# Patient Record
Sex: FEMALE | Race: WHITE | Employment: FULL TIME | URBAN - METROPOLITAN AREA
[De-identification: names, ages, dates, MRNs, and addresses within clinical notes are randomized per-mention and may not be internally consistent; named-entity substitution may affect disease eponyms.]

---

## 2017-10-30 PROBLEM — E87.20 LACTIC ACIDOSIS: Status: ACTIVE | Noted: 2017-10-30

## 2017-10-30 PROBLEM — R56.9 SEIZURE (HCC): Status: ACTIVE | Noted: 2017-10-30

## 2017-10-30 PROBLEM — G93.1 ANOXIC ENCEPHALOPATHY (HCC): Status: ACTIVE | Noted: 2017-10-30

## 2017-10-30 PROBLEM — I46.9 CARDIAC ARREST (HCC): Status: ACTIVE | Noted: 2017-10-30

## 2017-10-30 PROBLEM — N39.0 UTI (URINARY TRACT INFECTION): Status: ACTIVE | Noted: 2017-10-30

## 2017-10-31 PROBLEM — J96.90 RESPIRATORY FAILURE (HCC): Status: ACTIVE | Noted: 2017-10-31

## 2017-10-31 PROBLEM — J69.0 ASPIRATION PNEUMONITIS (HCC): Status: ACTIVE | Noted: 2017-10-31

## 2017-11-02 PROBLEM — R94.31 LONG QT INTERVAL: Status: ACTIVE | Noted: 2017-11-02

## 2017-11-06 PROBLEM — J81.0 ACUTE PULMONARY EDEMA (HCC): Status: ACTIVE | Noted: 2017-11-06

## 2017-11-13 ENCOUNTER — TELEPHONE (OUTPATIENT)
Dept: NEUROLOGY | Age: 52
End: 2017-11-13

## 2017-11-13 NOTE — TELEPHONE ENCOUNTER
Patient is asking for a refill on Gabapentin 800 mg take 1 tablet by mouth three times daily. Patient last seen 9/1/17. [facility] Maintenance   Topic Date Due    Diabetes screen  09/14/2015    Flu vaccine (1) 09/01/2017    Pneumococcal med risk (1 of 1 - PPSV23) 09/01/2018 (Originally 9/14/1994)    Lipid screen  03/09/2020    DTaP/Tdap/Td vaccine (2 - Td) 07/31/2026    HIV screen  Completed             (applicable per patient's age: Cancer Screenings, Depression Screening, Fall Risk Screening, Immunizations)    LDL Cholesterol (mg/dL)   Date Value   03/09/2015 50     AST (U/L)   Date Value   09/01/2017 105 (H)     ALT (U/L)   Date Value   09/01/2017 179 (H)     BUN (mg/dL)   Date Value   09/01/2017 13      (goal A1C is < 7)   (goal LDL is <100) need 30-50% reduction from baseline     BP Readings from Last 3 Encounters:   09/01/17 136/88   05/02/17 115/74   03/30/17 140/80    (goal /80)      All Future Testing planned in [facility]:  Lab Frequency Next Occurrence   MRI Wrist Right W WO Contrast Once 12/26/2017   Hepatitis B E Antibody Once 04/10/2018   Hepatitis B DNA, Ultraquantitative, PCR Once 04/10/2018   Hepatitis B E Antigen Once 04/10/2018       Next Visit Date:  No future appointments.          Patient Active Problem List:     HTN (hypertension)     Arthritis     Gout Pt phoned wanting to make an appointment with Dr Afua Manrique but could not tell us why. She has an appointment with pcp and she will check and see if Dr Imani Hanson needs to see her.   Called pt

## 2017-11-15 ENCOUNTER — OFFICE VISIT (OUTPATIENT)
Dept: FAMILY MEDICINE CLINIC | Age: 52
End: 2017-11-15

## 2017-11-15 VITALS
DIASTOLIC BLOOD PRESSURE: 72 MMHG | HEART RATE: 84 BPM | RESPIRATION RATE: 16 BRPM | HEIGHT: 63 IN | WEIGHT: 110.8 LBS | BODY MASS INDEX: 19.63 KG/M2 | TEMPERATURE: 98.5 F | OXYGEN SATURATION: 98 % | SYSTOLIC BLOOD PRESSURE: 106 MMHG

## 2017-11-15 DIAGNOSIS — Z12.4 CERVICAL CANCER SCREENING: ICD-10-CM

## 2017-11-15 DIAGNOSIS — Z28.21 PNEUMOCOCCAL VACCINATION DECLINED BY PATIENT: ICD-10-CM

## 2017-11-15 DIAGNOSIS — Z12.39 BREAST CANCER SCREENING: ICD-10-CM

## 2017-11-15 DIAGNOSIS — I46.9 CARDIAC ARREST (HCC): ICD-10-CM

## 2017-11-15 DIAGNOSIS — Z13.220 LIPID SCREENING: ICD-10-CM

## 2017-11-15 DIAGNOSIS — F10.10 ETOH ABUSE: ICD-10-CM

## 2017-11-15 DIAGNOSIS — I10 ESSENTIAL HYPERTENSION: Primary | ICD-10-CM

## 2017-11-15 DIAGNOSIS — R56.9 SEIZURE (HCC): ICD-10-CM

## 2017-11-15 DIAGNOSIS — I47.29 POLYMORPHIC VENTRICULAR TACHYCARDIA: ICD-10-CM

## 2017-11-15 DIAGNOSIS — F17.200 TOBACCO USE DISORDER: ICD-10-CM

## 2017-11-15 DIAGNOSIS — J96.01 ACUTE RESPIRATORY FAILURE WITH HYPOXIA (HCC): ICD-10-CM

## 2017-11-15 DIAGNOSIS — E87.6 HYPOKALEMIA: ICD-10-CM

## 2017-11-15 DIAGNOSIS — Z12.11 COLON CANCER SCREENING: ICD-10-CM

## 2017-11-15 DIAGNOSIS — Z28.21 INFLUENZA VACCINATION DECLINED BY PATIENT: ICD-10-CM

## 2017-11-15 PROCEDURE — 99204 OFFICE O/P NEW MOD 45 MIN: CPT | Performed by: FAMILY MEDICINE

## 2017-11-15 ASSESSMENT — ENCOUNTER SYMPTOMS
COUGH: 0
CONSTIPATION: 0
PHOTOPHOBIA: 0
STRIDOR: 0
EYE ITCHING: 0
EYE PAIN: 0
SINUS PRESSURE: 0
EYE DISCHARGE: 0
NAUSEA: 0
SHORTNESS OF BREATH: 0
COLOR CHANGE: 0
RHINORRHEA: 0
WHEEZING: 0
ABDOMINAL PAIN: 0
RECTAL PAIN: 0
VOMITING: 0
FACIAL SWELLING: 0
TROUBLE SWALLOWING: 0
CHOKING: 0
ANAL BLEEDING: 0
BLOOD IN STOOL: 0
VOICE CHANGE: 0
APNEA: 0
EYE REDNESS: 0
ABDOMINAL DISTENTION: 0
SORE THROAT: 0
BACK PAIN: 0
CHEST TIGHTNESS: 0
DIARRHEA: 0

## 2017-11-15 NOTE — PROGRESS NOTES
Subjective:      Patient ID: Joanna Domingo is a 46 y.o. female. HPI  Establish as new patient:relocating pcp due to convenience. Procedure Date  Date: 11/09/2017 Start: 11:34 AM    Study Location: Portable  Technical Quality: Limited visualization    Additional Indications:Lv fx. Patient Status: Routine    Height: 63 inches Weight: 129 pounds BSA: 1.61 m2 BMI: 22.85 kg/m2    Rhythm: Within normal limits BP: 142/81 mmHg     Conclusions      Summary   Technically limited examination to evaluate EF.   Overall left ventricular function is normal. Estimated EF 60-70%.   No evidence of any pericardial effusion.      Signature      ------------------------------------------------------------------   Electronically signed by Pedro Calderon MD, [facility] (Interpreting   [id number]) on 11/09/2017 at 03:28 PM  New problem:  Presenting for ER/Hosp f/u:F ER & hospital records reviewed today. ER visit date:10/31/2017 - 11/11/2017 (11 days)  [address]  Signs/symptoms resulting in visit:cardiac arrest/seizure  Treatment/tests done during visit/hospital stay:intubated/labs/CT head/cards consult/surgery concult. Discharge date:11/11/17. Discharge dx/follow up:PCP/neurology/cards(appt is 11/17). /nephro appt(K+ related:appt is 11/16)  Cards assessment:PMVT with long QT in the presence of hypokalemia. Now:feels well. No seizure recurrence. Etoh abuse:last drink approx 3weeks ago;no plans to drink again. HTN:Taking bp meds w/o side effects. Following low salt diet. Etoh consumption:none now. Adds salt to food at the table:No.  No other associated or improving factors. Denies cp/sob/pnd/ankle edema/dizziness.      Allergies   Allergen Reactions    Nuts [Peanut-Containing Drug Products] Anaphylaxis and Other (See Comments)     Stops breathing      Shellfish-Derived Products Anaphylaxis and Other (See Comments)     Stops breathing      Penicillins Hives    Azithromycin Nausea And Vomiting    exhibits no distension, no abdominal bruit and no mass. There is no splenomegaly or hepatomegaly. There is no tenderness. Musculoskeletal:        Right shoulder: Normal.        Left shoulder: Normal.        Right elbow: Normal.       Left elbow: Normal.        Right wrist: Normal.        Left wrist: Normal.        Right hip: Normal.        Left hip: Normal.        Right knee: Normal.        Left knee: Normal.        Right ankle: Normal.        Left ankle: Normal.        Cervical back: Normal.        Thoracic back: Normal.        Lumbar back: Normal.        Right upper arm: Normal.        Left upper arm: Normal.        Right forearm: Normal.        Left forearm: Normal.        Right hand: Normal.        Left hand: Normal.        Right upper leg: Normal.        Left upper leg: Normal.        Right lower leg: Normal.        Left lower leg: Normal.        Right foot: Normal.        Left foot: Normal.   Lymphadenopathy:        Head (right side): No submental, no submandibular, no tonsillar, no preauricular, no posterior auricular and no occipital adenopathy present. Head (left side): No submental, no submandibular, no tonsillar, no preauricular, no posterior auricular and no occipital adenopathy present. She has no cervical adenopathy. No supraclavicular LAD   Neurological: She is alert and oriented to person, place, and time. She has normal strength and normal reflexes. No cranial nerve deficit or sensory deficit. Nml gait. Able to stand w/o difficulty   Skin: Skin is warm, dry and intact. No abrasion and no rash noted. She is not diaphoretic. No cyanosis. Nails show no clubbing. Capillary refill=2-3secs   Psychiatric: She has a normal mood and affect. Her speech is normal and behavior is normal. Judgment and thought content normal. Cognition and memory are normal.   Good eye contact. Assessment:      1. Essential hypertension  VSS/well appearing. Stable. Continue same medication plan.   Low salt diet advised. Comprehensive Metabolic Panel   2. Seizure ([address]  Resolved. Keep neurology appt for 2nd opinion. No driving until clearance by neurology. Terryl Neighbor (Consult Only)   3. PMVT  Stable. Keep cards f/u appt. 4. Acute respiratory failure with hypoxia (HCC)  Resolved. 5. ETOH abuse  In remission:congratulated. 6. Cardiac arrest [facility])  S/p resus:Resolved. 7. Tobacco use disorder  Counseled. Strongly encouraged to quit. Continue nicotine patches. 8. Hypokalemia  Resolved. Check recent lab. Comprehensive Metabolic Panel   9. Breast cancer screening  HUI DIGITAL SCREEN W OR WO CAD BILATERAL   10. Colon cancer screening  No prior colonoscopy. Ambulatory referral to Gastroenterology   11. Lipid screening  Comprehensive Metabolic Panel    Lipid Panel   12. Cervical cancer screening  Pt' to establish with gyn for routine pap smears/exams. 13. Influenza vaccination declined by patient     14. Pneumococcal vaccination declined by patient             Plan:      Advised release of medical records from all prior physicians(including PCP/specialists)  Obtain labs/diagnostic tests as discussed today & call back for results within 2-7days. Pt' left office in good condition. Advised to go to local ER or call 911 for any worrisome signs/sx.

## 2017-11-17 ENCOUNTER — OFFICE VISIT (OUTPATIENT)
Dept: CARDIOLOGY CLINIC | Age: 52
End: 2017-11-17

## 2017-11-17 VITALS
HEART RATE: 74 BPM | WEIGHT: 110 LBS | DIASTOLIC BLOOD PRESSURE: 54 MMHG | SYSTOLIC BLOOD PRESSURE: 92 MMHG | BODY MASS INDEX: 19.49 KG/M2

## 2017-11-17 DIAGNOSIS — E87.6 HYPOKALEMIA: ICD-10-CM

## 2017-11-17 DIAGNOSIS — R94.31 LONG QT INTERVAL: ICD-10-CM

## 2017-11-17 DIAGNOSIS — I10 ESSENTIAL HYPERTENSION: ICD-10-CM

## 2017-11-17 DIAGNOSIS — I47.29 POLYMORPHIC VENTRICULAR TACHYCARDIA: Primary | ICD-10-CM

## 2017-11-17 PROCEDURE — 99214 OFFICE O/P EST MOD 30 MIN: CPT | Performed by: NURSE PRACTITIONER

## 2017-11-17 PROCEDURE — 93000 ELECTROCARDIOGRAM COMPLETE: CPT | Performed by: NURSE PRACTITIONER

## 2017-11-17 NOTE — PROGRESS NOTES
[address]   Electrophysiology   Date: 11/17/2017     CC: hospital follow up  HPI: I had the privilege of seeing Avel Hernandez in follow up for VT, nonischemic CM, HTN and ETOH abuse. She was admitted in October 2017 s/p cardiac arrest in the setting of severe hypokalemia, K+ 1.9. She received several liters of fluid in resuscitation. Hypoxic the morning of 11/3 requiring intubation. LHC revealed non-obstructive CAD, EF 25% with LVEDP 30-35. She was diuresed with milrinone support. Toprol XL was started. Limited Echo 11/9 showed recovered EF 60%. Hypokalemia remain an ongoing problem. Nephrology held lisinopril and Spironolactone for hyperaldosterone work up. QT was prolonged at admission, resolved with potassium replacement. Interval history:  She is recovering from hospitalization. Energy level is slowly improving. Denies palpitations, chest pain, or shortness of breath. She saw a new PCP 2 days ago. She has not had a drink since discharge. Committed to abstaining. Continues to smoke, planing to stop. Recent testing and relevant Cardiac history:  ECG: sinus rhythm with inverted T waves, unchanged from previous EKG 11/11/17, SLv=699 msec    Limited echo 11/9/2017:  Summary   Technically limited examination to evaluate EF. Overall left ventricular function is normal. Estimated EF 60-70%. No evidence of any pericardial effusion. Limited Echo 11/3/2017:  Summary   -Left ventricular function is reduced with ejection fraction estimated at   30-35 %. Echo 10/31/2017:  Summary  Normal left ventricle size, wall thickness and systolic function. Technically difficult study. Severe global hypokinesis with EF of less than 20%  Mild mitral regurgitation is present. Trivial tricuspid regurgitation.      [facility] 11/3/2017:  Cath with 50% mid LAD,50% OM1  EF 25% with LVEDP 30-35      Past Medical History:   Diagnosis Date    Abdominal pain     Cardiac arrest [facility]) 10/31/2017    [facility]ospitalized [facility]    Eczema     Epigastric pain     Gastric ulcer     Hypertension     Tobacco use disorder         Past Surgical History:   Procedure Laterality Date    CHOLECYSTECTOMY, LAPAROSCOPIC  10/10/2016    LAPAROSCOPIC CHOLECYSTECTOMY WITH CHOLANGIOGRAM    KNEE SURGERY      left torn meniscus repair    MANDIBLE SURGERY      SHOULDER SURGERY      SINUS SURGERY      TONSILLECTOMY      TUBAL LIGATION      UPPER GASTROINTESTINAL ENDOSCOPY  07/21/2016    WISDOM TOOTH EXTRACTION         Allergies: Allergies   Allergen Reactions    Nuts [Peanut-Containing Drug Products] Anaphylaxis and Other (See Comments)     Stops breathing      Shellfish-Derived Products Anaphylaxis and Other (See Comments)     Stops breathing      Penicillins Hives    Azithromycin Nausea And Vomiting    Erythromycin Nausea And Vomiting     GI upset       Social History:  Reviewed. reports that she has been smoking Cigarettes. She has a 20.00 pack-year smoking history. She has never used smokeless tobacco. She reports that she does not drink alcohol or use drugs. Family History:  Reviewed. family history includes Alcohol Abuse in her father; Allergy (Severe) in her mother; Anemia in her sister; Arthritis in her mother; Asthma in her brother and sister; Cancer in her sister; Coronary Art Dis in her father and mother; Early Death in her sister; Hearing Loss in her sister; Heart Disease in her father and mother; High Blood Pressure in her mother; High Cholesterol in her mother; Learning Disabilities in her father; Mental Illness in her mother and sister; Obesity in her mother and sister; Osteoporosis in her sister.      Review of System:    · General ROS: negative for - chills, fever   · Psychological ROS: negative for - anxiety or depression  · Ophthalmic ROS: negative for - eye pain or loss of vision  · ENT ROS: negative for - headaches, sore throat   · Allergy and Immunology ROS: negative for - hives  · Hematological and CQ) 21 MG/24HR Place 1 patch onto the skin daily 14 patch 0     No current facility-administered medications for this visit. 1. PMVT    2. Long QT interval    3. Essential hypertension    4. Severe hypokalemia         Assessment and plan:   -Polymorphic VT   -in the setting of severe hypokalemia, 1.9   -no further episodes during admission   -EF recovered to 60%   -QT interval corrected   -continue Metoprolol    -HTN   -controlled    -Hyperkalemia   -K+ 1.9 on admission   -work up for hyperaldosteronism per Nephrology   -PCP ordered repeat CMP    -keep follow up with Nephrology     Thank you for allowing me to participate in the care of Christoph Haines. Further evaluation will be based upon the patient's clinical course and testing results. All questions and concerns were addressed to the patient/family. Alternatives to my treatment were discussed. I have discussed the above stated plan and the patient verbalized understanding and agreed with the plan.     Mathew Hamm, [address]

## 2017-11-20 DIAGNOSIS — I10 ESSENTIAL HYPERTENSION: ICD-10-CM

## 2017-11-20 DIAGNOSIS — E87.6 HYPOKALEMIA: Primary | ICD-10-CM

## 2017-11-20 DIAGNOSIS — Z13.220 LIPID SCREENING: ICD-10-CM

## 2017-11-20 DIAGNOSIS — E87.6 HYPOKALEMIA: ICD-10-CM

## 2017-11-20 LAB
A/G RATIO: 1.3 (ref 1.1–2.2)
ALBUMIN SERPL-MCNC: 4.1 G/DL (ref 3.4–5)
ALP BLD-CCNC: 159 U/L (ref 40–129)
ALT SERPL-CCNC: 33 U/L (ref 10–40)
ANION GAP SERPL CALCULATED.3IONS-SCNC: 19 MMOL/L (ref 3–16)
AST SERPL-CCNC: 28 U/L (ref 15–37)
BILIRUB SERPL-MCNC: 0.5 MG/DL (ref 0–1)
BUN BLDV-MCNC: 8 MG/DL (ref 7–20)
CALCIUM SERPL-MCNC: 10 MG/DL (ref 8.3–10.6)
CHLORIDE BLD-SCNC: 94 MMOL/L (ref 99–110)
CHOLESTEROL, TOTAL: 260 MG/DL (ref 0–199)
CO2: 27 MMOL/L (ref 21–32)
CREAT SERPL-MCNC: 0.5 MG/DL (ref 0.6–1.1)
GFR AFRICAN AMERICAN: >60
GFR NON-AFRICAN AMERICAN: >60
GLOBULIN: 3.2 G/DL
GLUCOSE BLD-MCNC: 81 MG/DL (ref 70–99)
HDLC SERPL-MCNC: 48 MG/DL (ref 40–60)
LDL CHOLESTEROL CALCULATED: 188 MG/DL
POTASSIUM SERPL-SCNC: 3.2 MMOL/L (ref 3.5–5.1)
SODIUM BLD-SCNC: 140 MMOL/L (ref 136–145)
TOTAL PROTEIN: 7.3 G/DL (ref 6.4–8.2)
TRIGL SERPL-MCNC: 120 MG/DL (ref 0–150)
VLDLC SERPL CALC-MCNC: 24 MG/DL

## 2017-11-21 DIAGNOSIS — E87.6 HYPOKALEMIA: Primary | ICD-10-CM

## 2017-11-21 RX ORDER — POTASSIUM CHLORIDE 750 MG/1
10 TABLET, EXTENDED RELEASE ORAL 2 TIMES DAILY
Qty: 2 TABLET | Refills: 0 | Status: SHIPPED | OUTPATIENT
Start: 2017-11-21 | End: 2017-12-15 | Stop reason: ALTCHOICE

## 2017-12-15 ENCOUNTER — OFFICE VISIT (OUTPATIENT)
Dept: FAMILY MEDICINE CLINIC | Age: 52
End: 2017-12-15

## 2017-12-15 VITALS
HEART RATE: 74 BPM | RESPIRATION RATE: 16 BRPM | SYSTOLIC BLOOD PRESSURE: 114 MMHG | WEIGHT: 108.3 LBS | TEMPERATURE: 97.6 F | HEIGHT: 63 IN | BODY MASS INDEX: 19.19 KG/M2 | DIASTOLIC BLOOD PRESSURE: 68 MMHG | OXYGEN SATURATION: 98 %

## 2017-12-15 DIAGNOSIS — R94.31 LONG QT INTERVAL: ICD-10-CM

## 2017-12-15 DIAGNOSIS — R56.9 SEIZURE (HCC): ICD-10-CM

## 2017-12-15 DIAGNOSIS — I10 ESSENTIAL HYPERTENSION: ICD-10-CM

## 2017-12-15 DIAGNOSIS — F10.10 ETOH ABUSE: ICD-10-CM

## 2017-12-15 DIAGNOSIS — E78.2 MIXED HYPERLIPIDEMIA: ICD-10-CM

## 2017-12-15 DIAGNOSIS — R74.8 ELEVATED ALKALINE PHOSPHATASE LEVEL: Primary | ICD-10-CM

## 2017-12-15 DIAGNOSIS — I47.29 POLYMORPHIC VENTRICULAR TACHYCARDIA: ICD-10-CM

## 2017-12-15 DIAGNOSIS — E27.8 ADRENAL NODULE (HCC): ICD-10-CM

## 2017-12-15 DIAGNOSIS — E87.6 HYPOKALEMIA: ICD-10-CM

## 2017-12-15 DIAGNOSIS — F17.200 TOBACCO USE DISORDER: ICD-10-CM

## 2017-12-15 PROCEDURE — 99214 OFFICE O/P EST MOD 30 MIN: CPT | Performed by: FAMILY MEDICINE

## 2017-12-15 ASSESSMENT — ENCOUNTER SYMPTOMS
ABDOMINAL DISTENTION: 0
VOMITING: 0
RECTAL PAIN: 0
ANAL BLEEDING: 0
CONSTIPATION: 0
STRIDOR: 0
NAUSEA: 0
BLOOD IN STOOL: 0
APNEA: 0
COUGH: 0
DIARRHEA: 0
CHOKING: 0
SHORTNESS OF BREATH: 0
WHEEZING: 0
ABDOMINAL PAIN: 0
CHEST TIGHTNESS: 0

## 2017-12-15 NOTE — PATIENT INSTRUCTIONS
Patient Education        High Cholesterol: Care Instructions  Your Care Instructions    Cholesterol is a type of fat in your blood. It is needed for many body functions, such as making new cells. Cholesterol is made by your body. It also comes from food you eat. High cholesterol means that you have too much of the fat in your blood. This raises your risk of a heart attack and stroke. LDL and HDL are part of your total cholesterol. LDL is the \"bad\" cholesterol. High LDL can raise your risk for heart disease, heart attack, and stroke. HDL is the \"good\" cholesterol. It helps clear bad cholesterol from the body. High HDL is linked with a lower risk of heart disease, heart attack, and stroke. Your cholesterol levels help your doctor find out your risk for having a heart attack or stroke. You and your doctor can talk about whether you need to lower your risk and what treatment is best for you. A heart-healthy lifestyle along with medicines can help lower your cholesterol and your risk. The way you choose to lower your risk will depend on how high your risk is for heart attack and stroke. It will also depend on how you feel about taking medicines. Follow-up care is a key part of your treatment and safety. Be sure to make and go to all appointments, and call your doctor if you are having problems. It's also a good idea to know your test results and keep a list of the medicines you take. How can you care for yourself at home? · Eat a variety of foods every day. Good choices include fruits, vegetables, whole grains (like oatmeal), dried beans and peas, nuts and seeds, soy products (like tofu), and fat-free or low-fat dairy products. · Replace butter, margarine, and hydrogenated or partially hydrogenated oils with olive and canola oils. (Canola oil margarine without trans fat is fine.)  · Replace red meat with fish, poultry, and soy protein (like tofu).   · Limit processed and packaged foods like chips, crackers, and cookies. · Bake, broil, or steam foods. Don't dunn them. · Be physically active. Get at least 30 minutes of exercise on most days of the week. Walking is a good choice. You also may want to do other activities, such as running, swimming, cycling, or playing tennis or team sports. · Stay at a healthy weight or lose weight by making the changes in eating and physical activity listed above. Losing just a small amount of weight, even 5 to 10 pounds, can reduce your risk for having a heart attack or stroke. · Do not smoke. When should you call for help? Watch closely for changes in your health, and be sure to contact your doctor if:  ? · You need help making lifestyle changes. ? · You have questions about your medicine. Where can you learn more? Go to https://Helpful Technologiespepiceweb.Last Second Tickets. org and sign in to your Capt[employer] account. Enter Q577 in the Blast Ramp box to learn more about \"High Cholesterol: Care Instructions. \"     If you do not have an account, please click on the \"Sign Up Now\" link. Current as of: September 21, 2016  Content Version: 11.4  © 7611-7262 [employer]. Care instructions adapted under license by [facility] ([facility]). If you have questions about a medical condition or this instruction, always ask your healthcare professional. [address] any warranty or liability for your use of this information. Patient Education        Bowel Blockage (Intestinal Obstruction): Care Instructions  Your Care Instructions  A bowel blockage, also called an intestinal obstruction, can prevent gas, fluids, or solids from moving through the intestines normally. It can cause constipation and, rarely, diarrhea. You may have pain, nausea, vomiting, and cramping. Most of the time, complete blockages require a stay in the hospital and possibly surgery.  But if your bowel is only partly blocked, your doctor may tell you to wait until it clears on its own and you are able to may make it hard for your body to take in vitamins and minerals. · Get regular exercise. It helps you digest your food better. Get at least 30 minutes of physical activity on most days of the week. Walking is a good choice. When should you call for help? Call your doctor now or seek immediate medical care if:  ? · You have a fever. ? · You are vomiting. ? · You have new or worse belly pain. ? · You cannot pass stools or gas. ? Watch closely for changes in your health, and be sure to contact your doctor if you have any problems. Where can you learn more? Go to https://Kodablepepiceweb.Curverider. org and sign in to your Blue[employer] Securityâ„¢ account. Enter C878 in the B[employer] box to learn more about \"Bowel Blockage (Intestinal Obstruction): Care Instructions. \"     If you do not have an account, please click on the \"Sign Up Now\" link. Current as of: May 12, 2017  Content Version: 11.4  © 5012-7810 Healthwise[employer] Incorporated. Care instructions adapted under license by [facility] ([facility]). If you have questions about a medical condition or this instruction, always ask your healthcare professional. [address] any warranty or liability for your use of this information.

## 2017-12-15 NOTE — PROGRESS NOTES
Unauthorized disclosure or use of this information is prohibited by law. If you are not the intended recipient of this document, please advise us    by calling immediately [phone]. Impression/Conclusion below       450   75   CT SCAN OF THE ABDOMEN AND PELVIS WITH CONTRAST DATED NOVEMBER 27, 2017       HISTORY:   Hypokalemia       COMPARISON:  None       TECHNIQUE: Enhanced multiplanar CT images of the abdomen and pelvis       Contrast: 75 mL Optiray 350, 450 mL barium sulfate oral suspension       DOSE REDUCTION MEASURE: All CT scans performed at [address] [place] [address] use dose optimization    techniques as appropriate to the performed exam.           FINDINGS:       LUNG BASES:  Right basilar opacity is present, differential considerations to include bacterial    or aspiration pneumonia.  Atelectatic change seen within left lung base. LIVER:  Unremarkable   GALLBLADDER/BILE DUCTS:  The gallbladder surgically absent. PANCREAS:  Unremarkable.  No mass or duct dilation. SPLEEN:  Unremarkable   ADRENALS:  Bilateral adrenal nodules are present, measuring 1.2 cm on the left, and 1.5 cm on    the right.  Given the clinical history, differential considerations would include a functioning    adrenal adenoma. KIDNEYS/URETERS:  Unremarkable.  No hydronephrosis, stone, or suspicious mass. GI TRACT:  Scattered colonic diverticula are present, without evidence of diverticulitis.  The    appendix is normal.   PELVIS:  Unremarkable   VESSELS:  Unremarkable.  No aneurysm. LYMPH NODES: Unremarkable.  No enlarged lymph nodes. ABD WALL:  9 mm soft tissue nodule seen within the subcutaneous fat, lower right anterior    abdominal wall.  This may represent a small node or sebaceous cyst.   BONES:  A bone island is seen within the posterior left acetabular region. OTHER:  None           IMPRESSION:       1.  Bilateral adrenal nodules, and given the clinical history, one may represent a functioning    adenoma.    2. Nose: Nose normal.   Mouth/Throat: Uvula is midline, oropharynx is clear and moist and mucous membranes are normal.   Hearing intact to nml conversation   Eyes: Conjunctivae, EOM and lids are normal. Pupils are equal, round, and reactive to light. Neck: Trachea normal and normal range of motion. Neck supple. No JVD present. Carotid bruit is not present. Cardiovascular: Normal rate, regular rhythm, normal heart sounds, intact distal pulses and normal pulses. No murmur heard. No ankle edema. Pulmonary/Chest: Effort normal and breath sounds normal.   CTAB,good AE bilaterally   Abdominal: Soft. Normal appearance and bowel sounds are normal. She exhibits no distension and no mass. There is no splenomegaly or hepatomegaly. There is no tenderness. There is no rigidity, no guarding, no tenderness at McBurney's point and negative Goldsmith's sign. Lymphadenopathy:     She has no cervical adenopathy. Neurological: She is alert and oriented to person, place, and time. Skin: Skin is warm, dry and intact. No rash noted. She is not diaphoretic. No cyanosis. No pallor. Good skin turgor. Capillary refill=2-3 secs. Psychiatric: She has a normal mood and affect. Good eye contact. Assessment:      1. Elevated alkaline phosphatase level  VSS/well appearing. [address] lab. Has quit etoh use approx 8weeks with no plans to drink etoh again. Alkaline Phosphatase   2. Essential hypertension  Stable. Comprehensive Metabolic Panel   3. Mixed hyperlipidemia  New dx. The patient is asked to make an attempt to improve diet and exercise patterns to aid in medical management of this problem. Clinical handouts to address lipid panel given today(familydoctor. org/healthy advice leaflet). Labs in 2-3mos. Comprehensive Metabolic Panel    Lipid Panel   4. Long QT interval  Resolved. Per cards monitoring. 5. Hypokalemia  S/p repletion & takes daily med. Check lab today. Rosalinda Spain MD   6.  PMVT

## 2017-12-20 ENCOUNTER — OFFICE VISIT (OUTPATIENT)
Dept: NEUROLOGY | Age: 52
End: 2017-12-20

## 2017-12-20 VITALS
HEIGHT: 63 IN | SYSTOLIC BLOOD PRESSURE: 122 MMHG | BODY MASS INDEX: 18.96 KG/M2 | WEIGHT: 107 LBS | DIASTOLIC BLOOD PRESSURE: 84 MMHG | HEART RATE: 79 BPM

## 2017-12-20 DIAGNOSIS — G93.1 ANOXIC ENCEPHALOPATHY (HCC): ICD-10-CM

## 2017-12-20 DIAGNOSIS — R56.9 SEIZURE-LIKE ACTIVITY (HCC): Primary | ICD-10-CM

## 2017-12-20 PROCEDURE — 99244 OFF/OP CNSLTJ NEW/EST MOD 40: CPT | Performed by: PSYCHIATRY & NEUROLOGY

## 2017-12-20 NOTE — PATIENT INSTRUCTIONS
Please call with any questions or concerns:   [facility]  @ [phone]. LAB RESULTS:  Please obtain any labs or diagnostic tests as discussed today. You may call the office to check the results. Please allow  3 to 7 days for us to get these results. MEDICATION LIST:  Please bring an accurate list of your medications to every visit. APPOINTMENT CONFIRMATION:  We will call you the day before your scheduled appointment to confirm. If we are unable to reach you, you MUST call back by the end of the day to confirm the appointment or we may be forced to cancel.

## 2017-12-20 NOTE — PROGRESS NOTES
Occupational History    [employer]:customer services      Social History Main Topics    Smoking status: Current Every Day Smoker     Packs/day: 1.00     Years: 20.00     Types: Cigarettes     Last attempt to quit: 7/18/2016    Smokeless tobacco: Never Used    Alcohol use No      Comment: last drink 10/30/17:prior use was 2 shots whiskey nightly    Drug use: No    Sexual activity: Yes     Partners: Male      Comment: -Eric: No children      Other Topics Concern    None     Social History Narrative    None       PHYSICAL EXAMINATION:  /84   Pulse 79   Ht 5' 3\" (1.6 m)   Wt 107 lb (48.5 kg)   LMP 08/19/2013 (LMP Unknown)   BMI 18.95 kg/m²   Appearance: Well appearing, well nourished and in no distress  Mental Status Exam: Patient is alert, oriented to person, place and time. Recent and remote memory is normal  Fund of Knowledge is normal  Attention/concentration is normal.   Speech : No dysarthria  Language : No aphasia  Funduscopic Exam: sharp disc margins  Cranial Nerves:   II: Visual fields:  Full to confrontation  III: Pupils:  equal, round, reactive to light  III,IV,VI: Extra Ocular Movements are intact. No nystagmus  V: Facial sensation is intact to pin prick and light touch  VII: Facial strength and movements: intact and symmetric smile,cheek puffing and eyebrow elevation  VIII: Hearing:  Intact to finger rub bilaterally  IX: Palate  elevation is symmetric  XI: Shoulder shrug is intact  XII: Tongue movements are normal  Motor:  Muscle tone and bulk are normal.   Strength is symmetrical 5/5 in all four extremities. Sensory: Intact to light touch and  pin prick in all four extremities  Coordination:  Normal  Finger to Nose and Heel to Shin bilaterally    . Reflexes:  DTR +2 and symmetric bilaterally  Plantar response: Flexor bilaterally  Gait: Gait and station is normal. Patient can toe/ heel and tandem walk without difficulty  Romberg: negative  Vascular: No carotid bruit bilaterally        DATA:  LABS:  General Labs:    CBC:   Lab Results   Component Value Date    WBC 14.0 11/11/2017    RBC 4.77 11/11/2017    HGB 16.7 11/11/2017    HCT 50.1 11/11/2017    .2 11/11/2017    MCH 35.1 11/11/2017    MCHC 33.4 11/11/2017    RDW 16.5 11/11/2017     11/11/2017    MPV 8.4 11/11/2017     BMP:    Lab Results   Component Value Date     11/20/2017    K 3.2 11/20/2017    CL 94 11/20/2017    CO2 27 11/20/2017    BUN 8 11/20/2017    LABALBU 4.1 11/20/2017    CREATININE 0.5 11/20/2017    CALCIUM 10.0 11/20/2017    GFRAA >60 11/20/2017    LABGLOM >60 11/20/2017    GLUCOSE 81 11/20/2017     RADIOLOGY REVIEW:  I have reviewed radiology image(s) and reports(s) of:  CT scan of the head    IMPRESSION :  Seizure-like activity  This is probably due to transient anoxic encephalopathy and patient is now completely recovered  Nonfocal neurological examination  CT head was reviewed and did not show any acute changes  EEG was normal      RECOMMENDATIONS :  Discussed with patient  No need for any antiepileptic medication at this time  No need for any further neurological workup unless she develops any neurological symptoms  I will see her on a when necessary basis  Thank you for this consultation        Please note a portion of this chart was generated using dragon dictation software. Although every effort was made to ensure the accuracy of this automated transcription, some errors in transcription may have occurred.

## 2017-12-20 NOTE — LETTER
[facility]  [address] [address]  Phone: [phone]  Fax: [phone]    Fermin Boast, MD        December 20, 2017       Patient: Anabell Freedman   MR Number: [id number]   YOB: 1965   Date of Visit: 12/20/2017       Dear Dr. Shayne Lombardo:    Thank you for the request for consultation for Fatimah Flores to me for the evaluation of Transient anoxic encephalopathy and seizure-like activity. Below are the relevant portions of my assessment and plan of care. NEUROLOGY CONSULTATION     Chief Complaint   Patient presents with    Seizures     Patient is here today for a new onset seizure. Patient says  that back in October she went into cardiac arrest which caused her to have the seizure. HISTORY OF PRESENT ILLNESS :    Fatimah Flores is a 46 y.o. female who is referred by Dr. Shayne Lombardo   History was obtained from patient  Patient apparently was admitted to the hospital on October 30, 2017 after she suffered a cardiac arrest at home. When the patient's  initially found her she had passed out and then she had some stiffening and questionable jerking in the arms and he was concerned about a possible seizure when the paramedics arrived they found that she had cardiac arrest and patient was resuscitated and kept in the hospital.  She spent a few weeks in the hospital.  She was found to have severe hypokalemia. She was seen by multiple consultants including cardiology and pulmonary medicine as well as nephrology. Patient tells me that now they are suspecting adrenal gland problem and she is supposed to see an endocrinologist as well. Patient has been feeling tired after the spells. She has not had any further seizure-like activity or other syncope. She has generalized weakness but  denies any focal weakness vertigo or diplopia. Patient used to drink a couple of shots of liquor daily before October 30 but now she has stopped drinking alcohol totally. REVIEW OF SYSTEMS    Constitutional:     Chills     Fatigue     Fevers     Malaise     Weight loss      Denies all of the above    Eyes:    Double vision     Blurry vision      Denies all of the above    Ears, nose, mouth, throat, and face:    Hearing loss       Hoarseness        Snoring      Tinnitus        Denies all of the above     Respiratory:     Cough      Shortness of breath          Denies all of the above     Cardiovascular:     Chest pain      Exertional chest pressure/discomfort            Palpitations      Syncope      Denies all of the above    Gastrointestinal:     Abdominal pain     Constipation      Diarrhea       Dysphagia                       Denies all of the above    Genitourinary:        Frequency     Hematuria       Urinary incontinence            Denies all of the above     Hematologic/lymphatic:    Bleeding      Easy bruising     Anemia   Denies all of the above     Musculoskeletal:    Back pain         Myalgias      Neck pain            Denies all of the above    Neurological: As noted in HPI    Behavioral/Psych:     Anxiety      Depression       Mood swings      Denies all of the above     Endocrine:     Temperature intolerance      Fatigue       Denies all of the above     Allergic/Immunologic:    Hay fever     Denies all of the above     Past Medical History:   Diagnosis Date    Abdominal pain     Cardiac arrest ([facility] [address] 10/31/2017    [facility]ospitalized [facility]    Eczema     Epigastric pain     Gastric ulcer     Hypertension     Mixed hyperlipidemia 12/15/2017    Tobacco use disorder      Family History   Problem Relation Age of Onset    Heart Disease Mother     Allergy (Severe) Mother     Arthritis Mother     Coronary Art Dis Mother     High Cholesterol Mother     High Blood Pressure Mother     Mental Illness Mother     Obesity Mother

## 2017-12-22 DIAGNOSIS — R74.8 ELEVATED ALKALINE PHOSPHATASE LEVEL: ICD-10-CM

## 2017-12-22 LAB — ALP BLD-CCNC: 85 U/L (ref 40–129)

## 2017-12-26 ENCOUNTER — OFFICE VISIT (OUTPATIENT)
Dept: ENDOCRINOLOGY | Age: 52
End: 2017-12-26

## 2017-12-26 VITALS
SYSTOLIC BLOOD PRESSURE: 140 MMHG | DIASTOLIC BLOOD PRESSURE: 80 MMHG | HEART RATE: 92 BPM | BODY MASS INDEX: 19.14 KG/M2 | OXYGEN SATURATION: 96 % | HEIGHT: 63 IN | WEIGHT: 108 LBS

## 2017-12-26 DIAGNOSIS — E26.9 HYPERALDOSTERONISM (HCC): ICD-10-CM

## 2017-12-26 DIAGNOSIS — E87.6 HYPOKALEMIA: ICD-10-CM

## 2017-12-26 DIAGNOSIS — E27.8 ADRENAL NODULE (HCC): ICD-10-CM

## 2017-12-26 DIAGNOSIS — I15.2 HYPERTENSION DUE TO ENDOCRINE DISORDER: Primary | ICD-10-CM

## 2017-12-26 PROBLEM — E27.9 ADRENAL NODULE (HCC): Status: ACTIVE | Noted: 2017-12-26

## 2017-12-26 PROCEDURE — 99244 OFF/OP CNSLTJ NEW/EST MOD 40: CPT | Performed by: INTERNAL MEDICINE

## 2017-12-26 RX ORDER — DEXAMETHASONE 1 MG
TABLET ORAL
Qty: 2 TABLET | Refills: 1 | Status: SHIPPED | OUTPATIENT
Start: 2017-12-26 | End: 2018-02-07 | Stop reason: CLARIF

## 2017-12-26 ASSESSMENT — PATIENT HEALTH QUESTIONNAIRE - PHQ9
SUM OF ALL RESPONSES TO PHQ QUESTIONS 1-9: 0
1. LITTLE INTEREST OR PLEASURE IN DOING THINGS: 0
SUM OF ALL RESPONSES TO PHQ9 QUESTIONS 1 & 2: 0
2. FEELING DOWN, DEPRESSED OR HOPELESS: 0

## 2017-12-26 NOTE — PROGRESS NOTES
normal heart sounds. Pulmonary/Chest: Effort normal and breath sounds normal.   Abdominal: Soft. Bowel sounds are normal.   Musculoskeletal: Normal range of motion. Neurological: Patient is alert and oriented to person, place, and time. Patient has normal reflexes. Skin: Skin is warm and dry. Psychiatric: Patient has a normal mood and affect.  Patient behavior is normal.     Lab Review:      Orders Only on 12/22/2017   Component Date Value Ref Range Status    Sodium 12/22/2017 142  136 - 145 mmol/L Final    Potassium 12/22/2017 3.9  3.5 - 5.1 mmol/L Final    Chloride 12/22/2017 100  99 - 110 mmol/L Final    CO2 12/22/2017 28  21 - 32 mmol/L Final    Anion Gap 12/22/2017 14  3 - 16 Final    Glucose 12/22/2017 123* 70 - 99 mg/dL Final    BUN 12/22/2017 11  7 - 20 mg/dL Final    CREATININE 12/22/2017 0.6  0.6 - 1.1 mg/dL Final    GFR Non- 12/22/2017 >60  >60 Final    GFR  12/22/2017 >60  >60 Final    Calcium 12/22/2017 10.4  8.3 - 10.6 mg/dL Final    Phosphorus 12/22/2017 4.6  2.5 - 4.9 mg/dL Final    Alb 12/22/2017 4.4  3.4 - 5.0 g/dL Final   Orders Only on 12/22/2017   Component Date Value Ref Range Status    Alkaline Phosphatase 12/22/2017 85  40 - 129 U/L Final   Orders Only on 11/20/2017   Component Date Value Ref Range Status    Sodium 11/20/2017 140  136 - 145 mmol/L Final    Potassium 11/20/2017 3.2* 3.5 - 5.1 mmol/L Final    Chloride 11/20/2017 94* 99 - 110 mmol/L Final    CO2 11/20/2017 27  21 - 32 mmol/L Final    Anion Gap 11/20/2017 19* 3 - 16 Final    Glucose 11/20/2017 81  70 - 99 mg/dL Final    BUN 11/20/2017 8  7 - 20 mg/dL Final    CREATININE 11/20/2017 0.5* 0.6 - 1.1 mg/dL Final    GFR Non- 11/20/2017 >60  >60 Final    GFR  11/20/2017 >60  >60 Final    Calcium 11/20/2017 10.0  8.3 - 10.6 mg/dL Final    Total Protein 11/20/2017 7.3  6.4 - 8.2 g/dL Final    Alb 11/20/2017 4.1  3.4 - 5.0 g/dL Final    Albumin/Globulin Ratio 11/20/2017 1.3  1.1 - 2.2 Final    Total Bilirubin 11/20/2017 0.5  0.0 - 1.0 mg/dL Final    Alkaline Phosphatase 11/20/2017 159* 40 - 129 U/L Final    ALT 11/20/2017 33  10 - 40 U/L Final    AST 11/20/2017 28  15 - 37 U/L Final    Globulin 11/20/2017 3.2  g/dL Final    Cholesterol, Total 11/20/2017 260* 0 - 199 mg/dL Final    Triglycerides 11/20/2017 120  0 - 150 mg/dL Final    HDL 11/20/2017 48  40 - 60 mg/dL Final    LDL Calculated 11/20/2017 188* <100 mg/dL Final    VLDL CHOLESTEROL CALCULATED 11/20/2017 24  Not Established mg/dL Final   Admission on 10/30/2017, Discharged on 11/11/2017   No results displayed because visit has over 200 results. Assessment and Plan       Jack Hawk was seen today for consultation. Diagnoses and all orders for this visit:    Hypertension due to endocrine disorder  -     TSH without Reflex; Future  -     T4, Free; Future    Adrenal nodule (HCC)  -     Cortisol AM, Total; Future  -     dexamethasone (DECADRON) 1 MG tablet; One tablet a night before cortisol test between 11pm to midnight.  -     METANEPHRINES PLASMA FREE; Future  -     DHEA; Future  -     DHEA-Sulfate; Future  -     MISCELLANEOUS SENDOUT 1; Future    Hyperaldosteronism (HCC)    Hypokalemia  -     TSH without Reflex;  Future  -     T4, Free; Future          1: Bilateral adrenal nodules with HTN and hypotkalemia     Keep BP diary and bring every visit or send in if readings are high or if she is symptomatic     Renin and [place] ratio suggest positive screen for hyperaldosteronism     Check for serum metanephrines   DEX suppression test   Deoxycorticosterone, DHEAS, DHEA  Screening for thyroid testing     RTC in 6 weeks           Electronically signed by Omar White MD on 12/26/2017 at 10:09 AM

## 2017-12-26 NOTE — PATIENT INSTRUCTIONS
arm or leg cramps. ? · You have tingling or numbness. ? · You feel sick to your stomach, or you vomit. ? · You have belly cramps. ? · You feel bloated or constipated. ? · You have to urinate a lot. ? · You feel very thirsty most of the time. ? · You are dizzy or lightheaded, or you feel like you may faint. ? · You feel depressed, or you lose touch with reality. ? Watch closely for changes in your health, and be sure to contact your doctor if:  ? · You do not get better as expected. Where can you learn more? Go to [email or web address] and sign in to your [employer] account. Enter [id number] in the [employer] box to learn more about \"Hypokalemia: Care Instructions. \"     If you do not have an account, please click on the \"Sign Up Now\" link. Current as of: May 12, 2017  Content Version: 11.4  © 1735-0627 Healthwise, Incorporated. Care instructions adapted under license by [facility] [employer] [facility] ([facility]). If you have questions about a medical condition or this instruction, always ask your healthcare professional. [address] any warranty or liability for your use of this information.

## 2017-12-27 LAB
T4 FREE: 1.2 NG/DL (ref 0.9–1.8)
TSH SERPL DL<=0.05 MIU/L-ACNC: 3.16 UIU/ML (ref 0.27–4.2)

## 2017-12-29 LAB
DHEA: 1.75 NG/ML (ref 0.63–4.7)
DHEAS (DHEA SULFATE): 72.1 UG/DL (ref 26–200)

## 2017-12-30 LAB
METANEPH/PLASMA INTERP: NORMAL
METANEPHRINE FREE PLASMA: 0.1 NMOL/L (ref 0–0.49)
NORMETANEPHRINE FREE PLASMA: 0.75 NMOL/L (ref 0–0.89)

## 2018-01-02 DIAGNOSIS — E27.8 ADRENAL NODULE (HCC): ICD-10-CM

## 2018-01-02 LAB
CORTISOL - AM: 1.4 UG/DL (ref 4.3–22.4)
MISCELLANEOUS LAB TEST ORDER: NORMAL

## 2018-01-04 ENCOUNTER — TELEPHONE (OUTPATIENT)
Dept: ENDOCRINOLOGY | Age: 53
End: 2018-01-04

## 2018-01-30 ENCOUNTER — TELEPHONE (OUTPATIENT)
Dept: CARDIOLOGY CLINIC | Age: 53
End: 2018-01-30

## 2018-02-07 ENCOUNTER — OFFICE VISIT (OUTPATIENT)
Dept: FAMILY MEDICINE CLINIC | Age: 53
End: 2018-02-07

## 2018-02-07 VITALS
SYSTOLIC BLOOD PRESSURE: 104 MMHG | WEIGHT: 111 LBS | DIASTOLIC BLOOD PRESSURE: 78 MMHG | HEART RATE: 83 BPM | RESPIRATION RATE: 16 BRPM | OXYGEN SATURATION: 98 % | BODY MASS INDEX: 19.67 KG/M2 | HEIGHT: 63 IN | TEMPERATURE: 97.8 F

## 2018-02-07 DIAGNOSIS — F10.10 ETOH ABUSE: ICD-10-CM

## 2018-02-07 DIAGNOSIS — I10 ESSENTIAL HYPERTENSION: ICD-10-CM

## 2018-02-07 DIAGNOSIS — R25.2 BILATERAL LEG CRAMPS: ICD-10-CM

## 2018-02-07 DIAGNOSIS — E26.9 HYPERALDOSTERONISM (HCC): ICD-10-CM

## 2018-02-07 DIAGNOSIS — R56.9 SEIZURE (HCC): ICD-10-CM

## 2018-02-07 DIAGNOSIS — F17.200 TOBACCO USE DISORDER: ICD-10-CM

## 2018-02-07 DIAGNOSIS — Z13.0 SCREENING FOR DEFICIENCY ANEMIA: ICD-10-CM

## 2018-02-07 DIAGNOSIS — M79.604 ACHING LEG SYNDROME OF RIGHT LOWER EXTREMITY: Primary | ICD-10-CM

## 2018-02-07 DIAGNOSIS — E27.8 ADRENAL NODULE (HCC): ICD-10-CM

## 2018-02-07 DIAGNOSIS — E87.6 HYPOKALEMIA: ICD-10-CM

## 2018-02-07 DIAGNOSIS — R94.31 LONG QT INTERVAL: ICD-10-CM

## 2018-02-07 DIAGNOSIS — R74.8 ALKALINE PHOSPHATASE ELEVATION: ICD-10-CM

## 2018-02-07 DIAGNOSIS — M79.604 ACHING LEG SYNDROME OF RIGHT LOWER EXTREMITY: ICD-10-CM

## 2018-02-07 DIAGNOSIS — E78.2 MIXED HYPERLIPIDEMIA: ICD-10-CM

## 2018-02-07 LAB
ANION GAP SERPL CALCULATED.3IONS-SCNC: 14 MMOL/L (ref 3–16)
BUN BLDV-MCNC: 12 MG/DL (ref 7–20)
CALCIUM SERPL-MCNC: 9.7 MG/DL (ref 8.3–10.6)
CHLORIDE BLD-SCNC: 102 MMOL/L (ref 99–110)
CO2: 26 MMOL/L (ref 21–32)
CREAT SERPL-MCNC: 0.6 MG/DL (ref 0.6–1.1)
GFR AFRICAN AMERICAN: >60
GFR NON-AFRICAN AMERICAN: >60
GLUCOSE BLD-MCNC: 100 MG/DL (ref 70–99)
HCT VFR BLD CALC: 45.9 % (ref 36–48)
HEMOGLOBIN: 15.4 G/DL (ref 12–16)
MCH RBC QN AUTO: 33.1 PG (ref 26–34)
MCHC RBC AUTO-ENTMCNC: 33.6 G/DL (ref 31–36)
MCV RBC AUTO: 98.3 FL (ref 80–100)
PDW BLD-RTO: 14.6 % (ref 12.4–15.4)
PLATELET # BLD: 252 K/UL (ref 135–450)
PMV BLD AUTO: 8.9 FL (ref 5–10.5)
POTASSIUM SERPL-SCNC: 4.4 MMOL/L (ref 3.5–5.1)
RBC # BLD: 4.66 M/UL (ref 4–5.2)
SODIUM BLD-SCNC: 142 MMOL/L (ref 136–145)
WBC # BLD: 10.2 K/UL (ref 4–11)

## 2018-02-07 PROCEDURE — 99214 OFFICE O/P EST MOD 30 MIN: CPT | Performed by: FAMILY MEDICINE

## 2018-02-07 RX ORDER — METOPROLOL SUCCINATE 25 MG/1
25 TABLET, EXTENDED RELEASE ORAL DAILY
COMMUNITY
End: 2018-11-01 | Stop reason: ALTCHOICE

## 2018-02-07 RX ORDER — AMLODIPINE BESYLATE 10 MG/1
10 TABLET ORAL DAILY
COMMUNITY
End: 2018-11-01 | Stop reason: ALTCHOICE

## 2018-02-07 ASSESSMENT — ENCOUNTER SYMPTOMS
DIARRHEA: 0
VOMITING: 0
CHEST TIGHTNESS: 0
CONSTIPATION: 0
CHOKING: 0
ABDOMINAL PAIN: 0
RECTAL PAIN: 0
STRIDOR: 0
COUGH: 0
APNEA: 0
NAUSEA: 0
WHEEZING: 0
ABDOMINAL DISTENTION: 0
ANAL BLEEDING: 0
BLOOD IN STOOL: 0
SHORTNESS OF BREATH: 0

## 2018-02-07 NOTE — PROGRESS NOTES
identified. Denies tylenol/etoh abuse. Denies abdo pain/jaudice/urine or stool color changes/hepatitis exposure/i.v drug abuse. Hyperlipidemia:doing well. Has good diet regime. Associated w/nothing new. Improving factors:better diet regime. Worsening factors:nothing new. Denies adbo pain/myalgias. F/u from cards:seen 11/17/17. Doing well. Had PMVT with long QT in the presence of hypokalemia. No other new associated concerns. Etoh abuse:last drink approx 3weeks ago;no plans to drink again. Seizure:no recurrence. Neurology appt was 12/20/17:office note via EPIC reviewed today:no further neurology eval-med/follow up needed per office note. Allergies   Allergen Reactions    Nuts [Peanut-Containing Drug Products] Anaphylaxis and Other (See Comments)     Stops breathing      Shellfish-Derived Products Anaphylaxis and Other (See Comments)     Stops breathing      Penicillins Hives    Azithromycin Nausea And Vomiting    Erythromycin Nausea And Vomiting     GI upset       Current Outpatient Prescriptions on File Prior to Visit   Medication Sig Dispense Refill    Qd [id number] kcl   0    folic acid (FOLVITE) 1 MG tablet Take 1 tablet by mouth daily 30 tablet 3    metoprolol succinate (TOPROL XL) 25 MG extended release tablet Take 1 tablet by mouth daily 30 tablet 3    amLODIPine (NORVASC) 10 MG tablet Take 1 tablet by mouth daily 30 tablet 3    nicotine (NICODERM CQ) 21 MG/24HR Place 1 patch onto the skin daily 14 patch 0     No current facility-administered medications on file prior to visit.         Past Medical History:   Diagnosis Date    Abdominal pain     Cardiac arrest [facility]) 10/31/2017    [facility]    Eczema     Epigastric pain     Gastric ulcer     Hyperaldosteronism ([facility] [address] 12/26/2017    under care of endo    Hypertension     [facility]Dr. Veda Bardales    Mixed hyperlipidemia 12/15/2017    Tobacco use disorder              Social History Screening for deficiency anemia  CBC   13. Bilateral leg cramps  See note#1. VL Ankle Art Brachial Indices Extremity Bilateral           Plan:         Obtain labs/diagnostic tests as discussed today & call back for results within 2-7days. Advised to go to local ER or call 911 for any worrisome signs/sx. Pt' left office in good condition.

## 2018-02-07 NOTE — PATIENT INSTRUCTIONS
you take decongestants or anti-inflammatory medicine, such as ibuprofen. Some of these medicines can raise blood pressure. · Learn how to check your blood pressure at home. Lifestyle changes  · Stay at a healthy weight. This is especially important if you put on weight around the waist. Losing even 10 pounds can help you lower your blood pressure. · If your doctor recommends it, get more exercise. Walking is a good choice. Bit by bit, increase the amount you walk every day. Try for at least 30 minutes on most days of the week. You also may want to swim, bike, or do other activities. · Avoid or limit alcohol. Talk to your doctor about whether you can drink any alcohol. · Try to limit how much sodium you eat to less than 2,300 milligrams (mg) a day. Your doctor may ask you to try to eat less than 1,500 mg a day. · Eat plenty of fruits (such as bananas and oranges), vegetables, legumes, whole grains, and low-fat dairy products. · Lower the amount of saturated fat in your diet. Saturated fat is found in animal products such as milk, cheese, and meat. Limiting these foods may help you lose weight and also lower your risk for heart disease. · Do not smoke. Smoking increases your risk for heart attack and stroke. If you need help quitting, talk to your doctor about stop-smoking programs and medicines. These can increase your chances of quitting for good. When should you call for help? Call 911 anytime you think you may need emergency care. This may mean having symptoms that suggest that your blood pressure is causing a serious heart or blood vessel problem. Your blood pressure may be over 180/110. ? For example, call 911 if:  ? · You have symptoms of a heart attack. These may include:  ¨ Chest pain or pressure, or a strange feeling in the chest.  ¨ Sweating. ¨ Shortness of breath. ¨ Nausea or vomiting.   ¨ Pain, pressure, or a strange feeling in the back, neck, jaw, or upper belly or in one or both shoulders or arms.  ¨ Lightheadedness or sudden weakness. ¨ A fast or irregular heartbeat. ? · You have symptoms of a stroke. These may include:  ¨ Sudden numbness, tingling, weakness, or loss of movement in your face, arm, or leg, especially on only one side of your body. ¨ Sudden vision changes. ¨ Sudden trouble speaking. ¨ Sudden confusion or trouble understanding simple statements. ¨ Sudden problems with walking or balance. ¨ A sudden, severe headache that is different from past headaches. ? · You have severe back or belly pain. ?Do not wait until your blood pressure comes down on its own. Get help right away. ?Call your doctor now or seek immediate care if:  ? · Your blood pressure is much higher than normal (such as 180/110 or higher), but you don't have symptoms. ? · You think high blood pressure is causing symptoms, such as:  ¨ Severe headache. ¨ Blurry vision. ? Watch closely for changes in your health, and be sure to contact your doctor if:  ? · Your blood pressure measures 140/90 or higher at least 2 times. That means the top number is 140 or higher or the bottom number is 90 or higher, or both. ? · You think you may be having side effects from your blood pressure medicine. ? · Your blood pressure is usually normal, but it goes above normal at least 2 times. Where can you learn more? Go to [email or web address]://[email or web address] and sign in to your [employer]ern[employer] account. Enter I856 in the [employer] box to learn more about \"High Blood Pressure: Care Instructions. \"     If you do not have an account, please click on the \"Sign Up Now\" link. Current as of: Faiza 10, 2017  Content Version: 11.5  © 7057-4095 [employer]. Care instructions adapted under license by [facility] ([facility]).  If you have questions about a medical condition or this instruction, always ask your healthcare professional. [employer] any warranty or liability for your use of this

## 2018-02-12 ENCOUNTER — HOSPITAL ENCOUNTER (OUTPATIENT)
Dept: VASCULAR LAB | Age: 53
Discharge: HOME | End: 2018-02-12
Attending: FAMILY MEDICINE | Admitting: FAMILY MEDICINE

## 2018-02-12 DIAGNOSIS — F17.200 TOBACCO USE DISORDER: Primary | ICD-10-CM

## 2018-02-12 DIAGNOSIS — R68.89 ABNORMAL ANKLE BRACHIAL INDEX (ABI): Primary | ICD-10-CM

## 2018-02-12 DIAGNOSIS — R25.2 BILATERAL LEG CRAMPS: ICD-10-CM

## 2018-02-12 DIAGNOSIS — M79.604 ACHING LEG SYNDROME OF RIGHT LOWER EXTREMITY: ICD-10-CM

## 2018-02-12 DIAGNOSIS — I10 ESSENTIAL HYPERTENSION: ICD-10-CM

## 2018-02-13 ENCOUNTER — TELEPHONE (OUTPATIENT)
Dept: FAMILY MEDICINE CLINIC | Age: 53
End: 2018-02-13

## 2018-02-22 ENCOUNTER — OFFICE VISIT (OUTPATIENT)
Dept: FAMILY MEDICINE CLINIC | Age: 53
End: 2018-02-22

## 2018-02-22 VITALS
DIASTOLIC BLOOD PRESSURE: 68 MMHG | TEMPERATURE: 97.7 F | OXYGEN SATURATION: 98 % | HEIGHT: 63 IN | SYSTOLIC BLOOD PRESSURE: 107 MMHG | HEART RATE: 79 BPM | WEIGHT: 115 LBS | RESPIRATION RATE: 16 BRPM | BODY MASS INDEX: 20.38 KG/M2

## 2018-02-22 DIAGNOSIS — I10 ESSENTIAL HYPERTENSION: ICD-10-CM

## 2018-02-22 DIAGNOSIS — I73.9 PAD (PERIPHERAL ARTERY DISEASE) (HCC): ICD-10-CM

## 2018-02-22 DIAGNOSIS — R68.89 ABNORMAL ANKLE BRACHIAL INDEX: Primary | ICD-10-CM

## 2018-02-22 DIAGNOSIS — F10.10 ETOH ABUSE: ICD-10-CM

## 2018-02-22 DIAGNOSIS — E26.9 HYPERALDOSTERONISM (HCC): ICD-10-CM

## 2018-02-22 DIAGNOSIS — E87.6 HYPOKALEMIA: ICD-10-CM

## 2018-02-22 DIAGNOSIS — M79.604 ACHING LEG SYNDROME OF RIGHT LOWER EXTREMITY: ICD-10-CM

## 2018-02-22 DIAGNOSIS — R25.2 BILATERAL LEG CRAMPS: ICD-10-CM

## 2018-02-22 DIAGNOSIS — F17.200 TOBACCO USE DISORDER: ICD-10-CM

## 2018-02-22 DIAGNOSIS — E78.2 MIXED HYPERLIPIDEMIA: ICD-10-CM

## 2018-02-22 DIAGNOSIS — E27.8 ADRENAL NODULE (HCC): ICD-10-CM

## 2018-02-22 PROCEDURE — 99214 OFFICE O/P EST MOD 30 MIN: CPT | Performed by: FAMILY MEDICINE

## 2018-02-22 ASSESSMENT — ENCOUNTER SYMPTOMS
RECTAL PAIN: 0
WHEEZING: 0
SHORTNESS OF BREATH: 0
ABDOMINAL PAIN: 0
CONSTIPATION: 0
VOMITING: 0
COUGH: 0
NAUSEA: 0
APNEA: 0
CHOKING: 0
ANAL BLEEDING: 0
ABDOMINAL DISTENTION: 0
BLOOD IN STOOL: 0
CHEST TIGHTNESS: 0
STRIDOR: 0
COLOR CHANGE: 0
DIARRHEA: 0

## 2018-02-22 NOTE — PROGRESS NOTES
patient's risk factor(s) include: dyslipidemia and arterial       hypertension.         - The patient has a current/recent (within 1 year) tobacco history.       Velocities are measured in cm/s ; Diameters are measured in mm       Pressures   +---------++--------+-----+----+--------+-----+   !         ! ! Right   !     !Left!        !     !   +---------++--------+-----+----+--------+-----+   ! Location ! !Pressure! Ratio!    !Pressure! Ratio! +---------++--------+-----+----+--------+-----+   ! DP       !!119     !0.82 !    !136     !0.94 !   +---------++--------+-----+----+--------+-----+   ! Ankle PT !!138     !0.95 !    !135     !0.93 !   +---------++--------+-----+----+--------+-----+   ! Great Toe!!85      !0.59 !    !76      !0.52 ! +---------++--------+-----+----+--------+-----+         - Brachial Pressure:Right: 145. Left:134.         - TYE:Right: 0.95. Left: 0.94.       Right Plethysmographic Results         - Right Brachial Pressure:145.         - Right TYE:0.95.       Left Plethysmographic Results         - Left Brachial Pressure:134.         - Left TYE:0.94.       Plethysmographic Digit Evaluation   +---------++--------+-----+-------------++--------+-----+-------------+   !         ! ! Right   !     ! Left         !!        !     !             !   +---------++--------+-----+-------------++--------+-----+-------------+   ! Location ! !Pressure! Ratio! PPG Wave Form! !Pressure! Ratio! PPG Wave Form!   +---------++--------+-----+-------------++--------+-----+-------------+   ! Great Toe!!85      !0.59 !             !!68      !0.52 !             !   +---------++--------+-----+-------------++--------+-----+-------------+         Occasional leg cramps & associated aching-tiredness sx >2month. No change since prior visit. New problem=abnml ABPI:see above. Vascular specialist appt:3/9/18:Dr. Panchito Segura. Associated w/nothing new. Worsened(aggravated) by walking. Improves by resting.   Smoking:plans to quit 3/1/18 using nicotine  Cardiac arrest ([facility] 10/31/2017    [facility]    Eczema     Epigastric pain     Gastric ulcer     Hyperaldosteronism ([facility] 12/26/2017    under care of endo    [facility]     [facility]    Mixed hyperlipidemia 12/15/2017    Tobacco use disorder              Social History   Substance Use Topics    Smoking status: Current Every Day Smoker     Packs/day: 1.00     Years: 20.00     Types: Cigarettes    Smokeless tobacco: Never Used    Alcohol use No      Comment: last drink 10/30/17:prior use was 2 shots whiskey nightly     History   Drug Use No         Review of Systems   Constitutional: Negative for activity change, appetite change, chills, diaphoresis, fatigue, fever and unexpected weight change. Respiratory: Negative for apnea, cough, choking, chest tightness, shortness of breath, wheezing and stridor. Cardiovascular: Negative for chest pain, palpitations and leg swelling. Gastrointestinal: Negative for abdominal distention, abdominal pain, anal bleeding, blood in stool, constipation, diarrhea, nausea, rectal pain and vomiting. Genitourinary: Negative for difficulty urinating. Musculoskeletal: Negative for arthralgias. Skin: Negative for color change, pallor, rash and wound. Neurological: Negative for dizziness, tremors, seizures, syncope, facial asymmetry, speech difficulty, weakness, light-headedness, numbness and headaches. Hematological: Negative for adenopathy. Psychiatric/Behavioral: Negative for sleep disturbance. Objective:   Physical Exam   Constitutional: She is oriented to person, place, and time. Vital signs are normal. She appears well-developed and well-nourished. She is cooperative. She does not have a sickly appearance. No distress.    HENT:   Nose: Nose normal.   Mouth/Throat: Uvula is midline, oropharynx is clear and moist and mucous membranes are normal.   Hearing intact to nml conversation   Eyes: Conjunctivae,

## 2018-03-05 DIAGNOSIS — E78.2 MIXED HYPERLIPIDEMIA: ICD-10-CM

## 2018-03-05 DIAGNOSIS — I10 ESSENTIAL HYPERTENSION: ICD-10-CM

## 2018-03-05 LAB
A/G RATIO: 1.8 (ref 1.1–2.2)
ALBUMIN SERPL-MCNC: 4.8 G/DL (ref 3.4–5)
ALP BLD-CCNC: 87 U/L (ref 40–129)
ALT SERPL-CCNC: 12 U/L (ref 10–40)
ANION GAP SERPL CALCULATED.3IONS-SCNC: 15 MMOL/L (ref 3–16)
AST SERPL-CCNC: 15 U/L (ref 15–37)
BILIRUB SERPL-MCNC: <0.2 MG/DL (ref 0–1)
BUN BLDV-MCNC: 13 MG/DL (ref 7–20)
CALCIUM SERPL-MCNC: 9.6 MG/DL (ref 8.3–10.6)
CHLORIDE BLD-SCNC: 101 MMOL/L (ref 99–110)
CHOLESTEROL, TOTAL: 272 MG/DL (ref 0–199)
CO2: 27 MMOL/L (ref 21–32)
CREAT SERPL-MCNC: 0.6 MG/DL (ref 0.6–1.1)
GFR AFRICAN AMERICAN: >60
GFR NON-AFRICAN AMERICAN: >60
GLOBULIN: 2.6 G/DL
GLUCOSE BLD-MCNC: 102 MG/DL (ref 70–99)
HDLC SERPL-MCNC: 71 MG/DL (ref 40–60)
LDL CHOLESTEROL CALCULATED: 158 MG/DL
POTASSIUM SERPL-SCNC: 4.4 MMOL/L (ref 3.5–5.1)
SODIUM BLD-SCNC: 143 MMOL/L (ref 136–145)
TOTAL PROTEIN: 7.4 G/DL (ref 6.4–8.2)
TRIGL SERPL-MCNC: 216 MG/DL (ref 0–150)
VLDLC SERPL CALC-MCNC: 43 MG/DL

## 2018-03-06 ENCOUNTER — HOSPITAL ENCOUNTER (OUTPATIENT)
Dept: VASCULAR LAB | Age: 53
Discharge: HOME | End: 2018-03-06
Attending: FAMILY MEDICINE | Admitting: FAMILY MEDICINE

## 2018-03-06 DIAGNOSIS — R25.2 BILATERAL LEG CRAMPS: Primary | ICD-10-CM

## 2018-03-06 DIAGNOSIS — R68.89 OTHER GENERAL SYMPTOMS AND SIGNS: ICD-10-CM

## 2018-03-06 DIAGNOSIS — R68.89 ABNORMAL ANKLE BRACHIAL INDEX (ABI): ICD-10-CM

## 2018-03-08 ENCOUNTER — TELEPHONE (OUTPATIENT)
Dept: FAMILY MEDICINE CLINIC | Age: 53
End: 2018-03-08

## 2018-03-26 ENCOUNTER — OFFICE VISIT (OUTPATIENT)
Dept: FAMILY MEDICINE CLINIC | Age: 53
End: 2018-03-26

## 2018-03-26 VITALS
BODY MASS INDEX: 21.48 KG/M2 | OXYGEN SATURATION: 98 % | HEIGHT: 63 IN | TEMPERATURE: 98.4 F | SYSTOLIC BLOOD PRESSURE: 131 MMHG | DIASTOLIC BLOOD PRESSURE: 77 MMHG | WEIGHT: 121.2 LBS | HEART RATE: 84 BPM | RESPIRATION RATE: 16 BRPM

## 2018-03-26 DIAGNOSIS — E87.6 HYPOKALEMIA: ICD-10-CM

## 2018-03-26 DIAGNOSIS — F17.200 TOBACCO USE DISORDER: ICD-10-CM

## 2018-03-26 DIAGNOSIS — M79.604 ACHING LEG SYNDROME OF RIGHT LOWER EXTREMITY: ICD-10-CM

## 2018-03-26 DIAGNOSIS — E27.8 ADRENAL NODULE (HCC): ICD-10-CM

## 2018-03-26 DIAGNOSIS — F10.10 ETOH ABUSE: ICD-10-CM

## 2018-03-26 DIAGNOSIS — I47.29 POLYMORPHIC VENTRICULAR TACHYCARDIA: ICD-10-CM

## 2018-03-26 DIAGNOSIS — Z12.11 COLON CANCER SCREENING: ICD-10-CM

## 2018-03-26 DIAGNOSIS — R73.09 ELEVATED GLUCOSE: ICD-10-CM

## 2018-03-26 DIAGNOSIS — R25.2 BILATERAL LEG CRAMPS: ICD-10-CM

## 2018-03-26 DIAGNOSIS — E78.2 MIXED HYPERLIPIDEMIA: ICD-10-CM

## 2018-03-26 DIAGNOSIS — Z12.39 BREAST CANCER SCREENING: ICD-10-CM

## 2018-03-26 DIAGNOSIS — I10 ESSENTIAL HYPERTENSION: Primary | ICD-10-CM

## 2018-03-26 DIAGNOSIS — R68.89 ABNORMAL ANKLE BRACHIAL INDEX (ABI): ICD-10-CM

## 2018-03-26 PROCEDURE — 99214 OFFICE O/P EST MOD 30 MIN: CPT | Performed by: FAMILY MEDICINE

## 2018-03-26 RX ORDER — NICOTINE 21 MG/24HR
1 PATCH, TRANSDERMAL 24 HOURS TRANSDERMAL EVERY 24 HOURS
Qty: 30 PATCH | Refills: 0 | Status: SHIPPED | OUTPATIENT
Start: 2018-03-26 | End: 2018-04-03 | Stop reason: CLARIF

## 2018-03-26 RX ORDER — ATORVASTATIN CALCIUM 20 MG/1
20 TABLET, FILM COATED ORAL EVERY EVENING
Qty: 30 TABLET | Refills: 1 | Status: SHIPPED | OUTPATIENT
Start: 2018-03-26 | End: 2018-05-24 | Stop reason: SDUPTHER

## 2018-03-26 ASSESSMENT — ENCOUNTER SYMPTOMS
NAUSEA: 0
WHEEZING: 0
CONSTIPATION: 0
APNEA: 0
ABDOMINAL DISTENTION: 0
CHEST TIGHTNESS: 0
VOMITING: 0
ABDOMINAL PAIN: 0
DIARRHEA: 0
STRIDOR: 0
CHOKING: 0
ANAL BLEEDING: 0
COUGH: 0
BLOOD IN STOOL: 0
RECTAL PAIN: 0
SHORTNESS OF BREATH: 0

## 2018-03-26 NOTE — PROGRESS NOTES
52 year(s)        Accession Number  [id number]           Room Number         OP        Corporate ID      [id number]            Sonographer         Chris Santoyo RVT        Ordering          Rodrigo Ly       Interpreting        [facility]    Physician         Edda Gutierrez MD           Physician           Readers                                                              Yasmeen Olvera MD       Procedure       Type of Study:        Extremities Arteries:Lower Extremities Arterial Duplex, VL LOWER EXTREMITY    ARTERIES DUPLEX BILATERAL.        Vascular Sonographer Report       Indications for Study:Leg pain and PVD.       Additional Indications:Patient seen in February 2018 with a normal TYE with   abnormal toe pressures. Patient describes bilateral calf and distal thigh pain   with exercise and pain in legs at night while sleeping.       Arterial Duplex Scan: Grayscale and color imaging of the extremity arteries,   including Doppler spectral waveform analysis.       Impressions   Right Impression   The right ankle/brachial index is 1.07 () and 0.96 (). The right common femoral artery demonstrates multiphasic flow which is not   suggestive of significant arterial inflow disease. Minimal intimal thickening common femoral, superficial femoral, profunda   femoral and popliteal arteries. Left Impression   The left ankle/brachial index is 1.07 () and 0.96 (). The left common femoral artery demonstrates multiphasic flow which is not   suggestive of significant arterial inflow disease. Minimal intimal thickening common femoral, superficial femoral, profunda   femoral and popliteal arteries.    No previous imaging exam available for comparison.       Conclusions        Summary        Normal bilateral ankle brachial indices at rest.        No evidence of

## 2018-04-03 ENCOUNTER — OFFICE VISIT (OUTPATIENT)
Dept: FAMILY MEDICINE CLINIC | Age: 53
End: 2018-04-03

## 2018-04-03 ENCOUNTER — TELEPHONE (OUTPATIENT)
Dept: FAMILY MEDICINE CLINIC | Age: 53
End: 2018-04-03

## 2018-04-03 VITALS
WEIGHT: 123.6 LBS | RESPIRATION RATE: 16 BRPM | DIASTOLIC BLOOD PRESSURE: 70 MMHG | BODY MASS INDEX: 21.9 KG/M2 | OXYGEN SATURATION: 98 % | HEIGHT: 63 IN | TEMPERATURE: 98 F | SYSTOLIC BLOOD PRESSURE: 102 MMHG | HEART RATE: 86 BPM

## 2018-04-03 DIAGNOSIS — G45.8 OTHER SPECIFIED TRANSIENT CEREBRAL ISCHEMIAS: ICD-10-CM

## 2018-04-03 DIAGNOSIS — R47.01 VERBAL APHASIA: Primary | ICD-10-CM

## 2018-04-03 DIAGNOSIS — F17.200 TOBACCO USE DISORDER: ICD-10-CM

## 2018-04-03 DIAGNOSIS — R42 DIZZINESS: ICD-10-CM

## 2018-04-03 DIAGNOSIS — R29.898 RIGHT ARM WEAKNESS: ICD-10-CM

## 2018-04-03 PROCEDURE — 99214 OFFICE O/P EST MOD 30 MIN: CPT | Performed by: FAMILY MEDICINE

## 2018-04-03 ASSESSMENT — ENCOUNTER SYMPTOMS
ABDOMINAL DISTENTION: 0
APNEA: 0
RECTAL PAIN: 0
DIARRHEA: 0
VOMITING: 0
ANAL BLEEDING: 0
CHEST TIGHTNESS: 0
WHEEZING: 0
STRIDOR: 0
BLOOD IN STOOL: 0
SHORTNESS OF BREATH: 0
COUGH: 0
NAUSEA: 0
ABDOMINAL PAIN: 0
CONSTIPATION: 0
CHOKING: 0

## 2018-04-12 PROBLEM — N39.0 UTI (URINARY TRACT INFECTION): Status: RESOLVED | Noted: 2017-10-30 | Resolved: 2018-04-12

## 2018-06-14 DIAGNOSIS — E78.2 MIXED HYPERLIPIDEMIA: ICD-10-CM

## 2018-06-14 DIAGNOSIS — I10 ESSENTIAL HYPERTENSION: ICD-10-CM

## 2018-06-14 DIAGNOSIS — R73.09 ELEVATED GLUCOSE: ICD-10-CM

## 2018-06-14 LAB
A/G RATIO: 1.6 (ref 1.1–2.2)
ALBUMIN SERPL-MCNC: 4.4 G/DL (ref 3.4–5)
ALP BLD-CCNC: 85 U/L (ref 40–129)
ALT SERPL-CCNC: 17 U/L (ref 10–40)
ANION GAP SERPL CALCULATED.3IONS-SCNC: 16 MMOL/L (ref 3–16)
AST SERPL-CCNC: 20 U/L (ref 15–37)
BILIRUB SERPL-MCNC: 0.4 MG/DL (ref 0–1)
BUN BLDV-MCNC: 10 MG/DL (ref 7–20)
CALCIUM SERPL-MCNC: 9.6 MG/DL (ref 8.3–10.6)
CHLORIDE BLD-SCNC: 103 MMOL/L (ref 99–110)
CHOLESTEROL, TOTAL: 221 MG/DL (ref 0–199)
CO2: 24 MMOL/L (ref 21–32)
CREAT SERPL-MCNC: 0.6 MG/DL (ref 0.6–1.1)
ESTIMATED AVERAGE GLUCOSE: 116.9 MG/DL
GFR AFRICAN AMERICAN: >60
GFR NON-AFRICAN AMERICAN: >60
GLOBULIN: 2.7 G/DL
GLUCOSE BLD-MCNC: 101 MG/DL (ref 70–99)
HBA1C MFR BLD: 5.7 %
HDLC SERPL-MCNC: 59 MG/DL (ref 40–60)
LDL CHOLESTEROL CALCULATED: ABNORMAL MG/DL
LDL CHOLESTEROL DIRECT: 107 MG/DL
POTASSIUM SERPL-SCNC: 4.1 MMOL/L (ref 3.5–5.1)
SODIUM BLD-SCNC: 143 MMOL/L (ref 136–145)
TOTAL PROTEIN: 7.1 G/DL (ref 6.4–8.2)
TRIGL SERPL-MCNC: 491 MG/DL (ref 0–150)
VLDLC SERPL CALC-MCNC: ABNORMAL MG/DL

## 2018-06-27 ENCOUNTER — OFFICE VISIT (OUTPATIENT)
Dept: FAMILY MEDICINE CLINIC | Age: 53
End: 2018-06-27

## 2018-06-27 VITALS
WEIGHT: 131 LBS | RESPIRATION RATE: 16 BRPM | HEIGHT: 63 IN | BODY MASS INDEX: 23.21 KG/M2 | OXYGEN SATURATION: 98 % | DIASTOLIC BLOOD PRESSURE: 70 MMHG | SYSTOLIC BLOOD PRESSURE: 120 MMHG | HEART RATE: 82 BPM | TEMPERATURE: 98.8 F

## 2018-06-27 DIAGNOSIS — E27.8 ADRENAL NODULE (HCC): ICD-10-CM

## 2018-06-27 DIAGNOSIS — I10 ESSENTIAL HYPERTENSION: Primary | ICD-10-CM

## 2018-06-27 DIAGNOSIS — E78.2 MIXED HYPERLIPIDEMIA: ICD-10-CM

## 2018-06-27 DIAGNOSIS — R94.31 LONG QT INTERVAL: ICD-10-CM

## 2018-06-27 DIAGNOSIS — R73.03 PREDIABETES: ICD-10-CM

## 2018-06-27 PROCEDURE — 99214 OFFICE O/P EST MOD 30 MIN: CPT | Performed by: FAMILY MEDICINE

## 2018-06-27 RX ORDER — ATORVASTATIN CALCIUM 40 MG/1
TABLET, FILM COATED ORAL
Qty: 30 TABLET | Refills: 2 | Status: SHIPPED | OUTPATIENT
Start: 2018-06-27 | End: 2018-07-28 | Stop reason: SDUPTHER

## 2018-06-27 ASSESSMENT — ENCOUNTER SYMPTOMS
RECTAL PAIN: 0
BLOOD IN STOOL: 0
CONSTIPATION: 0
APNEA: 0
SHORTNESS OF BREATH: 0
VOMITING: 0
CHOKING: 0
STRIDOR: 0
ANAL BLEEDING: 0
COUGH: 0
ABDOMINAL PAIN: 0
DIARRHEA: 0
ABDOMINAL DISTENTION: 0
WHEEZING: 0
CHEST TIGHTNESS: 0
NAUSEA: 0

## 2018-06-27 ASSESSMENT — PATIENT HEALTH QUESTIONNAIRE - PHQ9
1. LITTLE INTEREST OR PLEASURE IN DOING THINGS: 0
SUM OF ALL RESPONSES TO PHQ QUESTIONS 1-9: 0
2. FEELING DOWN, DEPRESSED OR HOPELESS: 0
SUM OF ALL RESPONSES TO PHQ9 QUESTIONS 1 & 2: 0

## 2018-07-17 ENCOUNTER — HOSPITAL ENCOUNTER (OUTPATIENT)
Dept: DIABETES SERVICES | Age: 53
Discharge: HOME | End: 2018-07-17
Attending: FAMILY MEDICINE | Admitting: FAMILY MEDICINE

## 2018-07-17 DIAGNOSIS — Z71.9 HEALTH EDUCATION/COUNSELING: Primary | ICD-10-CM

## 2018-07-18 NOTE — PROGRESS NOTES
Patient attended Pre-Diabetes Class.   The class covered and patient demonstrated understanding of the following topics:   Definition of Pre-Diabetes, including laboratory values   Balancing food, activity, and weight management   Carbohydrate counting with individualized goals   Reading food labels and managing portion sizes   Overall healthy eating guidelines   Choosing healthful fats   Increasing physical activity to control blood sugars and weight    Total time spent with patient: 120 minutes

## 2018-07-28 DIAGNOSIS — E78.2 MIXED HYPERLIPIDEMIA: ICD-10-CM

## 2018-07-29 RX ORDER — ATORVASTATIN CALCIUM 40 MG/1
TABLET, FILM COATED ORAL
Qty: 90 TABLET | Refills: 1 | Status: SHIPPED | OUTPATIENT
Start: 2018-07-29 | End: 2019-05-21 | Stop reason: SDUPTHER

## 2018-10-10 ENCOUNTER — HOSPITAL ENCOUNTER (EMERGENCY)
Age: 53
Discharge: HOME | End: 2018-10-10
Attending: EMERGENCY MEDICINE
Payer: COMMERCIAL

## 2018-10-10 VITALS
HEART RATE: 75 BPM | WEIGHT: 125 LBS | TEMPERATURE: 99 F | HEIGHT: 63 IN | OXYGEN SATURATION: 99 % | BODY MASS INDEX: 22.15 KG/M2 | RESPIRATION RATE: 18 BRPM | DIASTOLIC BLOOD PRESSURE: 86 MMHG | SYSTOLIC BLOOD PRESSURE: 160 MMHG

## 2018-10-10 DIAGNOSIS — I10 HYPERTENSION, UNCONTROLLED: ICD-10-CM

## 2018-10-10 DIAGNOSIS — R42 LIGHTHEADEDNESS: Primary | ICD-10-CM

## 2018-10-10 DIAGNOSIS — R42 DIZZINESS: ICD-10-CM

## 2018-10-10 LAB
ANION GAP SERPL CALCULATED.3IONS-SCNC: 15 MMOL/L (ref 3–16)
BASOPHILS ABSOLUTE: 0.1 K/UL (ref 0–0.2)
BASOPHILS RELATIVE PERCENT: 0.9 %
BUN BLDV-MCNC: 10 MG/DL (ref 7–20)
CALCIUM SERPL-MCNC: 8.4 MG/DL (ref 8.3–10.6)
CHLORIDE BLD-SCNC: 105 MMOL/L (ref 99–110)
CO2: 29 MMOL/L (ref 21–32)
CREAT SERPL-MCNC: 0.7 MG/DL (ref 0.6–1.1)
EOSINOPHILS ABSOLUTE: 0.4 K/UL (ref 0–0.6)
EOSINOPHILS RELATIVE PERCENT: 3.7 %
GFR AFRICAN AMERICAN: >60
GFR NON-AFRICAN AMERICAN: >60
GLUCOSE BLD-MCNC: 109 MG/DL (ref 70–99)
HCT VFR BLD CALC: 42.3 % (ref 36–48)
HEMOGLOBIN: 14.2 G/DL (ref 12–16)
LYMPHOCYTES ABSOLUTE: 2.6 K/UL (ref 1–5.1)
LYMPHOCYTES RELATIVE PERCENT: 26.9 %
MCH RBC QN AUTO: 30.4 PG (ref 26–34)
MCHC RBC AUTO-ENTMCNC: 33.6 G/DL (ref 31–36)
MCV RBC AUTO: 90.4 FL (ref 80–100)
MONOCYTES ABSOLUTE: 0.8 K/UL (ref 0–1.3)
MONOCYTES RELATIVE PERCENT: 8.6 %
NEUTROPHILS ABSOLUTE: 5.7 K/UL (ref 1.7–7.7)
NEUTROPHILS RELATIVE PERCENT: 59.9 %
PDW BLD-RTO: 13.5 % (ref 12.4–15.4)
PLATELET # BLD: 201 K/UL (ref 135–450)
PMV BLD AUTO: 8.5 FL (ref 5–10.5)
POTASSIUM SERPL-SCNC: 3.7 MMOL/L (ref 3.5–5.1)
RBC # BLD: 4.68 M/UL (ref 4–5.2)
SODIUM BLD-SCNC: 149 MMOL/L (ref 136–145)
TROPONIN: <0.01 NG/ML
WBC # BLD: 9.6 K/UL (ref 4–11)

## 2018-10-10 PROCEDURE — 99284 EMERGENCY DEPT VISIT MOD MDM: CPT

## 2018-10-10 PROCEDURE — 6370000000 HC RX 637 (ALT 250 FOR IP): Performed by: PHYSICIAN ASSISTANT

## 2018-10-10 PROCEDURE — 96365 THER/PROPH/DIAG IV INF INIT: CPT

## 2018-10-10 PROCEDURE — 36415 COLL VENOUS BLD VENIPUNCTURE: CPT

## 2018-10-10 PROCEDURE — 85025 COMPLETE CBC W/AUTO DIFF WBC: CPT

## 2018-10-10 PROCEDURE — 93010 ELECTROCARDIOGRAM REPORT: CPT | Performed by: INTERNAL MEDICINE

## 2018-10-10 PROCEDURE — 80048 BASIC METABOLIC PNL TOTAL CA: CPT

## 2018-10-10 PROCEDURE — 2580000003 HC RX 258: Performed by: EMERGENCY MEDICINE

## 2018-10-10 PROCEDURE — 84484 ASSAY OF TROPONIN QUANT: CPT

## 2018-10-10 PROCEDURE — 96366 THER/PROPH/DIAG IV INF ADDON: CPT

## 2018-10-10 PROCEDURE — 93005 ELECTROCARDIOGRAM TRACING: CPT | Performed by: EMERGENCY MEDICINE

## 2018-10-10 RX ORDER — DEXTROSE MONOHYDRATE 50 MG/ML
INJECTION, SOLUTION INTRAVENOUS CONTINUOUS
Status: DISCONTINUED | OUTPATIENT
Start: 2018-10-10 | End: 2018-10-10

## 2018-10-10 RX ORDER — LISINOPRIL 10 MG/1
20 TABLET ORAL ONCE
Status: COMPLETED | OUTPATIENT
Start: 2018-10-10 | End: 2018-10-10

## 2018-10-10 RX ORDER — LISINOPRIL 20 MG/1
40 TABLET ORAL DAILY
COMMUNITY
End: 2018-11-01 | Stop reason: DRUGHIGH

## 2018-10-10 RX ORDER — LORATADINE 10 MG/1
10 CAPSULE, LIQUID FILLED ORAL DAILY
COMMUNITY
End: 2019-10-02 | Stop reason: CLARIF

## 2018-10-10 RX ORDER — METOPROLOL SUCCINATE 50 MG/1
50 TABLET, EXTENDED RELEASE ORAL DAILY
Status: DISCONTINUED | OUTPATIENT
Start: 2018-10-10 | End: 2018-10-10 | Stop reason: HOSPADM

## 2018-10-10 RX ORDER — DEXTROSE MONOHYDRATE 50 MG/ML
1000 INJECTION, SOLUTION INTRAVENOUS CONTINUOUS
Status: DISCONTINUED | OUTPATIENT
Start: 2018-10-10 | End: 2018-10-10 | Stop reason: HOSPADM

## 2018-10-10 RX ORDER — AMLODIPINE BESYLATE 5 MG/1
5 TABLET ORAL ONCE
Status: COMPLETED | OUTPATIENT
Start: 2018-10-10 | End: 2018-10-10

## 2018-10-10 RX ADMIN — LISINOPRIL 20 MG: 10 TABLET ORAL at 14:58

## 2018-10-10 RX ADMIN — AMLODIPINE BESYLATE 5 MG: 5 TABLET ORAL at 18:58

## 2018-10-10 RX ADMIN — METOPROLOL SUCCINATE 25 MG: 25 TABLET, FILM COATED, EXTENDED RELEASE ORAL at 17:11

## 2018-10-10 RX ADMIN — DEXTROSE MONOHYDRATE 1000 ML: 50 INJECTION, SOLUTION INTRAVENOUS at 15:02

## 2018-10-10 NOTE — ED NOTES
Bed: [address]-02  Expected date:   Expected time:   Means of arrival:   Comments:  Ilan Allison RN  10/10/18 [id number]

## 2018-10-10 NOTE — ED PROVIDER NOTES
1L D5W and 20mg Lisinopril ordered for patient per Dr. Virginia Khalil, nephrology, request.     [place], [place]  10/10/18 [id number]
Cardiac arrest [facility]) 10/31/2017    Hospitalized [facility] hosp:cardiologist:Dr. Darek Rider    Eczema     Epigastric pain     Gastric ulcer     Hyperaldosteronism ([address] 12/26/2017    under care of endo    Hypertension     per nephro-HTN center:Dr. Lamont Campo    Mixed hyperlipidemia 12/15/2017    Prediabetes     Tobacco use disorder          SURGICAL HISTORY     Past Surgical History:   Procedure Laterality Date    CHOLECYSTECTOMY, LAPAROSCOPIC  10/10/2016    LAPAROSCOPIC CHOLECYSTECTOMY WITH CHOLANGIOGRAM    KNEE SURGERY      left torn meniscus repair    MANDIBLE SURGERY      SHOULDER SURGERY      SINUS SURGERY      TONSILLECTOMY      TUBAL LIGATION      UPPER GASTROINTESTINAL ENDOSCOPY  07/21/2016    WISDOM TOOTH EXTRACTION           CURRENTMEDICATIONS       Discharge Medication List as of 10/10/2018  7:16 PM      CONTINUE these medications which have NOT CHANGED    Details   lisinopril (PRINIVIL;ZESTRIL) 20 MG tablet Take 10 mg by mouth dailyHistorical Med      loratadine (CLARITIN) 10 MG capsule Take 10 mg by mouth dailyHistorical Med      atorvastatin (LIPITOR) 40 MG tablet TAKE 1 TABLET BY MOUTH IN THE EVENING , Disp-90 tablet, R-1Normal      Handicap Placard MISC Starting Mon 3/26/2018, Disp-1 each, R-0, PrintDuration:5years      metoprolol succinate (TOPROL XL) 25 MG extended release tablet Take 25 mg by mouth dailyHistorical Med      amLODIPine (NORVASC) 10 MG tablet Take 10 mg by mouth dailyHistorical Med      Potassium Chloride (KLOR-CON PO) Take 20 mEq by mouthHistorical Med      folic acid (FOLVITE) 1 MG tablet Take 1 tablet by mouth daily, Disp-30 tablet, R-3Normal      nicotine (NICODERM CQ) 21 MG/24HR Place 1 patch onto the skin daily, Disp-14 patch, R-0Print               ALLERGIES     Nuts [peanut-containing drug products]; Shellfish-derived products;  Penicillins; Azithromycin; and Erythromycin    FAMILYHISTORY       Family History   Problem Relation Age of Onset    Heart Disease

## 2018-10-10 NOTE — CONSULTS
admission, patient's K+ was found to be 3.7. Denies dysuria, hematuria, foamy urine, NSAID use, recent IV contrast exposure. Currently, patient has no active complaints or uremic symptoms. Nephrology consulted for evaluation/management of hypertensive urgency. Past Medical History:        Diagnosis Date    Abdominal pain     Cardiac arrest [facility]) 10/31/2017    Hospitalized [facility] hosp:cardiologist:Dr. Shalom Mohamud    Eczema     Epigastric pain     Gastric ulcer     Hyperaldosteronism ([address] 12/26/2017    under care of endo    Hypertension     per nephro-HTN center:Dr. Botello    Mixed hyperlipidemia 12/15/2017    Prediabetes     Tobacco use disorder        Past Surgical History:        Procedure Laterality Date    CHOLECYSTECTOMY, LAPAROSCOPIC  10/10/2016    LAPAROSCOPIC CHOLECYSTECTOMY WITH CHOLANGIOGRAM    KNEE SURGERY      left torn meniscus repair    MANDIBLE SURGERY      SHOULDER SURGERY      SINUS SURGERY      TONSILLECTOMY      TUBAL LIGATION      UPPER GASTROINTESTINAL ENDOSCOPY  07/21/2016    WISDOM TOOTH EXTRACTION         Current Medications:    No current facility-administered medications on file prior to encounter. Current Outpatient Prescriptions on File Prior to Encounter   Medication Sig Dispense Refill    atorvastatin (LIPITOR) 40 MG tablet TAKE 1 TABLET BY MOUTH IN THE EVENING  90 tablet 1    Handicap Placard MISC by Does not apply route Duration:5years 1 each 0    metoprolol succinate (TOPROL XL) 25 MG extended release tablet Take 25 mg by mouth daily      amLODIPine (NORVASC) 10 MG tablet Take 10 mg by mouth daily      Potassium Chloride (KLOR-CON PO) Take 20 mEq by mouth      folic acid (FOLVITE) 1 MG tablet Take 1 tablet by mouth daily 30 tablet 3    nicotine (NICODERM CQ) 21 MG/24HR Place 1 patch onto the skin daily 14 patch 0       Allergies:  Nuts [peanut-containing drug products]; Shellfish-derived products;  Penicillins; Azithromycin; and

## 2018-10-10 NOTE — ED NOTES
PT discharged at this time. PT given instructions for follow up and prescriptions. PT denies any needs or questions.           Tarah Corona RN  10/10/18 [id number]

## 2018-10-12 LAB
EKG ATRIAL RATE: 72 BPM
EKG DIAGNOSIS: NORMAL
EKG P AXIS: 54 DEGREES
EKG P-R INTERVAL: 136 MS
EKG Q-T INTERVAL: 418 MS
EKG QRS DURATION: 78 MS
EKG QTC CALCULATION (BAZETT): 457 MS
EKG R AXIS: 35 DEGREES
EKG T AXIS: 53 DEGREES
EKG VENTRICULAR RATE: 72 BPM

## 2018-11-01 ENCOUNTER — OFFICE VISIT (OUTPATIENT)
Dept: ENDOCRINOLOGY | Age: 53
End: 2018-11-01
Payer: COMMERCIAL

## 2018-11-01 VITALS
OXYGEN SATURATION: 98 % | HEART RATE: 72 BPM | WEIGHT: 129.4 LBS | SYSTOLIC BLOOD PRESSURE: 138 MMHG | BODY MASS INDEX: 22.93 KG/M2 | DIASTOLIC BLOOD PRESSURE: 73 MMHG | HEIGHT: 63 IN

## 2018-11-01 DIAGNOSIS — E26.9 HYPERALDOSTERONISM (HCC): Primary | ICD-10-CM

## 2018-11-01 DIAGNOSIS — E27.8 ADRENAL NODULE (HCC): ICD-10-CM

## 2018-11-01 PROCEDURE — 99214 OFFICE O/P EST MOD 30 MIN: CPT | Performed by: INTERNAL MEDICINE

## 2018-11-01 RX ORDER — CARVEDILOL 12.5 MG/1
12.5 TABLET ORAL 2 TIMES DAILY WITH MEALS
COMMUNITY
End: 2022-06-03 | Stop reason: CLARIF

## 2018-11-01 RX ORDER — LISINOPRIL 40 MG/1
40 TABLET ORAL DAILY
COMMUNITY
End: 2019-10-02 | Stop reason: CLARIF

## 2018-11-01 RX ORDER — DEXAMETHASONE 1 MG
TABLET ORAL
Qty: 1 TABLET | Refills: 0 | Status: SHIPPED | OUTPATIENT
Start: 2018-11-01 | End: 2019-10-02 | Stop reason: CLARIF

## 2018-11-01 NOTE — PATIENT INSTRUCTIONS
Patient Education        Dizziness: Care Instructions  Your Care Instructions  Dizziness is the feeling of unsteadiness or fuzziness in your head. It is different than having vertigo, which is a feeling that the room is spinning or that you are moving or falling. It is also different from lightheadedness, which is the feeling that you are about to faint. It can be hard to know what causes dizziness. Some people feel dizzy when they have migraine headaches. Sometimes bouts of flu can make you feel dizzy. Some medical conditions, such as heart problems or high blood pressure, can make you feel dizzy. Many medicines can cause dizziness, including medicines for high blood pressure, pain, or anxiety. If a medicine causes your symptoms, your doctor may recommend that you stop or change the medicine. If it is a problem with your heart, you may need medicine to help your heart work better. If there is no clear reason for your symptoms, your doctor may suggest watching and waiting for a while to see if the dizziness goes away on its own. Follow-up care is a key part of your treatment and safety. Be sure to make and go to all appointments, and call your doctor if you are having problems. It's also a good idea to know your test results and keep a list of the medicines you take. How can you care for yourself at home? · If your doctor recommends or prescribes medicine, take it exactly as directed. Call your doctor if you think you are having a problem with your medicine. · Do not drive while you feel dizzy. · Try to prevent falls. Steps you can take include:  ¨ Using nonskid mats, adding grab bars near the tub, and using night-lights. ¨ Clearing your home so that walkways are free of anything you might trip on. ¨ Letting family and friends know that you have been feeling dizzy. This will help them know how to help you. When should you call for help? Call 911 anytime you think you may need emergency care.  For example, call if:    · You passed out (lost consciousness).     · You have dizziness along with symptoms of a heart attack. These may include:  ¨ Chest pain or pressure, or a strange feeling in the chest.  ¨ Sweating. ¨ Shortness of breath. ¨ Nausea or vomiting. ¨ Pain, pressure, or a strange feeling in the back, neck, jaw, or upper belly or in one or both shoulders or arms. ¨ Lightheadedness or sudden weakness. ¨ A fast or irregular heartbeat.     · You have symptoms of a stroke. These may include:  ¨ Sudden numbness, tingling, weakness, or loss of movement in your face, arm, or leg, especially on only one side of your body. ¨ Sudden vision changes. ¨ Sudden trouble speaking. ¨ Sudden confusion or trouble understanding simple statements. ¨ Sudden problems with walking or balance. ¨ A sudden, severe headache that is different from past headaches.    Call your doctor now or seek immediate medical care if:    · You feel dizzy and have a fever, headache, or ringing in your ears.     · You have new or increased nausea and vomiting.     · Your dizziness does not go away or comes back.    Watch closely for changes in your health, and be sure to contact your doctor if:    · You do not get better as expected. Where can you learn more? Go to https://[employer] Response.[employer]. org and sign in to your [employer] account. Enter N101 in the [facility] box to learn more about \"Dizziness: Care Instructions. \"     If you do not have an account, please click on the \"Sign Up Now\" link. Current as of: November 20, 2017  Content Version: 11.7  © 2835-6715 [employer]. Care instructions adapted under license by [facility] ([facility]). If you have questions about a medical condition or this instruction, always ask your healthcare professional. [place] [address] any warranty or liability for your use of this information.

## 2018-11-01 NOTE — PROGRESS NOTES
interpreted by ER physician. Confirmed by MD, ER ([id number]),  Andressa James ([id number]) on 4/3/2018 5:20:04 PM     There may be more visits with results that are not included. Assessment and Plan       Angelica Rudolph was seen today for follow-up. Diagnoses and all orders for this visit:    Hyperaldosteronism ([facility] [address]  -     Metanephrines Plasma Free; Future  -     Renin; Future  -     Aldosterone; Future  -     ACTH; Future  -     Basic Metabolic Panel; Future    Adrenal nodule (HCC)  -     Metanephrines Plasma Free; Future  -     Renin; Future  -     Aldosterone; Future  -     ACTH; Future  -     Basic Metabolic Panel; Future  -     dexamethasone (DECADRON) 1 MG tablet; One mg tablet night before blood work, between 11pm to midnight.   -     Cortisol AM, Total; Future  -     MRI adrenal glands with and without contrast; Future          1: Bilateral adrenal nodules with HTN and hypotkalemia   Likely benign and non functional     Work up normal for Renin, toby, metanephrines, Dex suppression test, thyroid testing Dec 2017     Will repeat hormonal work up     Also repeat imaging     RTC in 12 months            Electronically signed by Shoaib Ambriz MD on 11/1/2018 at 3:04 PM

## 2018-11-06 DIAGNOSIS — F41.9 ANXIETY: Primary | ICD-10-CM

## 2018-11-06 RX ORDER — DICYCLOMINE HYDROCHLORIDE 10 MG/1
CAPSULE ORAL
Qty: 2 CAPSULE | Refills: 0 | OUTPATIENT
Start: 2018-11-06

## 2018-11-06 RX ORDER — LORAZEPAM 1 MG/1
TABLET ORAL
Qty: 2 TABLET | Refills: 0 | Status: SHIPPED | OUTPATIENT
Start: 2018-11-06 | End: 2018-12-06

## 2018-11-07 DIAGNOSIS — E27.8 ADRENAL NODULE (HCC): ICD-10-CM

## 2018-11-07 DIAGNOSIS — R73.09 ELEVATED GLUCOSE: ICD-10-CM

## 2018-11-07 DIAGNOSIS — E78.2 MIXED HYPERLIPIDEMIA: ICD-10-CM

## 2018-11-07 DIAGNOSIS — R73.03 PREDIABETES: ICD-10-CM

## 2018-11-07 DIAGNOSIS — E87.6 HYPOKALEMIA: ICD-10-CM

## 2018-11-07 DIAGNOSIS — E26.9 HYPERALDOSTERONISM (HCC): ICD-10-CM

## 2018-11-07 LAB
A/G RATIO: 1.8 (ref 1.1–2.2)
ALBUMIN SERPL-MCNC: 4.4 G/DL (ref 3.4–5)
ALP BLD-CCNC: 82 U/L (ref 40–129)
ALT SERPL-CCNC: 12 U/L (ref 10–40)
ANION GAP SERPL CALCULATED.3IONS-SCNC: 13 MMOL/L (ref 3–16)
ANION GAP SERPL CALCULATED.3IONS-SCNC: 13 MMOL/L (ref 3–16)
AST SERPL-CCNC: 17 U/L (ref 15–37)
BILIRUB SERPL-MCNC: 0.3 MG/DL (ref 0–1)
BUN BLDV-MCNC: 16 MG/DL (ref 7–20)
BUN BLDV-MCNC: 16 MG/DL (ref 7–20)
CALCIUM SERPL-MCNC: 9.5 MG/DL (ref 8.3–10.6)
CALCIUM SERPL-MCNC: 9.6 MG/DL (ref 8.3–10.6)
CHLORIDE BLD-SCNC: 107 MMOL/L (ref 99–110)
CHLORIDE BLD-SCNC: 107 MMOL/L (ref 99–110)
CHOLESTEROL, TOTAL: 190 MG/DL (ref 0–199)
CO2: 25 MMOL/L (ref 21–32)
CO2: 25 MMOL/L (ref 21–32)
CREAT SERPL-MCNC: 0.8 MG/DL (ref 0.6–1.1)
CREAT SERPL-MCNC: 0.8 MG/DL (ref 0.6–1.1)
GFR AFRICAN AMERICAN: >60
GFR AFRICAN AMERICAN: >60
GFR NON-AFRICAN AMERICAN: >60
GFR NON-AFRICAN AMERICAN: >60
GLOBULIN: 2.5 G/DL
GLUCOSE BLD-MCNC: 95 MG/DL (ref 70–99)
GLUCOSE BLD-MCNC: 96 MG/DL (ref 70–99)
HDLC SERPL-MCNC: 67 MG/DL (ref 40–60)
LDL CHOLESTEROL CALCULATED: 101 MG/DL
POTASSIUM SERPL-SCNC: 4.3 MMOL/L (ref 3.5–5.1)
POTASSIUM SERPL-SCNC: 4.5 MMOL/L (ref 3.5–5.1)
SODIUM BLD-SCNC: 145 MMOL/L (ref 136–145)
SODIUM BLD-SCNC: 145 MMOL/L (ref 136–145)
TOTAL PROTEIN: 6.9 G/DL (ref 6.4–8.2)
TRIGL SERPL-MCNC: 110 MG/DL (ref 0–150)
VLDLC SERPL CALC-MCNC: 22 MG/DL

## 2018-11-08 DIAGNOSIS — E27.8 ADRENAL NODULE (HCC): ICD-10-CM

## 2018-11-08 LAB
CORTISOL - AM: 1.8 UG/DL (ref 4.3–22.4)
ESTIMATED AVERAGE GLUCOSE: 116.9 MG/DL
HBA1C MFR BLD: 5.7 %

## 2018-11-09 ENCOUNTER — TELEPHONE (OUTPATIENT)
Dept: ENDOCRINOLOGY | Age: 53
End: 2018-11-09

## 2018-11-10 LAB
ALDOSTERONE: <3 NG/DL
RENIN ACTIVITY: 1.7 NG/ML/HR

## 2018-11-12 DIAGNOSIS — F41.9 ANXIETY: ICD-10-CM

## 2018-11-12 LAB
METANEPH/PLASMA INTERP: NORMAL
METANEPHRINE FREE PLASMA: 0.1 NMOL/L (ref 0–0.49)
NORMETANEPHRINE FREE PLASMA: 0.37 NMOL/L (ref 0–0.89)

## 2018-11-12 RX ORDER — LORAZEPAM 1 MG/1
TABLET ORAL
Qty: 2 TABLET | Refills: 0 | Status: CANCELLED | OUTPATIENT
Start: 2018-11-12 | End: 2018-12-12

## 2018-11-13 LAB — ADRENOCORTICOTROPIC HORMONE: 22 PG/ML (ref 6–58)

## 2018-11-14 ENCOUNTER — TELEPHONE (OUTPATIENT)
Dept: ENDOCRINOLOGY | Age: 53
End: 2018-11-14

## 2018-11-14 NOTE — TELEPHONE ENCOUNTER
Pt called to speak with nurse April. She was given a pill to take prior to getting her labs completed. Since taking the pill she has experienced pain in her abdomen underneath her belly button. It's hard to the touch and feels like her seat belt was on too tight, and is very uncomfortable.  She would like a call asap [phone]

## 2018-11-16 ENCOUNTER — TELEPHONE (OUTPATIENT)
Dept: ENDOCRINOLOGY | Age: 53
End: 2018-11-16

## 2018-11-16 NOTE — TELEPHONE ENCOUNTER
Talita Valdivia from [address] called [phone] option [phone], pt is to have a MRI Abdomen with/without contrast he is requesting progress notes to be faxed to [phone]

## 2018-11-19 ENCOUNTER — TELEPHONE (OUTPATIENT)
Dept: ENDOCRINOLOGY | Age: 53
End: 2018-11-19

## 2018-11-19 NOTE — TELEPHONE ENCOUNTER
[facility] called stating they needed the CT of abdomen faxed over to them. PT is there to have a MRI done. Faxed the CT of abdomen results today.

## 2018-11-20 ENCOUNTER — TELEPHONE (OUTPATIENT)
Dept: FAMILY MEDICINE CLINIC | Age: 53
End: 2018-11-20

## 2018-11-20 NOTE — TELEPHONE ENCOUNTER
[phone]   Brooke Egan    PT had Mri done yesterday at [address] [place] [address] in [place]. Wants to know results. PT wants us to call her. She didn't make a follow up appt because she wants to make sure we have the results before she schedules.     Last seen 6/27/2018

## 2018-11-20 NOTE — TELEPHONE ENCOUNTER
Pt informed. Pt states that she just wanted for her PCP to have the results in her file.    Records requested  Close Encounter

## 2019-02-21 ENCOUNTER — OFFICE VISIT (OUTPATIENT)
Dept: FAMILY MEDICINE CLINIC | Age: 54
End: 2019-02-21
Payer: COMMERCIAL

## 2019-02-21 VITALS
SYSTOLIC BLOOD PRESSURE: 104 MMHG | HEART RATE: 84 BPM | HEIGHT: 63 IN | DIASTOLIC BLOOD PRESSURE: 62 MMHG | BODY MASS INDEX: 21.85 KG/M2 | TEMPERATURE: 97.7 F | RESPIRATION RATE: 16 BRPM | OXYGEN SATURATION: 98 % | WEIGHT: 123.3 LBS

## 2019-02-21 DIAGNOSIS — R73.03 PREDIABETES: ICD-10-CM

## 2019-02-21 DIAGNOSIS — K52.9 ACUTE GASTROENTERITIS: Primary | ICD-10-CM

## 2019-02-21 DIAGNOSIS — Z12.11 COLON CANCER SCREENING: ICD-10-CM

## 2019-02-21 DIAGNOSIS — I10 ESSENTIAL HYPERTENSION: ICD-10-CM

## 2019-02-21 DIAGNOSIS — E78.2 MIXED HYPERLIPIDEMIA: ICD-10-CM

## 2019-02-21 DIAGNOSIS — Z12.39 BREAST CANCER SCREENING: ICD-10-CM

## 2019-02-21 PROCEDURE — 99214 OFFICE O/P EST MOD 30 MIN: CPT | Performed by: FAMILY MEDICINE

## 2019-02-21 RX ORDER — ONDANSETRON 4 MG/1
4 TABLET, FILM COATED ORAL EVERY 8 HOURS PRN
Qty: 21 TABLET | Refills: 1 | Status: SHIPPED | OUTPATIENT
Start: 2019-02-21 | End: 2019-02-28

## 2019-02-21 RX ORDER — ONDANSETRON 4 MG/1
4 TABLET, FILM COATED ORAL EVERY 8 HOURS PRN
Qty: 21 TABLET | Refills: 1 | Status: SHIPPED | OUTPATIENT
Start: 2019-02-21 | End: 2019-02-21 | Stop reason: SDUPTHER

## 2019-02-21 ASSESSMENT — ENCOUNTER SYMPTOMS
WHEEZING: 0
APNEA: 0
ABDOMINAL PAIN: 0
NAUSEA: 1
VOMITING: 1
SHORTNESS OF BREATH: 0
RECTAL PAIN: 0
STRIDOR: 0
ABDOMINAL DISTENTION: 0
CHOKING: 0
COUGH: 0
BLOOD IN STOOL: 0
CONSTIPATION: 0
DIARRHEA: 1
ANAL BLEEDING: 0
CHEST TIGHTNESS: 0

## 2019-02-21 ASSESSMENT — PATIENT HEALTH QUESTIONNAIRE - PHQ9
2. FEELING DOWN, DEPRESSED OR HOPELESS: 0
SUM OF ALL RESPONSES TO PHQ9 QUESTIONS 1 & 2: 0
1. LITTLE INTEREST OR PLEASURE IN DOING THINGS: 0
SUM OF ALL RESPONSES TO PHQ QUESTIONS 1-9: 0
SUM OF ALL RESPONSES TO PHQ QUESTIONS 1-9: 0

## 2019-04-29 DIAGNOSIS — I10 ESSENTIAL HYPERTENSION: ICD-10-CM

## 2019-04-29 DIAGNOSIS — E78.2 MIXED HYPERLIPIDEMIA: ICD-10-CM

## 2019-04-29 DIAGNOSIS — R73.03 PREDIABETES: ICD-10-CM

## 2019-04-29 LAB
A/G RATIO: 1.5 (ref 1.1–2.2)
ALBUMIN SERPL-MCNC: 4.4 G/DL (ref 3.4–5)
ALP BLD-CCNC: 95 U/L (ref 40–129)
ALT SERPL-CCNC: 13 U/L (ref 10–40)
ANION GAP SERPL CALCULATED.3IONS-SCNC: 15 MMOL/L (ref 3–16)
AST SERPL-CCNC: 17 U/L (ref 15–37)
BILIRUB SERPL-MCNC: 0.3 MG/DL (ref 0–1)
BUN BLDV-MCNC: 12 MG/DL (ref 7–20)
CALCIUM SERPL-MCNC: 9.6 MG/DL (ref 8.3–10.6)
CHLORIDE BLD-SCNC: 106 MMOL/L (ref 99–110)
CHOLESTEROL, TOTAL: 218 MG/DL (ref 0–199)
CO2: 24 MMOL/L (ref 21–32)
CREAT SERPL-MCNC: 1.1 MG/DL (ref 0.6–1.1)
GFR AFRICAN AMERICAN: >60
GFR NON-AFRICAN AMERICAN: 52
GLOBULIN: 2.9 G/DL
GLUCOSE BLD-MCNC: 102 MG/DL (ref 70–99)
HDLC SERPL-MCNC: 68 MG/DL (ref 40–60)
LDL CHOLESTEROL CALCULATED: 109 MG/DL
POTASSIUM SERPL-SCNC: 4.5 MMOL/L (ref 3.5–5.1)
SODIUM BLD-SCNC: 145 MMOL/L (ref 136–145)
TOTAL PROTEIN: 7.3 G/DL (ref 6.4–8.2)
TRIGL SERPL-MCNC: 206 MG/DL (ref 0–150)
VLDLC SERPL CALC-MCNC: 41 MG/DL

## 2019-04-30 LAB
ESTIMATED AVERAGE GLUCOSE: 108.3 MG/DL
HBA1C MFR BLD: 5.4 %

## 2019-05-21 DIAGNOSIS — E78.2 MIXED HYPERLIPIDEMIA: ICD-10-CM

## 2019-05-21 RX ORDER — ATORVASTATIN CALCIUM 40 MG/1
TABLET, FILM COATED ORAL
Qty: 30 TABLET | Refills: 0 | Status: SHIPPED | OUTPATIENT
Start: 2019-05-21 | End: 2019-05-31 | Stop reason: SINTOL

## 2019-05-29 ENCOUNTER — TELEPHONE (OUTPATIENT)
Dept: FAMILY MEDICINE CLINIC | Age: 54
End: 2019-05-29

## 2019-05-29 NOTE — TELEPHONE ENCOUNTER
[phone]    Collette Bears    PT is on the generic for Lipitor. She says her legs are getting worse. Feet and legs swell every day, causing pain. She says this is one of the side effects of the medication. PT says proteins are showing up in her kidneys. Could this be as a result of her medication?     Last seen 2/21/2019

## 2019-05-31 ENCOUNTER — OFFICE VISIT (OUTPATIENT)
Dept: FAMILY MEDICINE CLINIC | Age: 54
End: 2019-05-31
Payer: COMMERCIAL

## 2019-05-31 VITALS
HEIGHT: 63 IN | HEART RATE: 69 BPM | TEMPERATURE: 98.4 F | DIASTOLIC BLOOD PRESSURE: 82 MMHG | WEIGHT: 124 LBS | OXYGEN SATURATION: 98 % | SYSTOLIC BLOOD PRESSURE: 135 MMHG | BODY MASS INDEX: 21.97 KG/M2 | RESPIRATION RATE: 16 BRPM

## 2019-05-31 DIAGNOSIS — E78.2 MIXED HYPERLIPIDEMIA: ICD-10-CM

## 2019-05-31 DIAGNOSIS — I10 ESSENTIAL HYPERTENSION: ICD-10-CM

## 2019-05-31 DIAGNOSIS — F17.200 TOBACCO USE DISORDER: ICD-10-CM

## 2019-05-31 DIAGNOSIS — R73.03 PREDIABETES: ICD-10-CM

## 2019-05-31 DIAGNOSIS — M79.10 MYALGIA: Primary | ICD-10-CM

## 2019-05-31 DIAGNOSIS — Z12.39 BREAST CANCER SCREENING: ICD-10-CM

## 2019-05-31 PROCEDURE — 99214 OFFICE O/P EST MOD 30 MIN: CPT | Performed by: FAMILY MEDICINE

## 2019-05-31 ASSESSMENT — ENCOUNTER SYMPTOMS
STRIDOR: 0
NAUSEA: 0
ANAL BLEEDING: 0
DIARRHEA: 0
ABDOMINAL PAIN: 0
CHEST TIGHTNESS: 0
CONSTIPATION: 0
BLOOD IN STOOL: 0
COUGH: 0
SHORTNESS OF BREATH: 0
VOMITING: 0
RECTAL PAIN: 0
ABDOMINAL DISTENTION: 0
APNEA: 0
CHOKING: 0
WHEEZING: 0

## 2019-05-31 ASSESSMENT — PATIENT HEALTH QUESTIONNAIRE - PHQ9
SUM OF ALL RESPONSES TO PHQ9 QUESTIONS 1 & 2: 0
SUM OF ALL RESPONSES TO PHQ QUESTIONS 1-9: 0
2. FEELING DOWN, DEPRESSED OR HOPELESS: 0
1. LITTLE INTEREST OR PLEASURE IN DOING THINGS: 0
SUM OF ALL RESPONSES TO PHQ QUESTIONS 1-9: 0

## 2019-05-31 NOTE — PROGRESS NOTES
Subjective:      Patient ID: Hemant Turner is a 48 y.o. female. HPI  Results for Micki Dumont \"TRAM\" (MRN [id number]) as of 5/31/2019 13:43   Ref.  Range 4/29/2019 08:55 4/29/2019 08:58   Sodium Latest Ref Range: 136 - 145 mmol/L 145 146 (H)   Potassium Latest Ref Range: 3.5 - 5.1 mmol/L 4.5 4.4   Chloride Latest Ref Range: 99 - 110 mmol/L 106 107   CO2 Latest Ref Range: 21 - 32 mmol/L 24 24   BUN Latest Ref Range: 7 - 20 mg/dL 12 12   Creatinine Latest Ref Range: 0.6 - 1.1 mg/dL 1.1 1.1   Anion Gap Latest Ref Range: 3 - 16  15 15   GFR Non- Latest Ref Range: >60  52 (A) 52 (A)   GFR  Latest Ref Range: >60  >60 >60   Glucose Latest Ref Range: 70 - 99 mg/dL 102 (H) 102 (H)   Calcium Latest Ref Range: 8.3 - 10.6 mg/dL 9.6 9.7   Phosphorus Latest Ref Range: 2.5 - 4.9 mg/dL  3.9   Total Protein Latest Ref Range: 6.4 - 8.2 g/dL 7.3    Cholesterol, Total Latest Ref Range: 0 - 199 mg/dL 218 (H)    HDL Cholesterol Latest Ref Range: 40 - 60 mg/dL 68 (H)    LDL Calculated Latest Ref Range: <100 mg/dL 109 (H)    Triglycerides Latest Ref Range: 0 - 150 mg/dL 206 (H)    VLDL Cholesterol Calculated Latest Ref Range: Not Established mg/dL 41    Albumin Latest Ref Range: 3.4 - 5.0 g/dL 4.4 4.5   Globulin Latest Units: g/dL 2.9    Albumin/Globulin Ratio Latest Ref Range: 1.1 - 2.2  1.5    Alk Phos Latest Ref Range: 40 - 129 U/L 95    ALT Latest Ref Range: 10 - 40 U/L 13    AST Latest Ref Range: 15 - 37 U/L 17    Bilirubin Latest Ref Range: 0.0 - 1.0 mg/dL 0.3    Hemoglobin A1C Latest Ref Range: See comment % 5.4    eAG (mg/dL) Latest Units: mg/dL 108.3    Color, UA Latest Ref Range: Straw/Yellow   YELLOW   Clarity, UA Latest Ref Range: Clear   Clear   Glucose, UA Latest Ref Range: Negative mg/dL  Negative   Bilirubin, Urine Latest Ref Range: Negative   Negative   Ketones, Urine Latest Ref Range: Negative mg/dL  Negative   Specific Gravity, UA Latest Ref Range: 1.005 - 1.030   1.022   Blood, Urine Latest Ref Range: Negative   Negative   pH, UA Latest Ref Range: 5.0 - 8.0   5.5   Protein, UA Latest Ref Range: Negative mg/dL  Negative   Protein, Ur Latest Ref Range: <12 mg/dL  15.00 (H)   Urobilinogen, Urine Latest Ref Range: <2.0 E.U./dL  0.2   Nitrite, Urine Latest Ref Range: Negative   Negative   Leukocyte Esterase, Urine Latest Ref Range: Negative   Negative   Hyaline Casts, UA Latest Ref Range: 0 - 8 /LPF  4   WBC, UA Latest Ref Range: 0 - 5 /HPF  2   RBC, UA Latest Ref Range: 0 - 4 /HPF  4   Epi Cells Latest Ref Range: 0 - 5 /HPF  2   Microscopic Examination Unknown  Not Indicated   Creatinine, Ur Latest Ref Range: 28.0 - 259.0 mg/dL  190.1     Presenting w/new complaint of muscle pain in lower legs due to lipitor she thinks. Stopped med & sx are improving. Preceding injury:none recalled. Associated w/nothing. Worsened(aggravated)/triggered after taking lipitor. Improving with stopping medication. Denies calf swelling/prolonged bed rest/recent air travel/calf injury/fhx of blood clotting or bleeding disorders/sob/cp/leg mrvnmkow-hxnrieplevk-utjndchbs/leg rash or ulcers/personal hx of DVT or PE/fever/chills/appetite decrease or loss. Abnml GFR:see above:sees nephrologist:Dr. Botello. Associated w/nothing. Worsened(aggravated) by nothing identified. Improves by nothing identified. Protein intake:not excessive. Denies urinary complaints/abdo pain/wfmzpbt-hpkqlozpc-qkftzdd/decreased urinary flow/sensation of incomplete voiding/excessive NSAIDs use. Prediabetes:see labs above. Associated w/nothing. Improving factor:plans to address diet regime. Worsening factor:none reported. No fhx diabetes. Denies polyuria/polyphagia/polydipsia/unexpected weight loss or gain. Hyperlipidemia:doing well. Associated w/nothing new. Improving factors:none reported. Statin allergy:see above. Worsening factors:diet. Denies adbo pain/myalgias. HTN:doing well.   Takes medication w/o side effects. Etoh consumption:none now. Adds salt to food at the table:No.  No other associated or improving factors. Denies cp/sob/pnd/ankle edema/dizziness. Allergies   Allergen Reactions    Nuts [Peanut-Containing Drug Products] Anaphylaxis and Other (See Comments)     Stops breathing      Shellfish-Derived Products Anaphylaxis and Other (See Comments)     Stops breathing      Penicillins Hives    Azithromycin Nausea And Vomiting    Erythromycin Nausea And Vomiting     GI upset       Current Outpatient Medications on File Prior to Visit   Medication Sig Dispense Refill    atorvastatin (LIPITOR) 40 MG tablet TAKE 1 TABLET BY MOUTH IN THE EVENING  30 tablet 0    lisinopril (PRINIVIL;ZESTRIL) 40 MG tablet Take 40 mg by mouth daily      carvedilol (COREG) 12.5 MG tablet Take 12.5 mg by mouth 2 times daily (with meals)      dexamethasone (DECADRON) 1 MG tablet One mg tablet night before blood work, between 11pm to midnight. 1 tablet 0    loratadine (CLARITIN) 10 MG capsule Take 10 mg by mouth daily      Handicap Placard MISC by Does not apply route [id number] 1 each 0    folic acid (FOLVITE) 1 MG tablet Take 1 tablet by mouth daily 30 tablet 3    nicotine (NICODERM CQ) 21 MG/24HR Place 1 patch onto the skin daily 14 patch 0     No current facility-administered medications on file prior to visit.         Past Medical History:   Diagnosis Date    Abdominal pain     Cardiac arrest [facility]) 10/31/2017    Hospitalized [facility] hosp:cardiologist:Dr. Frank Wolfe    Eczema     Epigastric pain     Gastric ulcer     Hyperaldosteronism ([facility] [address] 12/26/2017    under care of endo    Hypertension     per nephro-HTN center:Dr. Botello    Mixed hyperlipidemia 12/15/2017    Prediabetes     Tobacco use disorder            Social History     Tobacco Use    Smoking status: Current Every Day Smoker     Packs/day: 1.00     Years: 20.00     Pack years: 20.00     Types: Cigarettes    Smokeless tobacco: are 2+ on the right side, and 2+ on the left side. Posterior tibial pulses are 2+ on the right side, and 2+ on the left side. No leg-ankle edema. Negative Dayron's sign bilaterally. No signs/sx/exam findings concerning for DVT/infection/compartment syndrome. Pulmonary/Chest: Effort normal and breath sounds normal.   CTAB,good AE bilaterally   Abdominal: Soft. Normal appearance and bowel sounds are normal. She exhibits no distension and no mass. There is no hepatomegaly. There is no tenderness. Musculoskeletal:        Right hip: Normal.        Left hip: Normal.        Right knee: Normal.        Left knee: Normal.        Right ankle: Normal. Achilles tendon normal.        Left ankle: Normal. Achilles tendon normal.        Right upper leg: Normal.        Left upper leg: Normal.        Right lower leg: Normal.        Left lower leg: Normal.        Right foot: Normal.        Left foot: Normal.   Lymphadenopathy:     She has no cervical adenopathy. Neurological: She is alert and oriented to person, place, and time. She has normal reflexes. Skin: Skin is warm, dry and intact. Capillary refill takes less than 2 seconds. No rash noted. She is not diaphoretic. No cyanosis. No pallor. Good skin turgor. Psychiatric: She has a normal mood and affect. Assessment:       Diagnosis Orders   1. Myalgia:bilateral legs VSS/well appearing. Improving since stopping med. Check labs. Med added to allergy list.  Check ABPI due to risk factors. CK    CBC    Comprehensive Metabolic Panel   2. Essential hypertension  Stable. Comprehensive Metabolic Panel   3. Mixed hyperlipidemia  Not at goal.  Lipitor stopped approx 2days go. Labs in 5-6weeks. Comprehensive Metabolic Panel    Lipid Panel   4. Prediabetes  Stable. 5. Breast cancer screening  Pt' has mammogram order & will schedule soon.          6. Tobacco use disorder  VL LOWER EXTREMITY ARTERIAL SEGMENTAL PRESSURES W PPG BILATERAL           Plan:

## 2019-09-10 DIAGNOSIS — E78.2 MIXED HYPERLIPIDEMIA: ICD-10-CM

## 2019-09-10 DIAGNOSIS — M79.10 MYALGIA: ICD-10-CM

## 2019-09-10 DIAGNOSIS — I10 ESSENTIAL HYPERTENSION: ICD-10-CM

## 2019-09-10 LAB
A/G RATIO: 1.8 (ref 1.1–2.2)
ALBUMIN SERPL-MCNC: 4.4 G/DL (ref 3.4–5)
ALP BLD-CCNC: 82 U/L (ref 40–129)
ALT SERPL-CCNC: 6 U/L (ref 10–40)
ANION GAP SERPL CALCULATED.3IONS-SCNC: 13 MMOL/L (ref 3–16)
AST SERPL-CCNC: 14 U/L (ref 15–37)
BILIRUB SERPL-MCNC: <0.2 MG/DL (ref 0–1)
BUN BLDV-MCNC: 13 MG/DL (ref 7–20)
CALCIUM SERPL-MCNC: 9.3 MG/DL (ref 8.3–10.6)
CHLORIDE BLD-SCNC: 105 MMOL/L (ref 99–110)
CHOLESTEROL, TOTAL: 280 MG/DL (ref 0–199)
CO2: 23 MMOL/L (ref 21–32)
CREAT SERPL-MCNC: 0.9 MG/DL (ref 0.6–1.1)
GFR AFRICAN AMERICAN: >60
GFR NON-AFRICAN AMERICAN: >60
GLOBULIN: 2.5 G/DL
GLUCOSE BLD-MCNC: 98 MG/DL (ref 70–99)
HCT VFR BLD CALC: 40.2 % (ref 36–48)
HDLC SERPL-MCNC: 68 MG/DL (ref 40–60)
HEMOGLOBIN: 13.8 G/DL (ref 12–16)
LDL CHOLESTEROL CALCULATED: 181 MG/DL
MCH RBC QN AUTO: 31.6 PG (ref 26–34)
MCHC RBC AUTO-ENTMCNC: 34.2 G/DL (ref 31–36)
MCV RBC AUTO: 92.4 FL (ref 80–100)
PDW BLD-RTO: 13.7 % (ref 12.4–15.4)
PLATELET # BLD: 202 K/UL (ref 135–450)
PMV BLD AUTO: 8.8 FL (ref 5–10.5)
POTASSIUM SERPL-SCNC: 4.9 MMOL/L (ref 3.5–5.1)
RBC # BLD: 4.36 M/UL (ref 4–5.2)
SODIUM BLD-SCNC: 141 MMOL/L (ref 136–145)
TOTAL CK: 36 U/L (ref 26–192)
TOTAL PROTEIN: 6.9 G/DL (ref 6.4–8.2)
TRIGL SERPL-MCNC: 156 MG/DL (ref 0–150)
VLDLC SERPL CALC-MCNC: 31 MG/DL
WBC # BLD: 8.4 K/UL (ref 4–11)

## 2019-10-02 ENCOUNTER — OFFICE VISIT (OUTPATIENT)
Dept: FAMILY MEDICINE CLINIC | Age: 54
End: 2019-10-02
Payer: COMMERCIAL

## 2019-10-02 ENCOUNTER — TELEPHONE (OUTPATIENT)
Dept: FAMILY MEDICINE CLINIC | Age: 54
End: 2019-10-02

## 2019-10-02 VITALS
HEART RATE: 80 BPM | BODY MASS INDEX: 22.27 KG/M2 | DIASTOLIC BLOOD PRESSURE: 66 MMHG | SYSTOLIC BLOOD PRESSURE: 122 MMHG | TEMPERATURE: 98.3 F | HEIGHT: 63 IN | RESPIRATION RATE: 16 BRPM | WEIGHT: 125.7 LBS | OXYGEN SATURATION: 98 %

## 2019-10-02 DIAGNOSIS — F17.200 TOBACCO USE DISORDER: ICD-10-CM

## 2019-10-02 DIAGNOSIS — I10 ESSENTIAL HYPERTENSION: Primary | ICD-10-CM

## 2019-10-02 DIAGNOSIS — E87.6 HYPOKALEMIA: ICD-10-CM

## 2019-10-02 DIAGNOSIS — M79.10 MYALGIA: ICD-10-CM

## 2019-10-02 DIAGNOSIS — E78.2 MIXED HYPERLIPIDEMIA: ICD-10-CM

## 2019-10-02 DIAGNOSIS — Z12.11 COLON CANCER SCREENING: ICD-10-CM

## 2019-10-02 DIAGNOSIS — R73.03 PREDIABETES: ICD-10-CM

## 2019-10-02 LAB — POTASSIUM SERPL-SCNC: 4.5 MMOL/L (ref 3.5–5.1)

## 2019-10-02 PROCEDURE — 99214 OFFICE O/P EST MOD 30 MIN: CPT | Performed by: FAMILY MEDICINE

## 2019-10-02 RX ORDER — EZETIMIBE 10 MG/1
10 TABLET ORAL DAILY
Qty: 30 TABLET | Refills: 2 | Status: SHIPPED | OUTPATIENT
Start: 2019-10-02 | End: 2020-01-03

## 2019-10-02 RX ORDER — LORATADINE 10 MG/1
10 TABLET ORAL DAILY
COMMUNITY
End: 2021-09-13 | Stop reason: SDUPTHER

## 2019-10-02 RX ORDER — POTASSIUM CHLORIDE 20 MEQ/1
10 TABLET, EXTENDED RELEASE ORAL DAILY
COMMUNITY

## 2019-10-02 ASSESSMENT — ENCOUNTER SYMPTOMS
RECTAL PAIN: 0
DIARRHEA: 0
BLOOD IN STOOL: 0
CHOKING: 0
STRIDOR: 0
APNEA: 0
SHORTNESS OF BREATH: 0
ABDOMINAL PAIN: 0
WHEEZING: 0
CONSTIPATION: 0
NAUSEA: 0
CHEST TIGHTNESS: 0
COUGH: 0
ABDOMINAL DISTENTION: 0
ANAL BLEEDING: 0
VOMITING: 0

## 2019-10-17 ENCOUNTER — OFFICE VISIT (OUTPATIENT)
Dept: ENDOCRINOLOGY | Age: 54
End: 2019-10-17
Payer: COMMERCIAL

## 2019-10-17 ENCOUNTER — TELEPHONE (OUTPATIENT)
Dept: ENDOCRINOLOGY | Age: 54
End: 2019-10-17

## 2019-10-17 VITALS
BODY MASS INDEX: 22.64 KG/M2 | SYSTOLIC BLOOD PRESSURE: 136 MMHG | HEIGHT: 63 IN | DIASTOLIC BLOOD PRESSURE: 70 MMHG | HEART RATE: 74 BPM | WEIGHT: 127.8 LBS | OXYGEN SATURATION: 99 %

## 2019-10-17 DIAGNOSIS — E27.8 ADRENAL NODULE (HCC): Primary | ICD-10-CM

## 2019-10-17 DIAGNOSIS — E55.9 VITAMIN D DEFICIENCY: ICD-10-CM

## 2019-10-17 DIAGNOSIS — F17.200 SMOKING: ICD-10-CM

## 2019-10-17 DIAGNOSIS — R25.2 MUSCLE CRAMPS: ICD-10-CM

## 2019-10-17 DIAGNOSIS — E27.8 ADRENAL NODULE (HCC): ICD-10-CM

## 2019-10-17 PROCEDURE — 99214 OFFICE O/P EST MOD 30 MIN: CPT | Performed by: INTERNAL MEDICINE

## 2019-10-17 RX ORDER — DEXAMETHASONE 1 MG
TABLET ORAL
Qty: 120 TABLET | Refills: 11 | Status: SHIPPED | OUTPATIENT
Start: 2019-10-17 | End: 2019-10-17 | Stop reason: SDUPTHER

## 2019-10-17 RX ORDER — DEXAMETHASONE 1 MG
TABLET ORAL
Qty: 1 TABLET | Refills: 0 | Status: SHIPPED | OUTPATIENT
Start: 2019-10-17 | End: 2019-12-23

## 2019-10-22 ENCOUNTER — TELEPHONE (OUTPATIENT)
Dept: ENDOCRINOLOGY | Age: 54
End: 2019-10-22

## 2019-10-22 DIAGNOSIS — E27.8 ADRENAL NODULE (HCC): ICD-10-CM

## 2019-10-22 DIAGNOSIS — E55.9 VITAMIN D DEFICIENCY: ICD-10-CM

## 2019-10-22 DIAGNOSIS — R25.2 MUSCLE CRAMPS: ICD-10-CM

## 2019-10-22 LAB
CORTISOL - AM: 1.7 UG/DL (ref 4.3–22.4)
T4 FREE: 1.2 NG/DL (ref 0.9–1.8)
TSH SERPL DL<=0.05 MIU/L-ACNC: 1.35 UIU/ML (ref 0.27–4.2)
VITAMIN D 25-HYDROXY: 12.4 NG/ML

## 2019-10-23 ENCOUNTER — HOSPITAL ENCOUNTER (OUTPATIENT)
Dept: MRI IMAGING | Age: 54
Discharge: HOME | End: 2019-10-23
Payer: COMMERCIAL

## 2019-10-23 DIAGNOSIS — E55.9 VITAMIN D DEFICIENCY: ICD-10-CM

## 2019-10-23 DIAGNOSIS — R25.2 MUSCLE CRAMPS: ICD-10-CM

## 2019-10-23 DIAGNOSIS — E27.8 ADRENAL NODULE (HCC): ICD-10-CM

## 2019-10-23 PROCEDURE — 74183 MRI ABD W/O CNTR FLWD CNTR: CPT

## 2019-10-23 PROCEDURE — A9579 GAD-BASE MR CONTRAST NOS,1ML: HCPCS | Performed by: INTERNAL MEDICINE

## 2019-10-23 PROCEDURE — 6360000004 HC RX CONTRAST MEDICATION: Performed by: INTERNAL MEDICINE

## 2019-10-23 RX ADMIN — GADOTERIDOL 11 ML: 279.3 INJECTION, SOLUTION INTRAVENOUS at 10:50

## 2019-10-28 ENCOUNTER — TELEPHONE (OUTPATIENT)
Dept: ENDOCRINOLOGY | Age: 54
End: 2019-10-28

## 2019-10-28 DIAGNOSIS — E55.9 VITAMIN D DEFICIENCY: Primary | ICD-10-CM

## 2019-10-28 RX ORDER — CHOLECALCIFEROL (VITAMIN D3) 1250 MCG
1 CAPSULE ORAL WEEKLY
Qty: 4 CAPSULE | Refills: 0 | Status: SHIPPED | OUTPATIENT
Start: 2019-10-28 | End: 2019-11-07 | Stop reason: SDUPTHER

## 2019-11-07 DIAGNOSIS — E55.9 VITAMIN D DEFICIENCY: ICD-10-CM

## 2019-11-07 RX ORDER — CHOLECALCIFEROL (VITAMIN D3) 1250 MCG
1 CAPSULE ORAL WEEKLY
Qty: 4 CAPSULE | Refills: 3 | Status: SHIPPED | OUTPATIENT
Start: 2019-11-07 | End: 2019-11-26 | Stop reason: SDUPTHER

## 2019-11-25 DIAGNOSIS — E55.9 VITAMIN D DEFICIENCY: ICD-10-CM

## 2019-11-26 RX ORDER — CHOLECALCIFEROL (VITAMIN D3) 1250 MCG
1 CAPSULE ORAL WEEKLY
Qty: 4 CAPSULE | Refills: 3 | Status: SHIPPED | OUTPATIENT
Start: 2019-11-26 | End: 2020-02-11 | Stop reason: SDUPTHER

## 2019-12-04 DIAGNOSIS — E78.2 MIXED HYPERLIPIDEMIA: ICD-10-CM

## 2019-12-04 DIAGNOSIS — R73.03 PREDIABETES: ICD-10-CM

## 2019-12-04 DIAGNOSIS — I10 ESSENTIAL HYPERTENSION: ICD-10-CM

## 2019-12-04 LAB
A/G RATIO: 1.5 (ref 1.1–2.2)
ALBUMIN SERPL-MCNC: 4.3 G/DL (ref 3.4–5)
ALP BLD-CCNC: 87 U/L (ref 40–129)
ALT SERPL-CCNC: 8 U/L (ref 10–40)
ANION GAP SERPL CALCULATED.3IONS-SCNC: 15 MMOL/L (ref 3–16)
AST SERPL-CCNC: 15 U/L (ref 15–37)
BILIRUB SERPL-MCNC: 0.3 MG/DL (ref 0–1)
BUN BLDV-MCNC: 13 MG/DL (ref 7–20)
CALCIUM SERPL-MCNC: 9.8 MG/DL (ref 8.3–10.6)
CHLORIDE BLD-SCNC: 101 MMOL/L (ref 99–110)
CHOLESTEROL, TOTAL: 284 MG/DL (ref 0–199)
CO2: 23 MMOL/L (ref 21–32)
CREAT SERPL-MCNC: 0.9 MG/DL (ref 0.6–1.1)
GFR AFRICAN AMERICAN: >60
GFR NON-AFRICAN AMERICAN: >60
GLOBULIN: 2.9 G/DL
GLUCOSE BLD-MCNC: 95 MG/DL (ref 70–99)
HDLC SERPL-MCNC: 80 MG/DL (ref 40–60)
LDL CHOLESTEROL CALCULATED: 163 MG/DL
POTASSIUM SERPL-SCNC: 4.7 MMOL/L (ref 3.5–5.1)
SODIUM BLD-SCNC: 139 MMOL/L (ref 136–145)
TOTAL PROTEIN: 7.2 G/DL (ref 6.4–8.2)
TRIGL SERPL-MCNC: 207 MG/DL (ref 0–150)
VLDLC SERPL CALC-MCNC: 41 MG/DL

## 2019-12-04 PROCEDURE — 36415 COLL VENOUS BLD VENIPUNCTURE: CPT | Performed by: FAMILY MEDICINE

## 2019-12-05 LAB
ESTIMATED AVERAGE GLUCOSE: 111.2 MG/DL
HBA1C MFR BLD: 5.5 %

## 2019-12-23 ENCOUNTER — OFFICE VISIT (OUTPATIENT)
Dept: FAMILY MEDICINE CLINIC | Age: 54
End: 2019-12-23
Payer: COMMERCIAL

## 2019-12-23 VITALS
HEART RATE: 80 BPM | OXYGEN SATURATION: 99 % | SYSTOLIC BLOOD PRESSURE: 132 MMHG | HEIGHT: 63 IN | RESPIRATION RATE: 16 BRPM | TEMPERATURE: 97.7 F | BODY MASS INDEX: 22.5 KG/M2 | DIASTOLIC BLOOD PRESSURE: 74 MMHG | WEIGHT: 127 LBS

## 2019-12-23 DIAGNOSIS — R73.03 PREDIABETES: ICD-10-CM

## 2019-12-23 DIAGNOSIS — F17.200 TOBACCO USE DISORDER: ICD-10-CM

## 2019-12-23 DIAGNOSIS — E87.6 HYPOKALEMIA: ICD-10-CM

## 2019-12-23 DIAGNOSIS — J01.00 ACUTE NON-RECURRENT MAXILLARY SINUSITIS: Primary | ICD-10-CM

## 2019-12-23 DIAGNOSIS — I10 ESSENTIAL HYPERTENSION: ICD-10-CM

## 2019-12-23 DIAGNOSIS — E78.2 MIXED HYPERLIPIDEMIA: ICD-10-CM

## 2019-12-23 PROCEDURE — 99214 OFFICE O/P EST MOD 30 MIN: CPT | Performed by: FAMILY MEDICINE

## 2019-12-23 RX ORDER — EZETIMIBE 10 MG/1
10 TABLET ORAL DAILY
Qty: 30 TABLET | Refills: 2 | Status: CANCELLED | OUTPATIENT
Start: 2019-12-23

## 2019-12-23 RX ORDER — SULFAMETHOXAZOLE AND TRIMETHOPRIM 800; 160 MG/1; MG/1
1 TABLET ORAL 2 TIMES DAILY
Qty: 20 TABLET | Refills: 0 | Status: SHIPPED | OUTPATIENT
Start: 2019-12-23 | End: 2020-01-02

## 2019-12-23 ASSESSMENT — ENCOUNTER SYMPTOMS
VOMITING: 0
EYE ITCHING: 0
COUGH: 1
RHINORRHEA: 1
EYE PAIN: 0
STRIDOR: 0
CONSTIPATION: 0
EYE DISCHARGE: 0
BLOOD IN STOOL: 0
DIARRHEA: 0
ABDOMINAL PAIN: 0
EYE REDNESS: 0
FACIAL SWELLING: 0
SORE THROAT: 0
RECTAL PAIN: 0
SINUS PAIN: 1
SINUS PRESSURE: 1
CHOKING: 0
TROUBLE SWALLOWING: 0
NAUSEA: 0
PHOTOPHOBIA: 0
ANAL BLEEDING: 0
ABDOMINAL DISTENTION: 0
CHEST TIGHTNESS: 0
VOICE CHANGE: 0
APNEA: 0
WHEEZING: 0
SHORTNESS OF BREATH: 0

## 2020-01-03 RX ORDER — EZETIMIBE 10 MG/1
TABLET ORAL
Qty: 30 TABLET | Refills: 1 | Status: SHIPPED | OUTPATIENT
Start: 2020-01-03 | End: 2020-03-05

## 2020-01-13 ENCOUNTER — TELEPHONE (OUTPATIENT)
Dept: FAMILY MEDICINE CLINIC | Age: 55
End: 2020-01-13

## 2020-01-29 ENCOUNTER — TELEPHONE (OUTPATIENT)
Dept: FAMILY MEDICINE CLINIC | Age: 55
End: 2020-01-29

## 2020-02-05 ENCOUNTER — TELEPHONE (OUTPATIENT)
Dept: ENDOCRINOLOGY | Age: 55
End: 2020-02-05

## 2020-02-11 ENCOUNTER — OFFICE VISIT (OUTPATIENT)
Dept: ENDOCRINOLOGY | Age: 55
End: 2020-02-11
Payer: COMMERCIAL

## 2020-02-11 VITALS
DIASTOLIC BLOOD PRESSURE: 78 MMHG | HEIGHT: 63 IN | HEART RATE: 72 BPM | BODY MASS INDEX: 22.79 KG/M2 | SYSTOLIC BLOOD PRESSURE: 165 MMHG | OXYGEN SATURATION: 97 % | WEIGHT: 128.6 LBS

## 2020-02-11 PROCEDURE — 99214 OFFICE O/P EST MOD 30 MIN: CPT | Performed by: INTERNAL MEDICINE

## 2020-02-11 RX ORDER — CHOLECALCIFEROL (VITAMIN D3) 1250 MCG
1 CAPSULE ORAL WEEKLY
Qty: 4 CAPSULE | Refills: 3 | Status: SHIPPED | OUTPATIENT
Start: 2020-02-11 | End: 2020-07-06

## 2020-02-11 NOTE — PROGRESS NOTES
Patient ID:   Myriam Aiken is a 47 y.o. female    Chief Complaint:   Myriam Aiken is seen for an evaluation of bilateral adrenal nodules, hyperaldosteronism. Subjective:   Myriam Aiken was admitted after cardiac arrest in Oct 2017. K was 1.9 and QT prolongation. LHC showed EF 25%. Nov 2017, Echo showed EF 60%. In July 2016 she had an admission for hypokalemia and hypertensive urgency. Renin and orville were suppressed. She did not follow after the ER visit. In Oct 2017 hospitalisations following collections were done. Nov 2nd 2017: Renin 0.1, Orville 4.2 with normal K. Nov 8th 2017: Renin 0.1, Orville 4.1, normal K. Nov 11th 2017: Renin 0.1, Orville 3.2, normal K.     CT Abd/Pelvis e contrast July 2016 showed bilateral adenomatous adrenal glands. Images reviewed, HU units in 40's with contrast.   CT Abd/Pelvis e contrast Nov 2017 showed left sided 1.2cms and right sided 1.5cms nodules. BP running well on ACE inhibitors, carvedilol   K normal on lisinopril     Occasionally anxiety and lightheaded, somewhat better    Having muscle cramps     No kids, postmenopause   Family history of high blood pressure: Not aware. Dad had sudden cardiac death at age 80.    OTC meds: Denies   Denies illicit drugs       The following portions of the patient's history were reviewed and updated as appropriate:      Family History   Problem Relation Age of Onset    Heart Disease Mother     Allergy (Severe) Mother     Arthritis Mother     Coronary Art Dis Mother     High Cholesterol Mother     High Blood Pressure Mother     Mental Illness Mother     Obesity Mother     Heart Disease Father     Alcohol Abuse Father     Coronary Art Dis Father     Learning Disabilities Father     Anemia Sister     Asthma Sister     Cancer Sister     Early Death Sister    Equilla Cesario Hearing Loss Sister     Mental Illness Sister     Obesity Sister     Osteoporosis Sister     Asthma Brother          Social History     Socioeconomic History  Marital status:      Spouse name: Henna Issa Number of children: Not on file    Years of education: Not on file    Highest education level: Not on file   Occupational History    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   Social Needs    Financial resource strain: Not on file    Food insecurity:     Worry: Not on file     Inability: Not on file    Transportation needs:     Medical: Not on file     Non-medical: Not on file   Tobacco Use    Smoking status: Current Every Day Smoker     Packs/day: 1.00     Years: 20.00     Pack years: 20.00     Types: Cigarettes    Smokeless tobacco: Never Used   Substance and Sexual Activity    Alcohol use:  Yes    Drug use: No    Sexual activity: Yes     Partners: Male     Comment: -Eric: No children    Lifestyle    Physical activity:     Days per week: Not on file     Minutes per session: Not on file    Stress: Not on file   Relationships    Social connections:     Talks on phone: Not on file     Gets together: Not on file     Attends [employer]ism service: Not on file     Active member of club or organization: Not on file     Attends meetings of clubs or organizations: Not on file     Relationship status: Not on file    Intimate partner violence:     Fear of current or ex partner: Not on file     Emotionally abused: Not on file     Physically abused: Not on file     Forced sexual activity: Not on file   Other Topics Concern    Not on file   Social History Narrative    Not on file         Past Medical History:   Diagnosis Date    Abdominal pain     Cardiac arrest [facility]) 10/31/2017    Hospitalized [facility] hosp:cardiologist:Dr. Dillan Mcgarry    Eczema     Epigastric pain     Gastric ulcer     Hyperaldosteronism ([facility] [address] 12/26/2017    under care of endo    Hypertension     per nephro-HTN center:Dr. Sofie Baptiste    Mixed hyperlipidemia 12/15/2017    Prediabetes     Tobacco use disorder          Past Surgical History:   Procedure Laterality Date    CHOLECYSTECTOMY, LAPAROSCOPIC  10/10/2016    LAPAROSCOPIC CHOLECYSTECTOMY WITH CHOLANGIOGRAM    KNEE SURGERY      left torn meniscus repair    MANDIBLE SURGERY      SHOULDER SURGERY      SINUS SURGERY      TONSILLECTOMY      TUBAL LIGATION      UPPER GASTROINTESTINAL ENDOSCOPY  07/21/2016    WISDOM TOOTH EXTRACTION           Allergies   Allergen Reactions    Nuts [Peanut-Containing Drug Products] Anaphylaxis and Other (See Comments)     Stops breathing      Shellfish-Derived Products Anaphylaxis and Other (See Comments)     Stops breathing      Penicillins Hives    Lipitor [Atorvastatin Calcium] Other (See Comments)     Leg muscle pain/myalgias    Azithromycin Nausea And Vomiting    Erythromycin Nausea And Vomiting     GI upset         Current Outpatient Medications:     ezetimibe (ZETIA) 10 MG tablet, TAKE 1 TABLET BY MOUTH ONE TIME A DAY , Disp: 30 tablet, Rfl: 1    Cholecalciferol (VITAMIN D3) 1.25 MG (54377 UT) CAPS, Take 1 capsule by mouth once a week, Disp: 4 capsule, Rfl: 3    potassium chloride (KLOR-CON M) 20 MEQ extended release tablet, Take 10 mEq by mouth daily, Disp: , Rfl:     carvedilol (COREG) 12.5 MG tablet, Take 12.5 mg by mouth 2 times daily (with meals), Disp: , Rfl:     Handicap Placard MISC, by Does not apply route Duration:5years, Disp: 1 each, Rfl: 0    folic acid (FOLVITE) 1 MG tablet, Take 1 tablet by mouth daily, Disp: 30 tablet, Rfl: 3    loratadine (CLARITIN) 10 MG tablet, Take 10 mg by mouth daily, Disp: , Rfl:     Review of Systems:    Constitutional: Negative for fever, chills, and unexpected weight change. HENT: Negative for congestion, ear pain, rhinorrhea,  sore throat and trouble swallowing. Eyes: Negative for photophobia, redness, itching. Respiratory: Negative for cough, shortness of breath and sputum. Cardiovascular: Negative for chest pain, palpitations and leg swelling.    Gastrointestinal: Negative for nausea, vomiting, abdominal pain, diarrhea, constipation. Endocrine: Negative for cold intolerance, heat intolerance, polydipsia, polyphagia and polyuria. Genitourinary: Negative for dysuria, urgency, frequency, hematuria and flank pain. Musculoskeletal: Negative for myalgias, back pain, arthralgias and neck pain. Skin/Nail: Negative for rash, itching. Normal nails. Neurological: Negative for seizures, weakness, light-headedness, numbness and headaches. Hematological/ Lymph nodes: Negative for adenopathy. Does not bruise/bleed easily. Psychiatric/Behavioral: Negative for suicidal ideas, depression, anxiety, sleep disturbance and decreased concentration. Objective:   Physical Exam:    BP (!) 149/86 (Site: Right Upper Arm, Position: Sitting, Cuff Size: Medium Adult)   Pulse 72   Ht 5' 3\" (1.6 m)   Wt 128 lb 9.6 oz (58.3 kg)   LMP 08/19/2013 (LMP Unknown)   SpO2 97%   BMI 22.78 kg/m²       Constitutional: Patient is oriented to person, place, and time. Patient appears well-developed and well-nourished. HENT:    Head: Normocephalic and atraumatic. Eyes: Conjunctivae and EOM are normal. Pupils are equal, round, and reactive to light. Neck: Normal range of motion. Cardiovascular: Normal rate, regular rhythm and normal heart sounds. Pulmonary/Chest: Effort normal and breath sounds normal.   Abdominal: Soft. Bowel sounds are normal.   Musculoskeletal: Normal range of motion. Neurological: Patient is alert and oriented to person, place, and time. Patient has normal reflexes. Skin: Skin is warm and dry. Psychiatric: Patient has a normal mood and affect.  Patient behavior is normal.     Lab Review:      Orders Only on 12/04/2019   Component Date Value Ref Range Status    Hemoglobin A1C 12/04/2019 5.5  See comment % Final    eAG 12/04/2019 111.2  mg/dL Final    Cholesterol, Total 12/04/2019 284* 0 - 199 mg/dL Final    Triglycerides 12/04/2019 207* 0 - 150 mg/dL Final    HDL 12/04/2019 80* 40 - 60 mg/dL Final    LDL >60  >60 Final    GFR  09/20/2019 >60  >60 Final    Calcium 09/20/2019 9.5  8.3 - 10.6 mg/dL Final   Orders Only on 09/10/2019   Component Date Value Ref Range Status    Sodium 09/10/2019 140  136 - 145 mmol/L Final    Potassium 09/10/2019 5.3* 3.5 - 5.1 mmol/L Final    Chloride 09/10/2019 104  99 - 110 mmol/L Final    CO2 09/10/2019 23  21 - 32 mmol/L Final    Anion Gap 09/10/2019 13  3 - 16 Final    Glucose 09/10/2019 95  70 - 99 mg/dL Final    BUN 09/10/2019 13  7 - 20 mg/dL Final    CREATININE 09/10/2019 0.9  0.6 - 1.1 mg/dL Final    GFR Non- 09/10/2019 >60  >60 Final    GFR  09/10/2019 >60  >60 Final    Calcium 09/10/2019 9.4  8.3 - 10.6 mg/dL Final    Phosphorus 09/10/2019 3.5  2.5 - 4.9 mg/dL Final    Alb 09/10/2019 4.4  3.4 - 5.0 g/dL Final   Orders Only on 09/10/2019   Component Date Value Ref Range Status    Creatinine, Ur 09/10/2019 136.2  28.0 - 259.0 mg/dL Final   Orders Only on 09/10/2019   Component Date Value Ref Range Status    Protein, Ur 09/10/2019 15.00* <12 mg/dL Final   Orders Only on 09/10/2019   Component Date Value Ref Range Status    Color, UA 09/10/2019 YELLOW  Straw/Yellow Final    Clarity, UA 09/10/2019 Clear  Clear Final    Glucose, Ur 09/10/2019 Negative  Negative mg/dL Final    Bilirubin Urine 09/10/2019 Negative  Negative Final    Ketones, Urine 09/10/2019 Negative  Negative mg/dL Final    Specific Mentcle, UA 09/10/2019 1.019  1.005 - 1.030 Final    Blood, Urine 09/10/2019 Negative  Negative Final    pH, UA 09/10/2019 5.5  5.0 - 8.0 Final    Protein, UA 09/10/2019 Negative  Negative mg/dL Final    Urobilinogen, Urine 09/10/2019 0.2  <2.0 E.U./dL Final    Nitrite, Urine 09/10/2019 Negative  Negative Final    Leukocyte Esterase, Urine 09/10/2019 TRACE* Negative Final    Microscopic Examination 09/10/2019 YES   Final    Hyaline Casts, UA 09/10/2019 3  0 - 8 /LPF Final    WBC, UA 09/10/2019 3  0 - 5 /HPF Electronically signed by Jose G Guerrero MD on 2/11/2020 at 2:50 PM

## 2020-02-13 DIAGNOSIS — E27.8 ADRENAL NODULE (HCC): ICD-10-CM

## 2020-02-13 DIAGNOSIS — E55.9 VITAMIN D DEFICIENCY: ICD-10-CM

## 2020-02-13 LAB — VITAMIN D 25-HYDROXY: 62.2 NG/ML

## 2020-02-17 ENCOUNTER — OFFICE VISIT (OUTPATIENT)
Dept: FAMILY MEDICINE CLINIC | Age: 55
End: 2020-02-17
Payer: COMMERCIAL

## 2020-02-17 ENCOUNTER — HOSPITAL ENCOUNTER (OUTPATIENT)
Dept: CT IMAGING | Age: 55
Discharge: HOME | End: 2020-02-17
Payer: COMMERCIAL

## 2020-02-17 VITALS
RESPIRATION RATE: 16 BRPM | SYSTOLIC BLOOD PRESSURE: 114 MMHG | TEMPERATURE: 98 F | HEART RATE: 87 BPM | OXYGEN SATURATION: 98 % | HEIGHT: 63 IN | BODY MASS INDEX: 22.59 KG/M2 | WEIGHT: 127.5 LBS | DIASTOLIC BLOOD PRESSURE: 82 MMHG

## 2020-02-17 LAB
A/G RATIO: 1.5 (ref 1.1–2.2)
ALBUMIN SERPL-MCNC: 4.4 G/DL (ref 3.4–5)
ALP BLD-CCNC: 92 U/L (ref 40–129)
ALT SERPL-CCNC: 10 U/L (ref 10–40)
ANION GAP SERPL CALCULATED.3IONS-SCNC: 16 MMOL/L (ref 3–16)
AST SERPL-CCNC: 15 U/L (ref 15–37)
BACTERIA URINE, POC: NORMAL
BILIRUB SERPL-MCNC: <0.2 MG/DL (ref 0–1)
BILIRUBIN URINE: 0 MG/DL
BLOOD, URINE: POSITIVE
BUN BLDV-MCNC: 10 MG/DL (ref 7–20)
CALCIUM SERPL-MCNC: 10.1 MG/DL (ref 8.3–10.6)
CASTS URINE, POC: NORMAL
CHLORIDE BLD-SCNC: 100 MMOL/L (ref 99–110)
CLARITY: CLEAR
CO2: 24 MMOL/L (ref 21–32)
COLOR: YELLOW
CREAT SERPL-MCNC: 0.8 MG/DL (ref 0.6–1.1)
CRYSTALS URINE, POC: NORMAL
EPI CELLS URINE, POC: NORMAL
GFR AFRICAN AMERICAN: >60
GFR NON-AFRICAN AMERICAN: >60
GLOBULIN: 2.9 G/DL
GLUCOSE BLD-MCNC: 107 MG/DL (ref 70–99)
GLUCOSE URINE: NORMAL
HCT VFR BLD CALC: 45 % (ref 36–48)
HEMOGLOBIN: 14.6 G/DL (ref 12–16)
KETONES, URINE: NEGATIVE
LEUKOCYTE EST, POC: NEGATIVE
MCH RBC QN AUTO: 30.2 PG (ref 26–34)
MCHC RBC AUTO-ENTMCNC: 32.5 G/DL (ref 31–36)
MCV RBC AUTO: 93 FL (ref 80–100)
METANEPH/PLASMA INTERP: NORMAL
METANEPHRINE FREE PLASMA: 0.13 NMOL/L (ref 0–0.49)
NITRITE, URINE: NEGATIVE
NORMETANEPHRINE FREE PLASMA: 0.57 NMOL/L (ref 0–0.89)
PDW BLD-RTO: 13.7 % (ref 12.4–15.4)
PH UA: 5 (ref 4.5–8)
PLATELET # BLD: 216 K/UL (ref 135–450)
PMV BLD AUTO: 9 FL (ref 5–10.5)
POTASSIUM SERPL-SCNC: 3.9 MMOL/L (ref 3.5–5.1)
PROTEIN UA: NEGATIVE
RBC # BLD: 4.84 M/UL (ref 4–5.2)
RBC URINE, POC: NORMAL
SODIUM BLD-SCNC: 140 MMOL/L (ref 136–145)
SPECIFIC GRAVITY UA: 1.02 (ref 1–1.03)
TOTAL PROTEIN: 7.3 G/DL (ref 6.4–8.2)
UROBILINOGEN, URINE: NORMAL
WBC # BLD: 9.6 K/UL (ref 4–11)
WBC URINE, POC: NORMAL
YEAST URINE, POC: NORMAL

## 2020-02-17 PROCEDURE — 36415 COLL VENOUS BLD VENIPUNCTURE: CPT | Performed by: FAMILY MEDICINE

## 2020-02-17 PROCEDURE — 74176 CT ABD & PELVIS W/O CONTRAST: CPT

## 2020-02-17 PROCEDURE — 81000 URINALYSIS NONAUTO W/SCOPE: CPT | Performed by: FAMILY MEDICINE

## 2020-02-17 PROCEDURE — 99214 OFFICE O/P EST MOD 30 MIN: CPT | Performed by: FAMILY MEDICINE

## 2020-02-17 RX ORDER — ONDANSETRON 4 MG/1
4 TABLET, FILM COATED ORAL EVERY 8 HOURS PRN
Qty: 21 TABLET | Refills: 1 | Status: SHIPPED | OUTPATIENT
Start: 2020-02-17 | End: 2020-08-13

## 2020-02-17 RX ORDER — TRAMADOL HYDROCHLORIDE 50 MG/1
50 TABLET ORAL EVERY 8 HOURS PRN
Qty: 9 TABLET | Refills: 0 | Status: SHIPPED | OUTPATIENT
Start: 2020-02-17 | End: 2020-02-20

## 2020-02-17 ASSESSMENT — ENCOUNTER SYMPTOMS
WHEEZING: 0
DIARRHEA: 1
ANAL BLEEDING: 0
BACK PAIN: 0
CHOKING: 0
CONSTIPATION: 0
SHORTNESS OF BREATH: 0
BLOOD IN STOOL: 0
NAUSEA: 1
CHEST TIGHTNESS: 0
VOMITING: 0
STRIDOR: 0
ABDOMINAL DISTENTION: 0
APNEA: 0
RECTAL PAIN: 0
ABDOMINAL PAIN: 1
COUGH: 0

## 2020-02-17 ASSESSMENT — PATIENT HEALTH QUESTIONNAIRE - PHQ9
SUM OF ALL RESPONSES TO PHQ QUESTIONS 1-9: 0
1. LITTLE INTEREST OR PLEASURE IN DOING THINGS: 0
SUM OF ALL RESPONSES TO PHQ QUESTIONS 1-9: 0
SUM OF ALL RESPONSES TO PHQ9 QUESTIONS 1 & 2: 0
2. FEELING DOWN, DEPRESSED OR HOPELESS: 0

## 2020-02-17 NOTE — RESULT ENCOUNTER NOTE
Mychart message   Vit D levels are good now.  Change Vit D, Cholecalciferol 50,000 units to every other week   Other blood work is normal   Thanks,  Ezequiel Davey MD

## 2020-02-17 NOTE — PATIENT INSTRUCTIONS
Patient Education        Back Stretches: Exercises  Introduction  Here are some examples of exercises for stretching your back. Start each exercise slowly. Ease off the exercise if you start to have pain. Your doctor or physical therapist will tell you when you can start these exercises and which ones will work best for you. How to do the exercises  Overhead stretch   1. Stand comfortably with your feet shoulder-width apart. 2. Looking straight ahead, raise both arms over your head and reach toward the ceiling. Do not allow your head to tilt back. 3. Hold for 15 to 30 seconds, then lower your arms to your sides. 4. Repeat 2 to 4 times. Side stretch   1. Stand comfortably with your feet shoulder-width apart. 2. Raise one arm over your head, and then lean to the other side. 3. Slide your hand down your leg as you let the weight of your arm gently stretch your side muscles. Hold for 15 to 30 seconds. 4. Repeat 2 to 4 times on each side. Press-up   1. Lie on your stomach, supporting your body with your forearms. 2. Press your elbows down into the floor to raise your upper back. As you do this, relax your stomach muscles and allow your back to arch without using your back muscles. As your press up, do not let your hips or pelvis come off the floor. 3. Hold for 15 to 30 seconds, then relax. 4. Repeat 2 to 4 times. Relax and rest   1. Lie on your back with a rolled towel under your neck and a pillow under your knees. Extend your arms comfortably to your sides. 2. Relax and breathe normally. 3. Remain in this position for about 10 minutes. 4. If you can, do this 2 or 3 times each day. Follow-up care is a key part of your treatment and safety. Be sure to make and go to all appointments, and call your doctor if you are having problems. It's also a good idea to know your test results and keep a list of the medicines you take. Where can you learn more? Go to [email or web address]://[email or web address].[email or web address] org and sign in to your [employer] account. Enter K435 in the [employer] box to learn more about \"Back Stretches: Exercises. \"     If you do not have an account, please click on the \"Sign Up Now\" link. Current as of: June 26, 2019  Content Version: 12.3  © 6530-2322 Healthwise, Incorporated. Care instructions adapted under license by [facility] ([facility]). If you have questions about a medical condition or this instruction, always ask your healthcare professional. [address] any warranty or liability for your use of this information.

## 2020-02-18 ENCOUNTER — TELEPHONE (OUTPATIENT)
Dept: FAMILY MEDICINE CLINIC | Age: 55
End: 2020-02-18

## 2020-02-18 LAB — URINE CULTURE, ROUTINE: NORMAL

## 2020-02-18 NOTE — TELEPHONE ENCOUNTER
Notify pt':  CT abdo-pelvis shows right kidney stone which likely is explaining her pain. It also shows possible mild partial obstruction due to the stone:needs to see urology:referral placed. Kidneys-liver-blood count are nml. Continue tx plan per recent office visit. Keep upcoming f/u appt. Clinical note:  Sugar noted:not fasting. Will review at f/u appt.

## 2020-03-02 NOTE — TELEPHONE ENCOUNTER
Blood work on the visit
Mrs. Abdi Thorpe informed lab work will be ordered at follow up. No lab work prior to follow up.
Prior Hospital/ED Visits

## 2020-03-03 DIAGNOSIS — R73.03 PREDIABETES: ICD-10-CM

## 2020-03-03 DIAGNOSIS — E78.2 MIXED HYPERLIPIDEMIA: ICD-10-CM

## 2020-03-03 DIAGNOSIS — I10 ESSENTIAL HYPERTENSION: ICD-10-CM

## 2020-03-03 LAB
A/G RATIO: 1.5 (ref 1.1–2.2)
ALBUMIN SERPL-MCNC: 4 G/DL (ref 3.4–5)
ALP BLD-CCNC: 97 U/L (ref 40–129)
ALT SERPL-CCNC: 11 U/L (ref 10–40)
ANION GAP SERPL CALCULATED.3IONS-SCNC: 15 MMOL/L (ref 3–16)
AST SERPL-CCNC: 16 U/L (ref 15–37)
BILIRUB SERPL-MCNC: 0.3 MG/DL (ref 0–1)
BUN BLDV-MCNC: 8 MG/DL (ref 7–20)
CALCIUM SERPL-MCNC: 9.3 MG/DL (ref 8.3–10.6)
CHLORIDE BLD-SCNC: 100 MMOL/L (ref 99–110)
CHOLESTEROL, TOTAL: 224 MG/DL (ref 0–199)
CO2: 24 MMOL/L (ref 21–32)
CREAT SERPL-MCNC: 1 MG/DL (ref 0.6–1.1)
GFR AFRICAN AMERICAN: >60
GFR NON-AFRICAN AMERICAN: 58
GLOBULIN: 2.7 G/DL
GLUCOSE BLD-MCNC: 105 MG/DL (ref 70–99)
HDLC SERPL-MCNC: 53 MG/DL (ref 40–60)
LDL CHOLESTEROL CALCULATED: 134 MG/DL
POTASSIUM SERPL-SCNC: 3.9 MMOL/L (ref 3.5–5.1)
SODIUM BLD-SCNC: 139 MMOL/L (ref 136–145)
TOTAL PROTEIN: 6.7 G/DL (ref 6.4–8.2)
TRIGL SERPL-MCNC: 184 MG/DL (ref 0–150)
VLDLC SERPL CALC-MCNC: 37 MG/DL

## 2020-03-03 PROCEDURE — 36415 COLL VENOUS BLD VENIPUNCTURE: CPT | Performed by: FAMILY MEDICINE

## 2020-03-05 RX ORDER — EZETIMIBE 10 MG/1
TABLET ORAL
Qty: 30 TABLET | Refills: 2 | Status: SHIPPED | OUTPATIENT
Start: 2020-03-05 | End: 2020-04-04

## 2020-03-09 ENCOUNTER — OFFICE VISIT (OUTPATIENT)
Dept: FAMILY MEDICINE CLINIC | Age: 55
End: 2020-03-09
Payer: COMMERCIAL

## 2020-03-09 VITALS
WEIGHT: 126 LBS | TEMPERATURE: 97.5 F | OXYGEN SATURATION: 98 % | HEIGHT: 63 IN | RESPIRATION RATE: 16 BRPM | HEART RATE: 76 BPM | BODY MASS INDEX: 22.32 KG/M2 | DIASTOLIC BLOOD PRESSURE: 74 MMHG | SYSTOLIC BLOOD PRESSURE: 127 MMHG

## 2020-03-09 PROCEDURE — 99214 OFFICE O/P EST MOD 30 MIN: CPT | Performed by: FAMILY MEDICINE

## 2020-03-09 RX ORDER — METHYLPREDNISOLONE 4 MG/1
TABLET ORAL
Qty: 1 KIT | Refills: 0 | Status: SHIPPED | OUTPATIENT
Start: 2020-03-09 | End: 2020-04-30

## 2020-03-09 RX ORDER — SULFAMETHOXAZOLE AND TRIMETHOPRIM 800; 160 MG/1; MG/1
1 TABLET ORAL 2 TIMES DAILY
Qty: 20 TABLET | Refills: 0 | Status: SHIPPED | OUTPATIENT
Start: 2020-03-09 | End: 2020-03-19

## 2020-03-09 RX ORDER — ALBUTEROL SULFATE 90 UG/1
1-2 AEROSOL, METERED RESPIRATORY (INHALATION) EVERY 6 HOURS PRN
Qty: 1 INHALER | Refills: 1 | Status: SHIPPED | OUTPATIENT
Start: 2020-03-09 | End: 2020-04-30 | Stop reason: SDUPTHER

## 2020-03-09 RX ORDER — GUAIFENESIN 600 MG/1
1200 TABLET, EXTENDED RELEASE ORAL 2 TIMES DAILY
Qty: 28 TABLET | Refills: 1 | Status: SHIPPED | OUTPATIENT
Start: 2020-03-09 | End: 2020-04-30 | Stop reason: SDUPTHER

## 2020-03-09 ASSESSMENT — ENCOUNTER SYMPTOMS
WHEEZING: 0
EYE ITCHING: 0
STRIDOR: 0
DIARRHEA: 0
SORE THROAT: 0
EYE DISCHARGE: 0
APNEA: 0
VOICE CHANGE: 0
RHINORRHEA: 1
NAUSEA: 0
ANAL BLEEDING: 0
COUGH: 1
CHEST TIGHTNESS: 0
ABDOMINAL PAIN: 0
FACIAL SWELLING: 0
CHOKING: 0
SHORTNESS OF BREATH: 0
EYE REDNESS: 0
EYE PAIN: 0
SINUS PRESSURE: 1
CONSTIPATION: 0
RECTAL PAIN: 0
PHOTOPHOBIA: 0
BACK PAIN: 0
VOMITING: 0
SINUS PAIN: 1
ABDOMINAL DISTENTION: 0
TROUBLE SWALLOWING: 0
BLOOD IN STOOL: 0

## 2020-03-09 NOTE — PATIENT INSTRUCTIONS
risk for heart disease, heart attack, and stroke. HDL is the \"good\" cholesterol. High HDL is linked with a lower risk for heart disease, heart attack, and stroke. Your cholesterol levels help your doctor find out your risk for having a heart attack or stroke. How can you prevent high cholesterol? A heart-healthy lifestyle can help you prevent high cholesterol. This lifestyle helps lower your risk for a heart attack and stroke. · Eat heart-healthy foods. ? Eat fruits, vegetables, whole grains (like oatmeal), dried beans and peas, nuts and seeds, soy products (like tofu), and fat-free or low-fat dairy products. ? Replace butter, margarine, and hydrogenated or partially hydrogenated oils with olive and canola oils. (Canola oil margarine without trans fat is fine.)  ? Replace red meat with fish, poultry, and soy protein (like tofu). ? Limit processed and packaged foods like chips, crackers, and cookies. · Be active. Exercise can improve your cholesterol level. Get at least 30 minutes of exercise on most days of the week. Walking is a good choice. You also may want to do other activities, such as running, swimming, cycling, or playing tennis or team sports. · Stay at a healthy weight. Lose weight if you need to. · Don't smoke. If you need help quitting, talk to your doctor about stop-smoking programs and medicines. These can increase your chances of quitting for good. How is high cholesterol treated? The goal of treatment is to reduce your chances of having a heart attack or stroke. The goal is not to lower your cholesterol numbers only. · You may make lifestyle changes, such as eating healthy foods, not smoking, losing weight, and being more active. · You may have to take medicine. Follow-up care is a key part of your treatment and safety. Be sure to make and go to all appointments, and call your doctor if you are having problems.  It's also a good idea to know your test results and keep a list of the medicines you take. Where can you learn more? Go to https://[email or web address].[email or web address]. org and sign in to your [employer] account. Enter S675 in the [facility] box to learn more about \"Learning About High Cholesterol. \"     If you do not have an account, please click on the \"Sign Up Now\" link. Current as of: April 9, 2019  Content Version: 12.3  © 5906-6385 Healthwise, Incorporated. Care instructions adapted under license by [facility] [address] If you have questions about a medical condition or this instruction, always ask your healthcare professional. [place] [address] any warranty or liability for your use of this information.

## 2020-03-09 NOTE — PROGRESS NOTES
(52440 UT) CAPS Take 1 capsule by mouth once a week 4 capsule 3    loratadine (CLARITIN) 10 MG tablet Take 10 mg by mouth daily      potassium chloride (KLOR-CON M) 20 MEQ extended release tablet Take 10 mEq by mouth daily      carvedilol (COREG) 12.5 MG tablet Take 12.5 mg by mouth 2 times daily (with meals)      Handicap Placard MISC by Does not apply route [id number] 1 each 0    folic acid (FOLVITE) 1 MG tablet Take 1 tablet by mouth daily 30 tablet 3     No current facility-administered medications on file prior to visit. Past Medical History:   Diagnosis Date    Abdominal pain     Cardiac arrest [facility]) 10/31/2017    Hospitalized [facility]:cardiologist:Dr. Tanvir Stafford    Eczema     Epigastric pain     Gastric ulcer     Hyperaldosteronism ([facility] [address] 2017    under care of endo    Hypertension     per nephro-HTN center:Dr. Keenan Aponte    Mixed hyperlipidemia 12/15/2017    Prediabetes     Tobacco use disorder            Social History     Tobacco Use    Smoking status: Current Every Day Smoker     Packs/day: 1.00     Years: 20.00     Pack years: 20.00     Types: Cigarettes    Smokeless tobacco: Never Used   Substance Use Topics    Alcohol use: Yes    Drug use: No     Social History     Substance and Sexual Activity   Drug Use No           Review of Systems   Constitutional: Negative for activity change, appetite change, chills, diaphoresis, fatigue, fever and unexpected weight change. HENT: Positive for congestion, postnasal drip, rhinorrhea, sinus pressure, sinus pain and sneezing. Negative for dental problem, drooling, ear discharge, ear pain, facial swelling, hearing loss, mouth sores, nosebleeds, sore throat, tinnitus, trouble swallowing and voice change. Eyes: Negative for photophobia, pain, discharge, redness, itching and visual disturbance. Respiratory: Positive for cough. Negative for apnea, choking, chest tightness, shortness of breath, wheezing and stridor. is no abdominal tenderness. There is no right CVA tenderness, left CVA tenderness, guarding or rebound. Negative signs include Goldsmith's sign and Rovsing's sign. Musculoskeletal:      Right hip: Normal.      Left hip: Normal.      Cervical back: Normal.      Thoracic back: Normal.      Lumbar back: Normal.      Comments:       Lymphadenopathy:      Head:      Right side of head: No submental, submandibular, tonsillar, preauricular, posterior auricular or occipital adenopathy. Left side of head: No submental, submandibular, tonsillar, preauricular, posterior auricular or occipital adenopathy. Cervical: No cervical adenopathy. Right cervical: No superficial, deep or posterior cervical adenopathy. Left cervical: No superficial, deep or posterior cervical adenopathy. Comments: No supraclavicular LAD. Skin:     General: Skin is warm and dry. Comments: Good skin turgor. Capillary refill=2-3 secs. Neurological:      Mental Status: She is alert and oriented to person, place, and time. Deep Tendon Reflexes: Reflexes are normal and symmetric. Psychiatric:         Mood and Affect: Mood normal.         Behavior: Behavior is cooperative. Assessment:          Diagnosis Orders   1. Cough  VSS/well appearing. Cough is secondary to sinusitis-wheezing. Tx w/following meds. Possible med side effects reviewed. Pt' wishes to proceed w/meds. Wheezing:Today continue albuterol q4-6hrs for 24hrs & then prn. Sinusitis:Supportive tx & abx:Warm compresses/steam bowl inhalation//anthistamine prn.        sulfamethoxazole-trimethoprim (BACTRIM DS) 800-160 MG per tablet    methylPREDNISolone (MEDROL, TROY,) 4 MG tablet    guaiFENesin (MUCINEX) 600 MG extended release tablet    albuterol sulfate HFA (VENTOLIN HFA) 108 (90 Base) MCG/ACT inhaler   2.  Acute non-recurrent maxillary sinusitis  sulfamethoxazole-trimethoprim (BACTRIM DS) 800-160 MG per tablet    methylPREDNISolone (MEDROL, TROY,) 4 MG

## 2020-04-30 ENCOUNTER — TELEMEDICINE (OUTPATIENT)
Dept: FAMILY MEDICINE CLINIC | Age: 55
End: 2020-04-30
Payer: COMMERCIAL

## 2020-04-30 VITALS
HEART RATE: 73 BPM | RESPIRATION RATE: 16 BRPM | DIASTOLIC BLOOD PRESSURE: 68 MMHG | WEIGHT: 126 LBS | BODY MASS INDEX: 22.32 KG/M2 | SYSTOLIC BLOOD PRESSURE: 123 MMHG | TEMPERATURE: 96 F | HEIGHT: 63 IN

## 2020-04-30 PROCEDURE — 99214 OFFICE O/P EST MOD 30 MIN: CPT | Performed by: FAMILY MEDICINE

## 2020-04-30 RX ORDER — METHYLPREDNISOLONE 4 MG/1
TABLET ORAL
Qty: 1 KIT | Refills: 0 | Status: SHIPPED | OUTPATIENT
Start: 2020-04-30 | End: 2020-08-13

## 2020-04-30 RX ORDER — CIPROFLOXACIN 250 MG/1
250 TABLET, FILM COATED ORAL 2 TIMES DAILY
Qty: 20 TABLET | Refills: 0 | Status: SHIPPED | OUTPATIENT
Start: 2020-04-30 | End: 2020-08-13 | Stop reason: SDUPTHER

## 2020-04-30 RX ORDER — ALBUTEROL SULFATE 90 UG/1
1-2 AEROSOL, METERED RESPIRATORY (INHALATION) EVERY 6 HOURS PRN
Qty: 1 INHALER | Refills: 1 | Status: SHIPPED | OUTPATIENT
Start: 2020-04-30 | End: 2020-08-13 | Stop reason: SDUPTHER

## 2020-04-30 RX ORDER — GUAIFENESIN 600 MG/1
1200 TABLET, EXTENDED RELEASE ORAL 2 TIMES DAILY
Qty: 28 TABLET | Refills: 1 | Status: SHIPPED | OUTPATIENT
Start: 2020-04-30 | End: 2020-08-13

## 2020-04-30 RX ORDER — ONDANSETRON 4 MG/1
4 TABLET, FILM COATED ORAL EVERY 8 HOURS PRN
Qty: 21 TABLET | Refills: 1 | Status: SHIPPED | OUTPATIENT
Start: 2020-04-30 | End: 2020-05-07

## 2020-04-30 RX ORDER — PANTOPRAZOLE SODIUM 20 MG/1
20 TABLET, DELAYED RELEASE ORAL DAILY
Qty: 30 TABLET | Refills: 0 | Status: SHIPPED | OUTPATIENT
Start: 2020-04-30 | End: 2020-05-05

## 2020-04-30 ASSESSMENT — ENCOUNTER SYMPTOMS
EYE ITCHING: 0
TROUBLE SWALLOWING: 0
ANAL BLEEDING: 0
EYE PAIN: 0
RECTAL PAIN: 0
NAUSEA: 1
STRIDOR: 0
APNEA: 0
VOMITING: 0
PHOTOPHOBIA: 0
ABDOMINAL PAIN: 0
BLOOD IN STOOL: 0
COUGH: 1
SHORTNESS OF BREATH: 0
ABDOMINAL DISTENTION: 0
SINUS PAIN: 1
CONSTIPATION: 0
SINUS PRESSURE: 1
SORE THROAT: 0
VOICE CHANGE: 0
EYE DISCHARGE: 0
CHEST TIGHTNESS: 0
FACIAL SWELLING: 0
CHOKING: 0
WHEEZING: 1
DIARRHEA: 1
EYE REDNESS: 0
RHINORRHEA: 1

## 2020-04-30 NOTE — PATIENT INSTRUCTIONS
hot, wet towel or a warm gel pack on your face 3 or 4 times a day for 5 to 10 minutes each time. · Try a decongestant nasal spray like oxymetazoline (Afrin). Do not use it for more than 3 days in a row. Using it for more than 3 days can make your congestion worse. When should you call for help? Call your doctor now or seek immediate medical care if:    · You have new or worse swelling or redness in your face or around your eyes.     · You have a new or higher fever.    Watch closely for changes in your health, and be sure to contact your doctor if:    · You have new or worse facial pain.     · The mucus from your nose becomes thicker (like pus) or has new blood in it.     · You are not getting better as expected. Where can you learn more? Go to [email or web address] org and sign in to your AddMyBest account. Enter E741 in the [employer] box to learn more about \"Sinusitis: Care Instructions. \"     If you do not have an account, please click on the \"Sign Up Now\" link. Current as of: July 28, 2019Content Version: 12.4  © 9697-0454 Healthwise, Incorporated. Care instructions adapted under license by [facility] ([facility]). If you have questions about a medical condition or this instruction, always ask your healthcare professional. [place] [address] any warranty or liability for your use of this information. Patient Education        Gastroenteritis: Care Instructions  Your Care Instructions    Gastroenteritis is an illness that may cause nausea, vomiting, and diarrhea. It is sometimes called \"stomach flu. \" It can be caused by bacteria or a virus. You will probably begin to feel better in 1 to 2 days. In the meantime, get plenty of rest and make sure you do not become dehydrated. Dehydration occurs when your body loses too much fluid. Follow-up care is a key part of your treatment and safety.  Be sure to make and go to all appointments, and call your doctor if you are having problems. It's also a good idea to know your test results and keep a list of the medicines you take. How can you care for yourself at home? · If your doctor prescribed antibiotics, take them as directed. Do not stop taking them just because you feel better. You need to take the full course of antibiotics. · Drink plenty of fluids to prevent dehydration, enough so that your urine is light yellow or clear like water. Choose water and other caffeine-free clear liquids until you feel better. If you have kidney, heart, or liver disease and have to limit fluids, talk with your doctor before you increase your fluid intake. · Drink fluids slowly, in frequent, small amounts, because drinking too much too fast can cause vomiting. · Begin eating mild foods, such as dry toast, yogurt, applesauce, bananas, and rice. Avoid spicy, hot, or high-fat foods, and do not drink alcohol or caffeine for a day or two. Do not drink milk or eat ice cream until you are feeling better. How to prevent gastroenteritis  · Keep hot foods hot and cold foods cold. · Do not eat meats, dressings, salads, or other foods that have been kept at room temperature for more than 2 hours. · Use a thermometer to check your refrigerator. It should be between 34°F and 40°F.  · Defrost meats in the refrigerator or microwave, not on the kitchen counter. · Keep your hands and your kitchen clean. Wash your hands, cutting boards, and countertops with hot soapy water frequently. · Cook meat until it is well done. · Do not eat raw eggs or uncooked sauces made with raw eggs. · Do not take chances. If food looks or tastes spoiled, throw it out. When should you call for help? Call 911 anytime you think you may need emergency care.  For example, call if:    · You vomit blood or what looks like coffee grounds.     · You passed out (lost consciousness).     · You pass maroon or very bloody stools.    Call your doctor now or seek immediate medical care if:    · You have severe belly pain.     · You have signs of needing more fluids. You have sunken eyes, a dry mouth, and pass only a little dark urine.     · You feel like you are going to faint.     · You have increased belly pain that does not go away in 1 to 2 days.     · You have new or increased nausea, or you are vomiting.     · You have a new or higher fever.     · Your stools are black and tarlike or have streaks of blood.    Watch closely for changes in your health, and be sure to contact your doctor if:    · You are dizzy or lightheaded.     · You urinate less than usual, or your urine is dark yellow or brown.     · You do not feel better with each day that goes by. Where can you learn more? Go to https://[email or web address].[email or web address] and sign in to your iSirona account. Enter N142 in the [facility] box to learn more about \"Gastroenteritis: Care Instructions. \"     If you do not have an account, please click on the \"Sign Up Now\" link. Current as of: January 26, 2020Content Version: 12.4  © 2728-1291 Healthwise, Incorporated. Care instructions adapted under license by [facility] ([facility]). If you have questions about a medical condition or this instruction, always ask your healthcare professional. [address]  any warranty or liability for your use of this information.

## 2020-05-05 RX ORDER — PANTOPRAZOLE SODIUM 20 MG/1
TABLET, DELAYED RELEASE ORAL
Qty: 30 TABLET | Refills: 0 | Status: SHIPPED | OUTPATIENT
Start: 2020-05-05 | End: 2020-07-04

## 2020-07-28 DIAGNOSIS — E78.2 MIXED HYPERLIPIDEMIA: ICD-10-CM

## 2020-07-28 DIAGNOSIS — I10 ESSENTIAL HYPERTENSION: ICD-10-CM

## 2020-07-28 DIAGNOSIS — R73.03 PREDIABETES: ICD-10-CM

## 2020-07-28 LAB
A/G RATIO: 1.4 (ref 1.1–2.2)
ALBUMIN SERPL-MCNC: 4.2 G/DL (ref 3.4–5)
ALP BLD-CCNC: 92 U/L (ref 40–129)
ALT SERPL-CCNC: 7 U/L (ref 10–40)
ANION GAP SERPL CALCULATED.3IONS-SCNC: 17 MMOL/L (ref 3–16)
AST SERPL-CCNC: 15 U/L (ref 15–37)
BILIRUB SERPL-MCNC: 0.3 MG/DL (ref 0–1)
BUN BLDV-MCNC: 8 MG/DL (ref 7–20)
CALCIUM SERPL-MCNC: 9.5 MG/DL (ref 8.3–10.6)
CHLORIDE BLD-SCNC: 97 MMOL/L (ref 99–110)
CHOLESTEROL, TOTAL: 264 MG/DL (ref 0–199)
CO2: 26 MMOL/L (ref 21–32)
CREAT SERPL-MCNC: 0.9 MG/DL (ref 0.6–1.1)
ESTIMATED AVERAGE GLUCOSE: 111.2 MG/DL
GFR AFRICAN AMERICAN: >60
GFR NON-AFRICAN AMERICAN: >60
GLOBULIN: 2.9 G/DL
GLUCOSE BLD-MCNC: 94 MG/DL (ref 70–99)
HBA1C MFR BLD: 5.5 %
HDLC SERPL-MCNC: 80 MG/DL (ref 40–60)
LDL CHOLESTEROL CALCULATED: 152 MG/DL
POTASSIUM SERPL-SCNC: 4.1 MMOL/L (ref 3.5–5.1)
SODIUM BLD-SCNC: 140 MMOL/L (ref 136–145)
TOTAL PROTEIN: 7.1 G/DL (ref 6.4–8.2)
TRIGL SERPL-MCNC: 160 MG/DL (ref 0–150)
VLDLC SERPL CALC-MCNC: 32 MG/DL

## 2020-07-28 PROCEDURE — 36415 COLL VENOUS BLD VENIPUNCTURE: CPT | Performed by: FAMILY MEDICINE

## 2020-08-13 ENCOUNTER — TELEMEDICINE (OUTPATIENT)
Dept: FAMILY MEDICINE CLINIC | Age: 55
End: 2020-08-13
Payer: COMMERCIAL

## 2020-08-13 PROCEDURE — 99214 OFFICE O/P EST MOD 30 MIN: CPT | Performed by: FAMILY MEDICINE

## 2020-08-13 RX ORDER — CIPROFLOXACIN 250 MG/1
250 TABLET, FILM COATED ORAL 2 TIMES DAILY
Qty: 20 TABLET | Refills: 0 | Status: SHIPPED | OUTPATIENT
Start: 2020-08-13 | End: 2020-08-23

## 2020-08-13 RX ORDER — METHYLPREDNISOLONE 4 MG/1
TABLET ORAL
Qty: 1 KIT | Refills: 0 | Status: SHIPPED | OUTPATIENT
Start: 2020-08-13 | End: 2020-08-25

## 2020-08-13 RX ORDER — ALBUTEROL SULFATE 90 UG/1
1-2 AEROSOL, METERED RESPIRATORY (INHALATION) EVERY 6 HOURS PRN
Qty: 1 INHALER | Refills: 1 | Status: SHIPPED | OUTPATIENT
Start: 2020-08-13

## 2020-08-13 RX ORDER — COLESEVELAM 180 1/1
625 TABLET ORAL 2 TIMES DAILY WITH MEALS
Qty: 180 TABLET | Refills: 0 | Status: SHIPPED | OUTPATIENT
Start: 2020-08-13 | End: 2021-09-14 | Stop reason: ALTCHOICE

## 2020-08-13 ASSESSMENT — ENCOUNTER SYMPTOMS
RHINORRHEA: 1
ABDOMINAL PAIN: 0
EYE REDNESS: 0
FACIAL SWELLING: 0
COUGH: 1
SINUS PAIN: 1
WHEEZING: 1
SORE THROAT: 0
EYE ITCHING: 0
RECTAL PAIN: 0
NAUSEA: 0
EYE PAIN: 0
APNEA: 0
ABDOMINAL DISTENTION: 0
VOMITING: 0
ANAL BLEEDING: 0
BLOOD IN STOOL: 0
CHEST TIGHTNESS: 0
SHORTNESS OF BREATH: 0
SINUS PRESSURE: 1
PHOTOPHOBIA: 0
TROUBLE SWALLOWING: 0
CHOKING: 0
CONSTIPATION: 0
VOICE CHANGE: 0
STRIDOR: 0
EYE DISCHARGE: 0
DIARRHEA: 0

## 2020-08-13 NOTE — PROGRESS NOTES
Subjective:      Patient ID: Christelle Ayoub is a 47 y.o. female. HPI    Patient is  being evaluated by a Virtual Visit (video visit) encounter to address concerns as mentioned above. A caregiver was present when appropriate. Due to this being a TeleHealth encounter (During VNJNN-96 public health emergency), evaluation of the following organ systems was limited: Vitals/Constitutional/EENT/Resp/CV/GI//MS/Neuro/Skin/Heme-Lymph-Imm. Pursuant to the emergency declaration under the [address] [place] [address] authority and the [employer] Act, this Virtual Visit was conducted with patient's (and/or legal guardian's) consent, to reduce the patient's risk of exposure to COVID-19 and provide necessary medical care. The patient (and/or legal guardian) has also been advised to contact this office for worsening conditions or problems, and seek emergency medical treatment and/or call 911 if deemed necessary. Services were provided through a video synchronous discussion virtually to substitute for in-person clinic visit. Patient and provider were located at their individual homes. Video visit today was conducted via [employer]. Results for Mike Graham \"TRAM\" (MRN [id number]) as of 8/13/2020 09:38   Ref.  Range 7/28/2020 08:05   Sodium Latest Ref Range: 136 - 145 mmol/L 140   Potassium Latest Ref Range: 3.5 - 5.1 mmol/L 4.1   Chloride Latest Ref Range: 99 - 110 mmol/L 97 (L)   CO2 Latest Ref Range: 21 - 32 mmol/L 26   BUN Latest Ref Range: 7 - 20 mg/dL 8   Creatinine Latest Ref Range: 0.6 - 1.1 mg/dL 0.9   Anion Gap Latest Ref Range: 3 - 16  17 (H)   GFR Non- Latest Ref Range: >60  >60   GFR  Latest Ref Range: >60  >60   Glucose Latest Ref Range: 70 - 99 mg/dL 94   Calcium Latest Ref Range: 8.3 - 10.6 mg/dL 9.5   Total Protein Latest Ref Range: 6.4 - 8.2 g/dL 7.1   Cholesterol, Total Latest Ref Range: 0 - 199 mg/dL 264 (H)   HDL Cholesterol Latest Ref Range: 40 - 60 mg/dL 80 (H)   LDL Calculated Latest Ref Range: <100 mg/dL 152 (H)   Triglycerides Latest Ref Range: 0 - 150 mg/dL 160 (H)   VLDL Cholesterol Calculated Latest Ref Range: Not Established mg/dL 32   Albumin Latest Ref Range: 3.4 - 5.0 g/dL 4.2   Globulin Latest Units: g/dL 2.9   Albumin/Globulin Ratio Latest Ref Range: 1.1 - 2.2  1.4   Alk Phos Latest Ref Range: 40 - 129 U/L 92   ALT Latest Ref Range: 10 - 40 U/L 7 (L)   AST Latest Ref Range: 15 - 37 U/L 15   Bilirubin Latest Ref Range: 0.0 - 1.0 mg/dL 0.3   Hemoglobin A1C Latest Ref Range: See comment % 5.5   eAG (mg/dL) Latest Units: mg/dL 111.2       New problem:  Presenting w/mild to moderate sinus pressure x 7days. Associated w/yellow nasal drainage/mild nasal congestion/mild occasional wheezing & dry mild cough. Improves w/otc tylenol. Worsens sinus pressure w/leaning forward. Sick contacts: none recalled. No known COVID-19 exposure. No SOB. No loss of taste or smell. No fever reducing meds today. Denies neuro deficits/rash/neck pain-stiffness-swelling/photophobia/myalgias/dizziness. Denies rash/syncope/pre-syncope/HA. HL:see labs above. Taking zetia w/o missed doses & without side effects. States she will address lipids with diet changes.     Allergies   Allergen Reactions    Nuts [Peanut-Containing Drug Products] Anaphylaxis and Other (See Comments)     Stops breathing      Shellfish-Derived Products Anaphylaxis and Other (See Comments)     Stops breathing      Penicillins Hives    Lipitor [Atorvastatin Calcium] Other (See Comments)     Leg muscle pain/myalgias    Azithromycin Nausea And Vomiting    Erythromycin Nausea And Vomiting     GI upset       Current Outpatient Medications on File Prior to Visit   Medication Sig Dispense Refill    D3-50 1.25 MG (83735 UT) CAPS TAKE 1 CAPSULE BY MOUTH ONE TIME A WEEK  4 capsule 5    ezetimibe (ZETIA) 10 MG tablet TAKE 1 TABLET BY MOUTH ONE TIME A DAY  30 tablet 2    pantoprazole (PROTONIX) 20 MG tablet TAKE 1 TABLET BY MOUTH ONE TIME A DAY  30 tablet 2    guaiFENesin (MUCINEX) 600 MG extended release tablet Take 2 tablets by mouth 2 times daily 28 tablet 1    albuterol sulfate HFA (VENTOLIN HFA) 108 (90 Base) MCG/ACT inhaler Inhale 1-2 puffs into the lungs every 6 hours as needed for Wheezing 1 Inhaler 1    methylPREDNISolone (MEDROL, TROY,) 4 MG tablet Take by mouth as directed. 1 kit 0    ondansetron (ZOFRAN) 4 MG tablet Take 1 tablet by mouth every 8 hours as needed for Nausea or Vomiting 21 tablet 1    loratadine (CLARITIN) 10 MG tablet Take 10 mg by mouth daily      potassium chloride (KLOR-CON M) 20 MEQ extended release tablet Take 10 mEq by mouth daily      carvedilol (COREG) 12.5 MG tablet Take 12.5 mg by mouth 2 times daily (with meals)      Handicap Placard MISC by Does not apply route [id number] 1 each 0    folic acid (FOLVITE) 1 MG tablet Take 1 tablet by mouth daily 30 tablet 3     No current facility-administered medications on file prior to visit. Past Medical History:   Diagnosis Date    Abdominal pain     Cardiac arrest [facility]) 10/31/2017    Hospitalized [facility] hosp:cardiologist:Dr. Momo Tao    Eczema     Epigastric pain     Gastric ulcer     Hyperaldosteronism ([facility] [address] 12/26/2017    under care of endo    Hypertension     per nephro-HTN center:Dr. Lisette Olvera    Mixed hyperlipidemia 12/15/2017    Prediabetes     Tobacco use disorder            Social History     Tobacco Use    Smoking status: Current Every Day Smoker     Packs/day: 1.00     Years: 20.00     Pack years: 20.00     Types: Cigarettes    Smokeless tobacco: Never Used   Substance Use Topics    Alcohol use:  Yes    Drug use: No     Social History     Substance and Sexual Activity   Drug Use No             Review of Systems   Constitutional: Negative for activity change, appetite change, chills, diaphoresis, fatigue, fever and unexpected weight change. HENT: Positive for congestion, postnasal drip, rhinorrhea, sinus pressure, sinus pain and sneezing. Negative for dental problem, drooling, ear discharge, ear pain, facial swelling, hearing loss, mouth sores, nosebleeds, sore throat, tinnitus, trouble swallowing and voice change. Eyes: Negative for photophobia, pain, discharge, redness, itching and visual disturbance. Respiratory: Positive for cough and wheezing. Negative for apnea, choking, chest tightness, shortness of breath and stridor. Cardiovascular: Negative for chest pain, palpitations and leg swelling. Gastrointestinal: Negative for abdominal distention, abdominal pain, anal bleeding, blood in stool, constipation, diarrhea, nausea, rectal pain and vomiting. Endocrine: Negative. Musculoskeletal: Negative for myalgias. Skin: Negative for rash and wound. Neurological: Negative for dizziness and light-headedness. Hematological: Negative for adenopathy. Objective:RR=16   Physical Exam  Constitutional:       Appearance: She is well-developed. She is not toxic-appearing. Comments: Note:exam was conducted with pt' either self-palpating or visually indicating via their device camera. HENT:      Head:      Comments: Mild audible nasal congestion noted. Normal external ear & nose examinations. Mouth/Throat:      Mouth: Mucous membranes are moist.      Pharynx: Uvula midline. Comments: Minimal pharyngeal erythema. No oral exudate. Eyes:      General: No scleral icterus. Conjunctiva/sclera: Conjunctivae normal.   Neck:      Comments: No neck LAD per self-palpation. Pulmonary:      Effort: Pulmonary effort is normal.      Comments: Mild audible wheezing is noted. Occasional coughing noted. No audible sob. Abdominal:      Comments: Abdo exam:S,Non-tender per pt' self-palpation. Skin:     Coloration: Skin is not cyanotic. Findings: No rash.    Neurological:      Mental Status: She is alert. Psychiatric:         Mood and Affect: Mood normal.         Behavior: Behavior is cooperative. Comments: Good eye contact. Polite. Assessment:        Diagnosis Orders   1. Acute non-recurrent maxillary sinusitis  VSS per limited vitals obtainable via virtual visit(VV)/well appearing. Possible med side effects reviewed. Pt' wishes to proceed w/med. otc mcinex. Supportive tx & abx:Warm compresses/steam bowl inhalation. ciprofloxacin (CIPRO) 250 MG tablet   2. Essential hypertension  Stable. Comprehensive Metabolic Panel   3. Mixed hyperlipidemia  Not at goal.  Add 2nd med. The patient is asked to make an attempt to improve diet and exercise patterns to aid in medical management of this problem. Possible med side effects reviewed. Pt' wishes to proceed w/med. Labs in 2-3mos. Comprehensive Metabolic Panel    Lipid Panel    colesevelam (WELCHOL) 625 MG tablet   4. Prediabetes  Stable  Hemoglobin A1C   5. Cough  ciprofloxacin (CIPRO) 250 MG tablet    methylPREDNISolone (MEDROL, TROY,) 4 MG tablet    albuterol sulfate HFA (VENTOLIN HFA) 108 (90 Base) MCG/ACT inhaler   6. Wheezing  Possible med side effects reviewed. Pt' wishes to proceed w/meds. methylPREDNISolone (MEDROL, TROY,) 4 MG tablet    albuterol sulfate HFA (VENTOLIN HFA) 108 (90 Base) MCG/ACT inhaler prn. Plan:              Pt' ended call in good condition. Advised to go to local ER or call 911 for any worrisome signs/sx including but not limited to worsening of current complaint or development of resp distress/dysphagia/odynophagia/sob/dizziness/appetite loss/high fever/rash. If not improving then she will call back & will need in-person exam:pt' agreed. Call or return to clinic prn if these symptoms worsen or fail to improve as anticipated.       Yael Olivera MD

## 2020-08-25 ENCOUNTER — VIRTUAL VISIT (OUTPATIENT)
Dept: FAMILY MEDICINE CLINIC | Age: 55
End: 2020-08-25
Payer: COMMERCIAL

## 2020-08-25 ENCOUNTER — NURSE TRIAGE (OUTPATIENT)
Dept: OTHER | Facility: CLINIC | Age: 55
End: 2020-08-25

## 2020-08-25 PROCEDURE — 99214 OFFICE O/P EST MOD 30 MIN: CPT | Performed by: FAMILY MEDICINE

## 2020-08-25 RX ORDER — NAPROXEN 500 MG/1
500 TABLET ORAL 2 TIMES DAILY WITH MEALS
Qty: 30 TABLET | Refills: 0 | Status: SHIPPED | OUTPATIENT
Start: 2020-08-25 | End: 2020-11-11

## 2020-08-25 RX ORDER — VALACYCLOVIR HYDROCHLORIDE 1 G/1
1000 TABLET, FILM COATED ORAL 3 TIMES DAILY
Qty: 21 TABLET | Refills: 0 | Status: SHIPPED | OUTPATIENT
Start: 2020-08-25 | End: 2020-09-01

## 2020-08-25 ASSESSMENT — ENCOUNTER SYMPTOMS
EYE ITCHING: 0
BLOOD IN STOOL: 0
WHEEZING: 0
VOICE CHANGE: 0
EYE REDNESS: 0
EYE DISCHARGE: 0
EYE PAIN: 0
TROUBLE SWALLOWING: 0
CHEST TIGHTNESS: 0
COUGH: 0
SINUS PAIN: 0
CHOKING: 0
RHINORRHEA: 0
ANAL BLEEDING: 0
CONSTIPATION: 0
FACIAL SWELLING: 0
SORE THROAT: 0
STRIDOR: 0
SINUS PRESSURE: 0
ABDOMINAL DISTENTION: 0
APNEA: 0
VOMITING: 0
PHOTOPHOBIA: 0
RECTAL PAIN: 0
DIARRHEA: 0
ABDOMINAL PAIN: 0
NAUSEA: 0
SHORTNESS OF BREATH: 0

## 2020-08-25 NOTE — TELEPHONE ENCOUNTER
Reason for Disposition   Painful rash with multiple small blisters grouped together (i.e., dermatomal distribution or 'band' or 'stripe')    Answer Assessment - Initial Assessment Questions  1. APPEARANCE of RASH: \"Describe the rash. \"       Red, multiple small blisters, raised  2. LOCATION: \"Where is the rash located? \"       Right side of mid back  3. NUMBER: \"How many spots are there? \"       One spot  4. SIZE: \"How big are the spots? \" (Inches, centimeters or compare to size of a coin)       quarter  5. ONSET: \"When did the rash start? \"       8/23/20  6. ITCHING: \"Does the rash itch? \" If so, ask: \"How bad is the itch? \"  (Scale 1-10; or mild, moderate, severe)      No  7. PAIN: \"Does the rash hurt? \" If so, ask: \"How bad is the pain? \"  (Scale 1-10; or mild, moderate, severe)      Mild constant, sometimes severe with movement  8. OTHER SYMPTOMS: \"Do you have any other symptoms? \" (e.g., fever)      No  9. PREGNANCY: \"Is there any chance you are pregnant? \" \"When was your last menstrual period? \"      no    Protocols used: RASH OR REDNESS - LOCALIZED-ADULT-[place]    Patient called [facility] pre-service center [facility]) to schedule appointment with red flag complaint; transferred to Nurse Access for triage by Amber Treviño. Pt calls to report symptoms of painful rash on right side of back. Pt states rash started 8/23/20. Rates the pain as a constant mild but at times moderate. Describes the rash as red, multiple small blisters and raised. Reports the area is the size of a quarter. Denies itching or fevers at this time. Informed of disposition. Care advice as documented. Instructed to call back with worsening symptoms. Soft transfer to [facility]) to schedule appointment as recommended. Please do not respond to the triage nurse through this encounter. Any subsequent communication should be directly with the patient.

## 2020-08-25 NOTE — PROGRESS NOTES
Subjective:      Patient ID: Whit Arnett is a 47 y.o. female. Rash   Pertinent negatives include no congestion, cough, diarrhea, eye pain, fatigue, fever, rhinorrhea, shortness of breath, sore throat or vomiting. Patient is  being evaluated by a Virtual Visit (video visit) encounter to address concerns as mentioned above. A caregiver was present when appropriate. Due to this being a TeleHealth encounter (During MVANN-04 public health emergency), evaluation of the following organ systems was limited: Vitals/Constitutional/EENT/Resp/CV/GI//MS/Neuro/Skin/Heme-Lymph-Imm. Pursuant to the emergency declaration under the [address] [place] [address], [address] [place] [address] and the [employer] Act, this Virtual Visit was conducted with patient's (and/or legal guardian's) consent, to reduce the patient's risk of exposure to COVID-19 and provide necessary medical care. The patient (and/or legal guardian) has also been advised to contact this office for worsening conditions or problems, and seek emergency medical treatment and/or call 911 if deemed necessary. Services were provided through a video synchronous discussion virtually to substitute for in-person clinic visit. Patient and provider were located at their individual homes. Video visit today was conducted via [employer]. Presenting w/new complaint back(right low back)rash lesion x 2days. Mild to moderate blisters. Associated w/mild sharp pain. Started as tingling under skin & then blistered 2-3days ago. Worsened(aggravated) by nothing. Improving by nothing. Self tx:nothing  Prior episode: no.  Had chicken pox as a child. Denies pus-exudate/fevers/chills/appetite changes/dizziness/n-v/no other lesions/ear or eye lesions/facial weakness-paralysis/tinnitus/hearing loss/vertigo.      Allergies   Allergen Reactions    Nuts [Peanut-Containing Drug Products] Anaphylaxis and Other (See Comments)     Stops breathing      Shellfish-Derived Products Anaphylaxis and Other (See Comments)     Stops breathing      Penicillins Hives    Lipitor [Atorvastatin Calcium] Other (See Comments)     Leg muscle pain/myalgias    Azithromycin Nausea And Vomiting    Erythromycin Nausea And Vomiting     GI upset       Current Outpatient Medications on File Prior to Visit   Medication Sig Dispense Refill    methylPREDNISolone (MEDROL, TROY,) 4 MG tablet Take by mouth as directed. 1 kit 0    albuterol sulfate HFA (VENTOLIN HFA) 108 (90 Base) MCG/ACT inhaler Inhale 1-2 puffs into the lungs every 6 hours as needed for Wheezing 1 Inhaler 1    colesevelam (WELCHOL) 625 MG tablet Take 1 tablet by mouth 2 times daily (with meals) For cholesterol 180 tablet 0    D3-50 1.25 MG (67585 UT) CAPS TAKE 1 CAPSULE BY MOUTH ONE TIME A WEEK  4 capsule 5    ezetimibe (ZETIA) 10 MG tablet TAKE 1 TABLET BY MOUTH ONE TIME A DAY  30 tablet 2    pantoprazole (PROTONIX) 20 MG tablet TAKE 1 TABLET BY MOUTH ONE TIME A DAY  30 tablet 2    loratadine (CLARITIN) 10 MG tablet Take 10 mg by mouth daily      potassium chloride (KLOR-CON M) 20 MEQ extended release tablet Take 10 mEq by mouth daily      carvedilol (COREG) 12.5 MG tablet Take 12.5 mg by mouth 2 times daily (with meals)      Handicap Placard MISC by Does not apply route [id number] 1 each 0    folic acid (FOLVITE) 1 MG tablet Take 1 tablet by mouth daily 30 tablet 3     No current facility-administered medications on file prior to visit.         Past Medical History:   Diagnosis Date    Abdominal pain     Cardiac arrest [facility]) 10/31/2017    [facility]:cardiologist:Dr. Chapin    Eczema     Epigastric pain     Gastric ulcer     Hyperaldosteronism ([facility] [address] 12/26/2017    under care of endo    Hypertension     per nephro-HTN center:Dr. Erick Parikh    Mixed hyperlipidemia 12/15/2017    Prediabetes     Tobacco use disorder            Social History     Tobacco Use    Smoking status: Current Every Day Smoker     Packs/day: 1.00     Years: 20.00     Pack years: 20.00     Types: Cigarettes    Smokeless tobacco: Never Used   Substance Use Topics    Alcohol use: Yes    Drug use: No     Social History     Substance and Sexual Activity   Drug Use No             Review of Systems   Constitutional: Negative for activity change, appetite change, chills, diaphoresis, fatigue, fever and unexpected weight change. HENT: Negative for congestion, dental problem, drooling, ear discharge, ear pain, facial swelling, hearing loss, mouth sores, nosebleeds, postnasal drip, rhinorrhea, sinus pressure, sinus pain, sneezing, sore throat, tinnitus, trouble swallowing and voice change. Eyes: Negative for photophobia, pain, discharge, redness, itching and visual disturbance. Respiratory: Negative for apnea, cough, choking, chest tightness, shortness of breath, wheezing and stridor. Cardiovascular: Negative for chest pain, palpitations and leg swelling. Gastrointestinal: Negative for abdominal distention, abdominal pain, anal bleeding, blood in stool, constipation, diarrhea, nausea, rectal pain and vomiting. Endocrine: Negative. Musculoskeletal: Negative for myalgias. Skin: Positive for rash. Negative for wound. Neurological: Negative for dizziness and light-headedness. Hematological: Negative for adenopathy. Objective:RR=17   Physical Exam  Constitutional:       Appearance: She is well-developed. She is not toxic-appearing. Comments: Note:exam was conducted with pt' either self-palpating or visually indicating via their device camera. HENT:      Head:      Comments: Mild audible nasal congestion noted. Mouth/Throat:      Mouth: Mucous membranes are moist.      Pharynx: Oropharynx is clear. Uvula midline. Comments:    Eyes:      General: No scleral icterus.      Conjunctiva/sclera: Conjunctivae normal.   Neck:      Comments: No neck LAD per self-palpation. Pulmonary:      Effort: Pulmonary effort is normal.      Comments: No audible wheezing/cough/sob. Abdominal:      Comments: Abdo exam:S,Non-tender per pt' self-palpation. Skin:     Coloration: Skin is not cyanotic. Findings: No rash. Comments: Singles rash in dermatomal pattern noted on right low side of back:no cellulitis. Neurological:      Mental Status: She is alert. Psychiatric:         Mood and Affect: Mood normal.         Behavior: Behavior is cooperative. Comments: Good eye contact. Polite. Assessment:          Diagnosis Orders   1. Herpes zoster without complication  VSS per limited vitals obtainable via virtual visit(VV)/well appearing. Possible med side effects reviewed. Pt' wishes to proceed w/med. Shingles vaccine advised after this resolves. valACYclovir (VALTREX) 1 g tablet    naproxen (NAPROSYN) 500 MG tablet               Plan:              Advised to go to local ER or call 911 for any worrisome signs/sx including but not limited to worsening of current complaint. If not improving then she will call back & will need in-person exam:pt' agreed. Call or return to clinic prn if these symptoms worsen or fail to improve as anticipated. Pt' ended call in good condition.     Tammy Macedo MD

## 2020-08-26 ENCOUNTER — TELEPHONE (OUTPATIENT)
Dept: FAMILY MEDICINE CLINIC | Age: 55
End: 2020-08-26

## 2020-08-26 NOTE — TELEPHONE ENCOUNTER
----- Message from Kareen Villarreal sent at 8/26/2020 11:00 AM EDT -----  Subject: Message to Provider    QUESTIONS  Information for Provider? Patient needs to speak to [facility] about her   spouse getting the shingles vaccine   she saw Dr. Codi Rangel yesterday   and was diagnosed with shingles. Her spouse needs the vaccine but   insurance won't cover it because he's 50. Pharmacies won't do it either.   ---------------------------------------------------------------------------  --------------  [address]  What is the best way for the office to contact you? OK to leave message on   voicemail  Preferred Call Back Phone Number? [phone]  ---------------------------------------------------------------------------  --------------  SCRIPT ANSWERS  Relationship to Patient?  Self

## 2020-10-02 RX ORDER — PANTOPRAZOLE SODIUM 20 MG/1
TABLET, DELAYED RELEASE ORAL
Qty: 30 TABLET | Refills: 0 | Status: SHIPPED | OUTPATIENT
Start: 2020-10-02 | End: 2020-11-04

## 2020-11-04 RX ORDER — PANTOPRAZOLE SODIUM 20 MG/1
TABLET, DELAYED RELEASE ORAL
Qty: 30 TABLET | Refills: 0 | Status: SHIPPED | OUTPATIENT
Start: 2020-11-04 | End: 2020-12-04

## 2020-11-10 PROBLEM — N17.9 AKI (ACUTE KIDNEY INJURY) (HCC): Status: ACTIVE | Noted: 2019-12-24

## 2020-11-10 PROBLEM — I10 BENIGN ESSENTIAL HYPERTENSION: Status: ACTIVE | Noted: 2019-12-24

## 2020-11-11 ENCOUNTER — OFFICE VISIT (OUTPATIENT)
Dept: ENDOCRINOLOGY | Age: 55
End: 2020-11-11
Payer: COMMERCIAL

## 2020-11-11 VITALS
OXYGEN SATURATION: 96 % | BODY MASS INDEX: 21.97 KG/M2 | HEART RATE: 77 BPM | HEIGHT: 63 IN | DIASTOLIC BLOOD PRESSURE: 78 MMHG | TEMPERATURE: 97.6 F | WEIGHT: 124 LBS | SYSTOLIC BLOOD PRESSURE: 137 MMHG

## 2020-11-11 DIAGNOSIS — E55.9 VITAMIN D DEFICIENCY: ICD-10-CM

## 2020-11-11 DIAGNOSIS — E27.8 ADRENAL NODULE (HCC): ICD-10-CM

## 2020-11-11 LAB
ANION GAP SERPL CALCULATED.3IONS-SCNC: 12 MMOL/L (ref 3–16)
BUN BLDV-MCNC: 12 MG/DL (ref 7–20)
CALCIUM SERPL-MCNC: 9.7 MG/DL (ref 8.3–10.6)
CHLORIDE BLD-SCNC: 103 MMOL/L (ref 99–110)
CO2: 27 MMOL/L (ref 21–32)
CREAT SERPL-MCNC: 0.9 MG/DL (ref 0.6–1.1)
GFR AFRICAN AMERICAN: >60
GFR NON-AFRICAN AMERICAN: >60
GLUCOSE BLD-MCNC: 99 MG/DL (ref 70–99)
POTASSIUM SERPL-SCNC: 4.7 MMOL/L (ref 3.5–5.1)
SODIUM BLD-SCNC: 142 MMOL/L (ref 136–145)
VITAMIN D 25-HYDROXY: 51.8 NG/ML

## 2020-11-11 PROCEDURE — 99214 OFFICE O/P EST MOD 30 MIN: CPT | Performed by: INTERNAL MEDICINE

## 2020-11-11 NOTE — PROGRESS NOTES
Patient ID:   Gerald Montgomery is a 54 y.o. female    Chief Complaint:   Gerald Montgomery is seen for an evaluation of bilateral adrenal nodules, hyperaldosteronism. Subjective:   Gerald Montgomery was admitted after cardiac arrest in Oct 2017. K was 1.9 and QT prolongation. LHC showed EF 25%. Nov 2017, Echo showed EF 60%. In July 2016 she had an admission for hypokalemia and hypertensive urgency. Renin and orville were suppressed. She did not follow after the ER visit. In Oct 2017 hospitalisations following collections were done. Nov 2nd 2017: Renin 0.1, Orville 4.2 with normal K. Nov 8th 2017: Renin 0.1, Orville 4.1, normal K. Nov 11th 2017: Renin 0.1, Orville 3.2, normal K.     CT Abd/Pelvis e contrast July 2016 showed bilateral adenomatous adrenal glands. Images reviewed, HU units in 40's with contrast.   CT Abd/Pelvis e contrast Nov 2017 showed left sided 1.2cms and right sided 1.5cms nodules. Ct abdomen for kidney stones - Feb 2020: Bilateral adrenal adenomata, measuring up to 1.6 cm on the right and 1.5 cm on the left, have not significantly changed. .       BP running well on ACE inhibitors, carvedilol   K normal on K replacement   Off lisinopril      Occasionally anxiety and lightheaded, somewhat better    Having muscle cramps. Not as bad anymore but still hurt     No kids, postmenopause   Family history of high blood pressure: Not aware. Dad had sudden cardiac death at age 80.    OTC meds: Denies   Denies illicit drugs       The following portions of the patient's history were reviewed and updated as appropriate:      Family History   Problem Relation Age of Onset    Heart Disease Mother     Allergy (Severe) Mother     Arthritis Mother     Coronary Art Dis Mother     High Cholesterol Mother     High Blood Pressure Mother     Mental Illness Mother     Obesity Mother     Heart Disease Father     Alcohol Abuse Father     Coronary Art Dis Father     Learning Disabilities Father     Anemia Sister    Mami Leavitt Asthma Sister     Cancer Sister     Early Death Sister    [facility] Hearing Loss Sister     Mental Illness Sister     Obesity Sister     Osteoporosis Sister     Asthma Brother          Social History     Socioeconomic History    Marital status:      Spouse name: Geetha Newman Number of children: Not on file    Years of education: Not on file    Highest education level: Not on file   Occupational History    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   Social Needs    Financial resource strain: Not on file    Food insecurity     Worry: Not on file     Inability: Not on file   [employer] needs     Medical: Not on file     Non-medical: Not on file   Tobacco Use    Smoking status: Current Every Day Smoker     Packs/day: 1.00     Years: 20.00     Pack years: 20.00     Types: Cigarettes    Smokeless tobacco: Never Used    Tobacco comment: trying to cut down    Substance and Sexual Activity    Alcohol use:  Yes    Drug use: No    Sexual activity: Yes     Partners: Male     Comment: -Eric: No children    Lifestyle    Physical activity     Days per week: Not on file     Minutes per session: Not on file    Stress: Not on file   Relationships    Social connections     Talks on phone: Not on file     Gets together: Not on file     Attends Bahai service: Not on file     Active member of club or organization: Not on file     Attends meetings of clubs or organizations: Not on file     Relationship status: Not on file    Intimate partner violence     Fear of current or ex partner: Not on file     Emotionally abused: Not on file     Physically abused: Not on file     Forced sexual activity: Not on file   Other Topics Concern    Not on file   Social History Narrative    Not on file         Past Medical History:   Diagnosis Date    Abdominal pain     ILAN (acute kidney injury) ([facility] [address] 12/24/2019    Cardiac arrest [facility]) 10/31/2017    Hospitalized [facility] hosp:cardiologist:Dr. Yoel Herbert    Eczema  Epigastric pain     Gastric ulcer     Hyperaldosteronism ([facility] [address] 12/26/2017    under care of endo    Hypertension     per nephro-HTN center:Dr. Botello    Mixed hyperlipidemia 12/15/2017    Prediabetes     Tobacco use disorder          Past Surgical History:   Procedure Laterality Date    CHOLECYSTECTOMY, LAPAROSCOPIC  10/10/2016    LAPAROSCOPIC CHOLECYSTECTOMY WITH CHOLANGIOGRAM    KNEE SURGERY      left torn meniscus repair    MANDIBLE SURGERY      SHOULDER SURGERY      SINUS SURGERY      TONSILLECTOMY      TUBAL LIGATION      UPPER GASTROINTESTINAL ENDOSCOPY  07/21/2016    WISDOM TOOTH EXTRACTION           Allergies   Allergen Reactions    Nuts [Peanut-Containing Drug Products] Anaphylaxis and Other (See Comments)     Stops breathing      Shellfish-Derived Products Anaphylaxis and Other (See Comments)     Stops breathing      Penicillins Hives    Lipitor [Atorvastatin Calcium] Other (See Comments)     Leg muscle pain/myalgias    Azithromycin Nausea And Vomiting    Erythromycin Nausea And Vomiting     GI upset         Current Outpatient Medications:     pantoprazole (PROTONIX) 20 MG tablet, TAKE 1 TABLET BY MOUTH EVERY DAY , Disp: 30 tablet, Rfl: 0    albuterol sulfate HFA (VENTOLIN HFA) 108 (90 Base) MCG/ACT inhaler, Inhale 1-2 puffs into the lungs every 6 hours as needed for Wheezing, Disp: 1 Inhaler, Rfl: 1    colesevelam (WELCHOL) 625 MG tablet, Take 1 tablet by mouth 2 times daily (with meals) For cholesterol, Disp: 180 tablet, Rfl: 0    D3-50 1.25 MG (88865 UT) CAPS, TAKE 1 CAPSULE BY MOUTH ONE TIME A WEEK , Disp: 4 capsule, Rfl: 5    ezetimibe (ZETIA) 10 MG tablet, TAKE 1 TABLET BY MOUTH ONE TIME A DAY , Disp: 30 tablet, Rfl: 2    loratadine (CLARITIN) 10 MG tablet, Take 10 mg by mouth daily, Disp: , Rfl:     potassium chloride (KLOR-CON M) 20 MEQ extended release tablet, Take 10 mEq by mouth daily, Disp: , Rfl:     carvedilol (COREG) 12.5 MG tablet, Take 12.5 mg by mouth 2 times daily (with meals), Disp: , Rfl:     Handicap Placard MISC, by Does not apply route Duration:5years, Disp: 1 each, Rfl: 0    folic acid (FOLVITE) 1 MG tablet, Take 1 tablet by mouth daily, Disp: 30 tablet, Rfl: 3    Review of Systems:    Constitutional: Negative for fever, chills, and unexpected weight change. HENT: Negative for congestion, ear pain, rhinorrhea,  sore throat and trouble swallowing. Eyes: Negative for photophobia, redness, itching. Respiratory: Negative for cough, shortness of breath and sputum. Cardiovascular: Negative for chest pain, palpitations and leg swelling. Gastrointestinal: Negative for nausea, vomiting, abdominal pain, diarrhea, constipation. Endocrine: Negative for cold intolerance, heat intolerance, polydipsia, polyphagia and polyuria. Genitourinary: Negative for dysuria, urgency, frequency, hematuria and flank pain. Musculoskeletal: Negative for myalgias, back pain, arthralgias and neck pain. Skin/Nail: Negative for rash, itching. Normal nails. Neurological: Negative for seizures, weakness, light-headedness, numbness and headaches. Hematological/ Lymph nodes: Negative for adenopathy. Does not bruise/bleed easily. Psychiatric/Behavioral: Negative for suicidal ideas, depression, anxiety, sleep disturbance and decreased concentration. Objective:   Physical Exam:    BP (!) 168/76 (Site: Right Upper Arm, Position: Sitting, Cuff Size: Medium Adult)   Pulse 77   Temp 97.6 °F (36.4 °C) (Temporal)   Ht 5' 3\" (1.6 m)   Wt 124 lb (56.2 kg)   LMP 08/19/2013 (LMP Unknown)   SpO2 96%   BMI 21.97 kg/m²       Constitutional: Patient is oriented to person, place, and time. Patient appears well-developed and well-nourished. HENT:    Head: Normocephalic and atraumatic. Eyes: Conjunctivae and EOM are normal.     Neck: Normal range of motion. Cardiovascular: Normal rate, regular rhythm and normal heart sounds.     Pulmonary/Chest: Effort normal and breath sounds normal.   Abdominal: Soft. Bowel sounds are normal.   Musculoskeletal: Normal range of motion. Neurological: Patient is alert and oriented to person, place, and time. Patient has normal reflexes. Skin: Skin is warm and dry. Psychiatric: Patient has a normal mood and affect.  Patient behavior is normal.     Lab Review:      Orders Only on 07/28/2020   Component Date Value Ref Range Status    Cholesterol, Total 07/28/2020 264* 0 - 199 mg/dL Final    Triglycerides 07/28/2020 160* 0 - 150 mg/dL Final    HDL 07/28/2020 80* 40 - 60 mg/dL Final    LDL Calculated 07/28/2020 152* <100 mg/dL Final    VLDL Cholesterol Calculated 07/28/2020 32  Not Established mg/dL Final    Sodium 07/28/2020 140  136 - 145 mmol/L Final    Potassium 07/28/2020 4.1  3.5 - 5.1 mmol/L Final    Chloride 07/28/2020 97* 99 - 110 mmol/L Final    CO2 07/28/2020 26  21 - 32 mmol/L Final    Anion Gap 07/28/2020 17* 3 - 16 Final    Glucose 07/28/2020 94  70 - 99 mg/dL Final    BUN 07/28/2020 8  7 - 20 mg/dL Final    CREATININE 07/28/2020 0.9  0.6 - 1.1 mg/dL Final    GFR Non- 07/28/2020 >60  >60 Final    GFR  07/28/2020 >60  >60 Final    Calcium 07/28/2020 9.5  8.3 - 10.6 mg/dL Final    Total Protein 07/28/2020 7.1  6.4 - 8.2 g/dL Final    Alb 07/28/2020 4.2  3.4 - 5.0 g/dL Final    Albumin/Globulin Ratio 07/28/2020 1.4  1.1 - 2.2 Final    Total Bilirubin 07/28/2020 0.3  0.0 - 1.0 mg/dL Final    Alkaline Phosphatase 07/28/2020 92  40 - 129 U/L Final    ALT 07/28/2020 7* 10 - 40 U/L Final    AST 07/28/2020 15  15 - 37 U/L Final    Globulin 07/28/2020 2.9  g/dL Final    Hemoglobin A1C 07/28/2020 5.5  See comment % Final    eAG 07/28/2020 111.2  mg/dL Final   Orders Only on 03/09/2020   Component Date Value Ref Range Status    Renin Activity 03/09/2020 3.0  ng/mL/hr Final   Orders Only on 03/09/2020   Component Date Value Ref Range Status    Aldosterone 03/09/2020 4.3  ng/dL Final Orders Only on 03/09/2020   Component Date Value Ref Range Status    Vit D, 25-Hydroxy 03/09/2020 56.1  >=30 ng/mL Final   Orders Only on 03/09/2020   Component Date Value Ref Range Status    PTH 03/09/2020 41.6  14.0 - 72.0 pg/mL Final   Orders Only on 03/03/2020   Component Date Value Ref Range Status    Protein, Ur 03/03/2020 <4.00  <12 mg/dL Final   Orders Only on 03/03/2020   Component Date Value Ref Range Status    Creatinine, Ur 03/03/2020 44.6  28.0 - 259.0 mg/dL Final   Orders Only on 03/03/2020   Component Date Value Ref Range Status    Sodium 03/03/2020 139  136 - 145 mmol/L Final    Potassium 03/03/2020 3.8  3.5 - 5.1 mmol/L Final    Chloride 03/03/2020 98* 99 - 110 mmol/L Final    CO2 03/03/2020 26  21 - 32 mmol/L Final    Anion Gap 03/03/2020 15  3 - 16 Final    Glucose 03/03/2020 102* 70 - 99 mg/dL Final    BUN 03/03/2020 7  7 - 20 mg/dL Final    CREATININE 03/03/2020 0.9  0.6 - 1.1 mg/dL Final    GFR Non- 03/03/2020 >60  >60 Final    GFR  03/03/2020 >60  >60 Final    Calcium 03/03/2020 9.2  8.3 - 10.6 mg/dL Final    Phosphorus 03/03/2020 3.1  2.5 - 4.9 mg/dL Final    Alb 03/03/2020 3.9  3.4 - 5.0 g/dL Final   Orders Only on 03/03/2020   Component Date Value Ref Range Status    Color, UA 03/03/2020 YELLOW  Straw/Yellow Final    Clarity, UA 03/03/2020 Clear  Clear Final    Glucose, Ur 03/03/2020 Negative  Negative mg/dL Final    Bilirubin Urine 03/03/2020 Negative  Negative Final    Ketones, Urine 03/03/2020 Negative  Negative mg/dL Final    Specific [place], UA 03/03/2020 1.005  1.005 - 1.030 Final    Blood, Urine 03/03/2020 Negative  Negative Final    pH, UA 03/03/2020 6.0  5.0 - 8.0 Final    Protein, UA 03/03/2020 Negative  Negative mg/dL Final    Urobilinogen, Urine 03/03/2020 0.2  <2.0 E.U./dL Final    Nitrite, Urine 03/03/2020 Negative  Negative Final    Leukocyte Esterase, Urine 03/03/2020 Negative  Negative Final    Microscopic Examination 03/03/2020 Not Indicated   Final    Urine Type 03/03/2020 not given   Final    WBC, UA 03/03/2020 0-2  0 - 5 /HPF Final    RBC, UA 03/03/2020 0-2  0 - 4 /HPF Final    Epithelial Cells, UA 03/03/2020 0-1  0 - 5 /HPF Final    Urinalysis Comments 03/03/2020 see below   Final   Orders Only on 03/03/2020   Component Date Value Ref Range Status    Cholesterol, Total 03/03/2020 224* 0 - 199 mg/dL Final    Triglycerides 03/03/2020 184* 0 - 150 mg/dL Final    HDL 03/03/2020 53  40 - 60 mg/dL Final    LDL Calculated 03/03/2020 134* <100 mg/dL Final    VLDL Cholesterol Calculated 03/03/2020 37  Not Established mg/dL Final    Sodium 03/03/2020 139  136 - 145 mmol/L Final    Potassium 03/03/2020 3.9  3.5 - 5.1 mmol/L Final    Chloride 03/03/2020 100  99 - 110 mmol/L Final    CO2 03/03/2020 24  21 - 32 mmol/L Final    Anion Gap 03/03/2020 15  3 - 16 Final    Glucose 03/03/2020 105* 70 - 99 mg/dL Final    BUN 03/03/2020 8  7 - 20 mg/dL Final    CREATININE 03/03/2020 1.0  0.6 - 1.1 mg/dL Final    GFR Non- 03/03/2020 58* >60 Final    GFR  03/03/2020 >60  >60 Final    Calcium 03/03/2020 9.3  8.3 - 10.6 mg/dL Final    Total Protein 03/03/2020 6.7  6.4 - 8.2 g/dL Final    Alb 03/03/2020 4.0  3.4 - 5.0 g/dL Final    Albumin/Globulin Ratio 03/03/2020 1.5  1.1 - 2.2 Final    Total Bilirubin 03/03/2020 0.3  0.0 - 1.0 mg/dL Final    Alkaline Phosphatase 03/03/2020 97  40 - 129 U/L Final    ALT 03/03/2020 11  10 - 40 U/L Final    AST 03/03/2020 16  15 - 37 U/L Final    Globulin 03/03/2020 2.7  g/dL Final   There may be more visits with results that are not included. Assessment and Plan       Tato Avalos was seen today for follow-up. Diagnoses and all orders for this visit:    Adrenal nodule ([facility] [address]  -     MRI ABDOMEN [address]; Future  -     Basic Metabolic Panel; Future    Vitamin D deficiency  -     Vitamin D 25 Hydroxy;  Future  -     Basic Metabolic Panel; Future    Smoking    Muscle cramps          1: Bilateral adrenal nodules with HTN and hypotkalemia   Likely benign and non functional     Work up normal for Renin, toby, metanephrines, Dex suppression test, thyroid testing Dec 2017     Nov 2018: MRI abdomen showed stable bilateral nodules. Work up normal for Renin, toby, metanephrines, Dex suppression test in 2018. Oct 2019: Normal DEX suppression test      Oct 2019 MRI abdomen: Stable bilateral small adrenal nodules both of which have imaging characteristics compatible with benign adenomas. No change when compared to previous CT exam from 2016.      MRI abdomen now     BP high today but usually run well     2: Vit D Def  62 Feb 2020   D3 50,000 units every other week    muscle pains, started before Zetia when she was taking atorvastatin   Muscle pain better but still there     3: Smoking   Counseling done to quit     Vit D today     MRI now     If Vit D os normal and MRI show stable adrenal nodules then RTC in one year         Electronically signed by Christiano Pierce MD on 11/11/2020 at 2:55 PM

## 2020-12-04 RX ORDER — EZETIMIBE 10 MG/1
TABLET ORAL
Qty: 30 TABLET | Refills: 0 | Status: SHIPPED | OUTPATIENT
Start: 2020-12-04 | End: 2021-01-05

## 2020-12-04 RX ORDER — PANTOPRAZOLE SODIUM 20 MG/1
TABLET, DELAYED RELEASE ORAL
Qty: 30 TABLET | Refills: 0 | Status: SHIPPED | OUTPATIENT
Start: 2020-12-04 | End: 2021-01-05

## 2021-01-05 DIAGNOSIS — K21.9 GASTROESOPHAGEAL REFLUX DISEASE WITHOUT ESOPHAGITIS: ICD-10-CM

## 2021-01-05 DIAGNOSIS — K52.9 ACUTE GASTROENTERITIS: ICD-10-CM

## 2021-01-05 DIAGNOSIS — R11.0 NAUSEA: ICD-10-CM

## 2021-01-05 DIAGNOSIS — E78.2 MIXED HYPERLIPIDEMIA: ICD-10-CM

## 2021-01-05 RX ORDER — EZETIMIBE 10 MG/1
TABLET ORAL
Qty: 30 TABLET | Refills: 0 | Status: SHIPPED | OUTPATIENT
Start: 2021-01-05 | End: 2021-01-11

## 2021-01-05 RX ORDER — PANTOPRAZOLE SODIUM 20 MG/1
TABLET, DELAYED RELEASE ORAL
Qty: 30 TABLET | Refills: 0 | Status: SHIPPED | OUTPATIENT
Start: 2021-01-05 | End: 2021-02-15

## 2021-01-09 DIAGNOSIS — E78.2 MIXED HYPERLIPIDEMIA: ICD-10-CM

## 2021-01-11 ENCOUNTER — TELEPHONE (OUTPATIENT)
Dept: ENDOCRINOLOGY | Age: 56
End: 2021-01-11

## 2021-01-11 DIAGNOSIS — E27.8 ADRENAL NODULE (HCC): Primary | ICD-10-CM

## 2021-01-11 RX ORDER — EZETIMIBE 10 MG/1
TABLET ORAL
Qty: 30 TABLET | Refills: 0 | Status: SHIPPED | OUTPATIENT
Start: 2021-01-11 | End: 2021-02-15

## 2021-01-11 NOTE — TELEPHONE ENCOUNTER
Jenifer Barrios from [facility] scheduling [phone] called to have a BUN and creatinine lab order placed in system prior to Patients upcoming MRI with and without contrast. Pt has High BP and needs labs drawn STAT before MRI.

## 2021-01-16 ENCOUNTER — NURSE TRIAGE (OUTPATIENT)
Dept: OTHER | Facility: CLINIC | Age: 56
End: 2021-01-16

## 2021-01-16 NOTE — TELEPHONE ENCOUNTER
Reason for Disposition   [1] COVID-19 infection suspected by caller or triager AND [2] mild symptoms (cough, fever, or others) AND [6] no complications or SOB    Answer Assessment - Initial Assessment Questions  1. COVID-19 DIAGNOSIS: \"Who made your Coronavirus (COVID-19) diagnosis? \" \"Was it confirmed by a positive lab test?\" If not diagnosed by a HCP, ask \"Are there lots of cases (community spread) where you live? \" (See public health department website, if unsure)       2. COVID-19 EXPOSURE: \"Was there any known exposure to COVID before the symptoms began? \" CDC Definition of close contact: within 6 feet (2 meters) for a total of 15 minutes or more over a 24-hour period. unsure  3. ONSET: \"When did the COVID-19 symptoms start? \"      X 2 days  4. WORST SYMPTOM: \"What is your worst symptom? \" (e.g., cough, fever, shortness of breath, muscle aches)     Body aches, chills  5. COUGH: \"Do you have a cough? \" If so, ask: \"How bad is the cough? \"      mild  6. FEVER: \"Do you have a fever? \" If so, ask: \"What is your temperature, how was it measured, and when did it start?\"      7. RESPIRATORY STATUS: \"Describe your breathing? \" (e.g., shortness of breath, wheezing, unable to speak)       8. BETTER-SAME-WORSE: Eather Rich you getting better, staying the same or getting worse compared to yesterday? \"  If getting worse, ask, \"In what way?\"      9. HIGH RISK DISEASE: \"Do you have any chronic medical problems? \" (e.g., asthma, heart or lung disease, weak immune system, obesity, etc.)       10. PREGNANCY: \"Is there any chance you are pregnant? \" \"When was your last menstrual period? \"       no  11. OTHER SYMPTOMS: \"Do you have any other symptoms? \"  (e.g., chills, fatigue, headache, loss of smell or taste, muscle pain, sore throat; new loss of smell or taste especially support the diagnosis of COVID-19)       denies    Protocols used: CORONAVIRUS (COVID-19) DIAGNOSED OR SUSPECTED-ADULT-AH  Pt c/o 2 day onset of body aches, chills,

## 2021-01-19 ENCOUNTER — HOSPITAL ENCOUNTER (OUTPATIENT)
Dept: MRI IMAGING | Age: 56
Discharge: HOME | End: 2021-01-19
Payer: COMMERCIAL

## 2021-01-19 ENCOUNTER — HOSPITAL ENCOUNTER (OUTPATIENT)
Age: 56
Discharge: HOME | End: 2021-01-19
Payer: COMMERCIAL

## 2021-01-19 DIAGNOSIS — E27.8 ADRENAL NODULE (HCC): ICD-10-CM

## 2021-01-19 LAB
BUN BLDV-MCNC: 8 MG/DL (ref 7–20)
CREAT SERPL-MCNC: 0.9 MG/DL (ref 0.6–1.1)
GFR AFRICAN AMERICAN: >60
GFR NON-AFRICAN AMERICAN: >60

## 2021-01-19 PROCEDURE — 36415 COLL VENOUS BLD VENIPUNCTURE: CPT

## 2021-01-19 PROCEDURE — 82565 ASSAY OF CREATININE: CPT

## 2021-01-19 PROCEDURE — 6360000004 HC RX CONTRAST MEDICATION: Performed by: INTERNAL MEDICINE

## 2021-01-19 PROCEDURE — 2580000003 HC RX 258: Performed by: INTERNAL MEDICINE

## 2021-01-19 PROCEDURE — A9577 INJ MULTIHANCE: HCPCS | Performed by: INTERNAL MEDICINE

## 2021-01-19 PROCEDURE — 84520 ASSAY OF UREA NITROGEN: CPT

## 2021-01-19 PROCEDURE — 74183 MRI ABD W/O CNTR FLWD CNTR: CPT

## 2021-01-19 RX ORDER — SODIUM CHLORIDE 9 MG/ML
INJECTION, SOLUTION INTRAVENOUS ONCE
Status: COMPLETED | OUTPATIENT
Start: 2021-01-19 | End: 2021-01-19

## 2021-01-19 RX ADMIN — GADOBENATE DIMEGLUMINE 11 ML: 529 INJECTION, SOLUTION INTRAVENOUS at 19:42

## 2021-01-19 RX ADMIN — SODIUM CHLORIDE: 9 INJECTION, SOLUTION INTRAVENOUS at 19:43

## 2021-01-21 ENCOUNTER — TELEPHONE (OUTPATIENT)
Dept: FAMILY MEDICINE CLINIC | Age: 56
End: 2021-01-21

## 2021-01-21 NOTE — TELEPHONE ENCOUNTER
----- Message from [facility] sent at 1/21/2021  9:57 AM EST -----  Subject: Message to Provider    QUESTIONS  Information for Provider? pt had mri kidney and the results are in and   they found mild spurring is seen on spine. . pt wants to know if it would   have anything to do with leg pain and would valente for the Dr Libia Klein or   Bo to call her   ---------------------------------------------------------------------------  --------------  [address]  What is the best way for the office to contact you? OK to leave message on   voicemail  Preferred Call Back Phone Number? [phone]  ---------------------------------------------------------------------------  --------------  SCRIPT ANSWERS  Relationship to Patient?  Self

## 2021-01-25 ENCOUNTER — VIRTUAL VISIT (OUTPATIENT)
Dept: FAMILY MEDICINE CLINIC | Age: 56
End: 2021-01-25
Payer: COMMERCIAL

## 2021-01-25 DIAGNOSIS — Z12.11 COLON CANCER SCREENING: ICD-10-CM

## 2021-01-25 DIAGNOSIS — E27.8 ADRENAL NODULE (HCC): ICD-10-CM

## 2021-01-25 DIAGNOSIS — Z12.31 ENCOUNTER FOR SCREENING MAMMOGRAM FOR MALIGNANT NEOPLASM OF BREAST: ICD-10-CM

## 2021-01-25 DIAGNOSIS — I10 ESSENTIAL HYPERTENSION: ICD-10-CM

## 2021-01-25 DIAGNOSIS — K21.9 GASTROESOPHAGEAL REFLUX DISEASE WITHOUT ESOPHAGITIS: ICD-10-CM

## 2021-01-25 DIAGNOSIS — M47.815 SPONDYLOSIS OF THORACOLUMBAR REGION WITHOUT MYELOPATHY OR RADICULOPATHY: Primary | ICD-10-CM

## 2021-01-25 DIAGNOSIS — R29.898 LEG FATIGUE: ICD-10-CM

## 2021-01-25 DIAGNOSIS — R73.03 PREDIABETES: ICD-10-CM

## 2021-01-25 DIAGNOSIS — E78.2 MIXED HYPERLIPIDEMIA: ICD-10-CM

## 2021-01-25 DIAGNOSIS — F17.200 TOBACCO USE DISORDER: ICD-10-CM

## 2021-01-25 PROCEDURE — 99214 OFFICE O/P EST MOD 30 MIN: CPT | Performed by: FAMILY MEDICINE

## 2021-01-25 ASSESSMENT — ENCOUNTER SYMPTOMS
NAUSEA: 0
CHOKING: 0
APNEA: 0
BACK PAIN: 0
ABDOMINAL DISTENTION: 0
ANAL BLEEDING: 0
COUGH: 0
CHEST TIGHTNESS: 0
BLOOD IN STOOL: 0
CONSTIPATION: 0
DIARRHEA: 0
ABDOMINAL PAIN: 0
RECTAL PAIN: 0
STRIDOR: 0
SHORTNESS OF BREATH: 0
WHEEZING: 0
VOMITING: 0

## 2021-01-25 ASSESSMENT — PATIENT HEALTH QUESTIONNAIRE - PHQ9
SUM OF ALL RESPONSES TO PHQ9 QUESTIONS 1 & 2: 0
SUM OF ALL RESPONSES TO PHQ QUESTIONS 1-9: 0

## 2021-01-25 NOTE — PROGRESS NOTES
Subjective:      Patient ID: Ishan Evans is a 54 y.o. female. Patient is  being evaluated by a Virtual Visit (video visit) encounter to address concerns as mentioned above. A caregiver was present when appropriate. Due to this being a TeleHealth encounter (During UIKEU-03 public health emergency), evaluation of the following organ systems was limited: Vitals/Constitutional/EENT/Resp/CV/GI//MS/Neuro/Skin/Heme-Lymph-Imm. Pursuant to the emergency declaration under the [address] [place] [address], [address] [place] [address] authority and the [employer] Act, this Virtual Visit was conducted with patient's (and/or legal guardian's) consent, to reduce the patient's risk of exposure to COVID-19 and provide necessary medical care. The patient (and/or legal guardian) has also been advised to contact this office for worsening conditions or problems, and seek emergency medical treatment and/or call 911 if deemed necessary. Services were provided through a video synchronous discussion virtually to substitute for in-person clinic visit. Patient and provider were located at their individual homes. \"THIS VISIT WAS COMPLETED VIRTUALLY USING DOXY. ME\"      Narrative   EXAMINATION:   MRI OF THE ABDOMEN WITHOUT AND WITH CONTRAST, 1/19/2021 6:33 pm       TECHNIQUE:   Multiplanar multisequence MRI of the abdomen was performed without and with   the administration of intravenous contrast.       COMPARISON:   CT scan 02/17/2020       MRI scan 10/23/2019       HISTORY:   ORDERING SYSTEM PROVIDED HISTORY: Adrenal nodule ([facility] [address]   TECHNOLOGIST PROVIDED HISTORY:   Specify organ?->Adrenals   Is the patient pregnant?->No   Reason for Exam: Check up on adrenal nodule   Inj. 11 ml of multihance  gfr   over 60 today  Prior here   Acuity: Acute   Type of Exam: Unknown       FINDINGS:   Left adrenal nodule measures 1.5 x 1.0 cm, similar to prior.     Right adrenal nodule measures 16 mm x 8 mm, similar to prior.       Both the right and left adrenal nodule loose signal on out of phase images.       No hydronephrosis on the right.  No hydronephrosis on left       No peripancreatic fluid       No intrahepatic ductal dilatation.  No perihepatic fluid       No significant small or large bowel distention noted       No suspicious enhancing mass on postcontrast images.       Mild spurring is seen in the spine.           Impression   Stable adrenal nodules, compatible with adenomas           Order History    Open Order Details   PACS Images     Show images for MRI ABDOMEN W WO CONTRAST   Results History Report    View Report   Associated Diagnoses    Adrenal nodule [facility])       External Result Report    External Result Report   Existing Charges  Charge Line Charge Status Charge Trigger Charge Type   [id number] [facility] Billing Imaging end exam Technical   [id number]             New problem:abnml MRI: ordered by endo regarding adrenal nodules which per result note from endo are stable. Pt' requesting 2nd opinion regarding the following details on the report:Mild spurring is seen in the spine. Seen by endo on 11/11/20:office note via EPIC reviewed today. Back pain:no. Has tired bilateral legs but no pain. Associated with nothing else. Smoker:yes. Denies BLE qomztzox-nzbzjjdguch-dbqondjal/urinary or bowel control loss or change/saddle anesthesia/gait abnormality. Hyperlipidemia; Taking zetia without side effects. Last labs 7/28/20:recent labs due. States she will address lipids with diet changes. Prediabetes:doing well. A1c=5.5 on 7/28/20. GERD f/u: Taking medication without side effects. Doing well. No new associated/worsening or other improving factors. Denies abdo pain/n-v/diarrhea/melena-blood in stool. HTN:Taking bp medication w/o side effects. No new associated or improving factors. Adds salt to food at the table:No.  Denies cp/sob/pnd/ankle edema/dizziness. Allergies   Allergen Reactions    Nuts [Peanut-Containing Drug Products] Anaphylaxis and Other (See Comments)     Stops breathing      Shellfish-Derived Products Anaphylaxis and Other (See Comments)     Stops breathing      Penicillins Hives    Lipitor [Atorvastatin Calcium] Other (See Comments)     Leg muscle pain/myalgias    Azithromycin Nausea And Vomiting    Erythromycin Nausea And Vomiting     GI upset       Current Outpatient Medications on File Prior to Visit   Medication Sig Dispense Refill    ezetimibe (ZETIA) 10 MG tablet TAKE 1 TABLET BY MOUTH EVERY DAY  30 tablet 0    pantoprazole (PROTONIX) 20 MG tablet TAKE 1 TABLET BY MOUTH EVERY DAY  30 tablet 0    D3-50 1.25 MG (85258 UT) CAPS TAKE 1 CAPSULE BY MOUTH ONE TIME A WEEK  13 capsule 3    albuterol sulfate HFA (VENTOLIN HFA) 108 (90 Base) MCG/ACT inhaler Inhale 1-2 puffs into the lungs every 6 hours as needed for Wheezing 1 Inhaler 1    colesevelam (WELCHOL) 625 MG tablet Take 1 tablet by mouth 2 times daily (with meals) For cholesterol 180 tablet 0    loratadine (CLARITIN) 10 MG tablet Take 10 mg by mouth daily      potassium chloride (KLOR-CON M) 20 MEQ extended release tablet Take 10 mEq by mouth daily      carvedilol (COREG) 12.5 MG tablet Take 12.5 mg by mouth 2 times daily (with meals)      Handicap Placard MISC by Does not apply route [id number] 1 each 0    folic acid (FOLVITE) 1 MG tablet Take 1 tablet by mouth daily 30 tablet 3     No current facility-administered medications on file prior to visit.         Past Medical History:   Diagnosis Date    Abdominal pain     ILAN (acute kidney injury) ([facility] [address] 12/24/2019    Cardiac arrest [facility]) 10/31/2017    Hospitalized [facility] hosp:cardiologist:Dr. Chapin    Eczema     Epigastric pain     Gastric ulcer     Hyperaldosteronism ([facility] [address] 12/26/2017 under care of endo    Hypertension     per nephro-HTN center:Dr. Botello    Mixed hyperlipidemia 12/15/2017    Prediabetes     Tobacco use disorder            Social History     Tobacco Use    Smoking status: Current Every Day Smoker     Packs/day: 1.00     Years: 20.00     Pack years: 20.00     Types: Cigarettes    Smokeless tobacco: Never Used    Tobacco comment: trying to cut down    Substance Use Topics    Alcohol use: Yes    Drug use: No     Social History     Substance and Sexual Activity   Drug Use No             Review of Systems   Constitutional: Negative for activity change, appetite change, chills, diaphoresis, fatigue, fever and unexpected weight change. Respiratory: Negative for apnea, cough, choking, chest tightness, shortness of breath, wheezing and stridor. Cardiovascular: Negative for chest pain, palpitations and leg swelling. Gastrointestinal: Negative for abdominal distention, abdominal pain, anal bleeding, blood in stool, constipation, diarrhea, nausea, rectal pain and vomiting. Endocrine: Negative for cold intolerance, heat intolerance, polydipsia, polyphagia and polyuria. Genitourinary: Negative for difficulty urinating. Musculoskeletal: Negative for arthralgias, back pain, gait problem, joint swelling and myalgias. Skin: Negative for rash. Neurological: Negative for dizziness and light-headedness. Hematological: Negative for adenopathy. Psychiatric/Behavioral: Negative for sleep disturbance. Objective:RR=17   Physical Exam  Constitutional:       Appearance: She is well-developed. She is not toxic-appearing. Comments: Note:exam was conducted with pt' either self-palpating or visually indicating via their device camera. HENT:      Head:      Comments: Mild audible nasal congestion noted. Mouth/Throat:      Mouth: Mucous membranes are moist.      Pharynx: Oropharynx is clear. Uvula midline.       Comments:    Eyes: General: No scleral icterus. Conjunctiva/sclera: Conjunctivae normal.   Neck:      Comments: No neck LAD per self-palpation. Pulmonary:      Effort: Pulmonary effort is normal.      Comments: No audible wheezing/cough/sob. Abdominal:      Comments: Abdo exam:S,Non-tender per pt' self-palpation. Skin:     Coloration: Skin is not cyanotic. Findings: No rash. Neurological:      Mental Status: She is alert. Psychiatric:         Mood and Affect: Mood normal.         Behavior: Behavior is cooperative. Comments: Good eye contact. Polite. Assessment:             Diagnosis Orders   1. Mild spine bone spurring per MRI abdo 1/2021  VSS per limited vitals obtainable via virtual visit(VV)/well appearing. Asymptomatic incidental finding. 2. Gastroesophageal reflux disease without esophagitis  Stable. 3. Essential hypertension  Stable. VL TYE BILATERAL LIMITED 1-2 LEVELS   4. Prediabetes  Stable. Labs due. VL TYE BILATERAL LIMITED 1-2 LEVELS   5. Mixed hyperlipidemia  Stable. Labs due. VL TYE BILATERAL LIMITED 1-2 LEVELS   6. Leg fatigue  May be overuse related but due to risk factors will check TYE. VL TYE BILATERAL LIMITED 1-2 LEVELS   7. Adrenal nodule (HCC)  Stable. Per endo. 8. Tobacco use disorder  Counseled. Strongly encouraged to quit. VL TYE BILATERAL LIMITED 1-2 LEVELS   9. Encounter for screening mammogram for malignant neoplasm of breast  Mammogram yrly advised. 10. Colon cancer screening  Colonoscopy per routine advised. Plan:       Obtain labs/diagnostic tests as discussed today & call back for results within 2-7days. Advised to go to local ER or call 911 for any worrisome signs/sx. Pt' ended call in good condition.     Dominick Cuevas MD

## 2021-02-13 DIAGNOSIS — E78.2 MIXED HYPERLIPIDEMIA: ICD-10-CM

## 2021-02-13 DIAGNOSIS — R11.0 NAUSEA: ICD-10-CM

## 2021-02-13 DIAGNOSIS — K52.9 ACUTE GASTROENTERITIS: ICD-10-CM

## 2021-02-13 DIAGNOSIS — K21.9 GASTROESOPHAGEAL REFLUX DISEASE WITHOUT ESOPHAGITIS: ICD-10-CM

## 2021-02-15 RX ORDER — EZETIMIBE 10 MG/1
TABLET ORAL
Qty: 30 TABLET | Refills: 0 | Status: SHIPPED | OUTPATIENT
Start: 2021-02-15 | End: 2021-03-17

## 2021-02-15 RX ORDER — PANTOPRAZOLE SODIUM 20 MG/1
TABLET, DELAYED RELEASE ORAL
Qty: 30 TABLET | Refills: 0 | Status: SHIPPED | OUTPATIENT
Start: 2021-02-15 | End: 2021-03-17

## 2021-02-22 ENCOUNTER — HOSPITAL ENCOUNTER (OUTPATIENT)
Dept: VASCULAR LAB | Age: 56
Discharge: HOME | End: 2021-02-22
Payer: COMMERCIAL

## 2021-02-22 DIAGNOSIS — I10 ESSENTIAL HYPERTENSION: ICD-10-CM

## 2021-02-22 DIAGNOSIS — R68.89 ABNORMAL ANKLE BRACHIAL INDEX: Primary | ICD-10-CM

## 2021-02-22 DIAGNOSIS — F17.200 TOBACCO USE DISORDER: ICD-10-CM

## 2021-02-22 DIAGNOSIS — E78.2 MIXED HYPERLIPIDEMIA: ICD-10-CM

## 2021-02-22 DIAGNOSIS — R29.898 LEG FATIGUE: ICD-10-CM

## 2021-02-22 DIAGNOSIS — R73.03 PREDIABETES: ICD-10-CM

## 2021-02-22 PROCEDURE — 93922 UPR/L XTREMITY ART 2 LEVELS: CPT

## 2021-03-08 ENCOUNTER — HOSPITAL ENCOUNTER (OUTPATIENT)
Dept: VASCULAR LAB | Age: 56
Discharge: HOME | End: 2021-03-08
Payer: COMMERCIAL

## 2021-03-08 DIAGNOSIS — R29.898 LEG FATIGUE: ICD-10-CM

## 2021-03-08 DIAGNOSIS — R68.89 ABNORMAL ANKLE BRACHIAL INDEX: ICD-10-CM

## 2021-03-08 DIAGNOSIS — F17.200 TOBACCO USE DISORDER: ICD-10-CM

## 2021-03-08 DIAGNOSIS — I10 ESSENTIAL HYPERTENSION: ICD-10-CM

## 2021-03-08 PROCEDURE — 93925 LOWER EXTREMITY STUDY: CPT

## 2021-03-17 DIAGNOSIS — E78.2 MIXED HYPERLIPIDEMIA: ICD-10-CM

## 2021-03-17 DIAGNOSIS — K21.9 GASTROESOPHAGEAL REFLUX DISEASE WITHOUT ESOPHAGITIS: ICD-10-CM

## 2021-03-17 DIAGNOSIS — R11.0 NAUSEA: ICD-10-CM

## 2021-03-17 DIAGNOSIS — K52.9 ACUTE GASTROENTERITIS: ICD-10-CM

## 2021-03-17 RX ORDER — PANTOPRAZOLE SODIUM 20 MG/1
TABLET, DELAYED RELEASE ORAL
Qty: 30 TABLET | Refills: 0 | Status: SHIPPED | OUTPATIENT
Start: 2021-03-17 | End: 2021-05-17

## 2021-03-17 RX ORDER — EZETIMIBE 10 MG/1
TABLET ORAL
Qty: 30 TABLET | Refills: 0 | Status: SHIPPED | OUTPATIENT
Start: 2021-03-17 | End: 2021-04-17

## 2021-03-23 ENCOUNTER — OFFICE VISIT (OUTPATIENT)
Dept: VASCULAR SURGERY | Age: 56
End: 2021-03-23
Payer: COMMERCIAL

## 2021-03-23 VITALS
TEMPERATURE: 97.2 F | HEIGHT: 63 IN | WEIGHT: 118.6 LBS | DIASTOLIC BLOOD PRESSURE: 82 MMHG | BODY MASS INDEX: 21.02 KG/M2 | SYSTOLIC BLOOD PRESSURE: 118 MMHG

## 2021-03-23 DIAGNOSIS — M79.604 PAIN IN BOTH LOWER EXTREMITIES: Primary | ICD-10-CM

## 2021-03-23 DIAGNOSIS — M79.605 PAIN IN BOTH LOWER EXTREMITIES: Primary | ICD-10-CM

## 2021-03-23 PROCEDURE — 99204 OFFICE O/P NEW MOD 45 MIN: CPT | Performed by: SURGERY

## 2021-03-23 NOTE — PROGRESS NOTES
[facility]  Consultation Note    Chief Complaint / Reason for Consultation  Pain in legs    History of Present Illness  Patient is a 54 y.o. female with history of hypertension, hyperlipidemia, adrenal mass, tobacco abuse, who presents with fatigue in both lower extremities. She states it occurs at rest and with change positions. Does not seem to worsen with activity. She denies any classic claudication. Denies any history of ulceration. Review of Systems     Denies fevers, chills, chest pain, shortness of breath, nausea, vomiting, hematemesis, diarrhea, constipation, melena, hematochezia, wt changes, vision problems, blindness, hearing problems, facial droop, slurred speech, extremity weakness, extremity numbness, dysuria.     Past Medical History:   Diagnosis Date    Abdominal pain     ILAN (acute kidney injury) ([facility] [address] 12/24/2019    Cardiac arrest [facility]) 10/31/2017    Hospitalized [facility] hosp:cardiologist:Dr. Luis Denise    Eczema     Epigastric pain     Gastric ulcer     Hyperaldosteronism ([facility] [address] 12/26/2017    under care of endo    Hypertension     per nephro-HTN center:Dr. Botello    Mixed hyperlipidemia 12/15/2017    Prediabetes     Tobacco use disorder        Past Surgical History:   Procedure Laterality Date    CHOLECYSTECTOMY, LAPAROSCOPIC  10/10/2016    LAPAROSCOPIC CHOLECYSTECTOMY WITH CHOLANGIOGRAM    KNEE SURGERY      left torn meniscus repair    MANDIBLE SURGERY      SHOULDER SURGERY      SINUS SURGERY      TONSILLECTOMY      TUBAL LIGATION      UPPER GASTROINTESTINAL ENDOSCOPY  07/21/2016    WISDOM TOOTH EXTRACTION         Allergies   Allergen Reactions    Nuts [Peanut-Containing Drug Products] Anaphylaxis and Other (See Comments)     Stops breathing      Shellfish-Derived Products Anaphylaxis and Other (See Comments)     Stops breathing      Penicillins Hives    Lipitor [Atorvastatin Calcium] Other (See Comments)     Leg muscle pain/myalgias  Azithromycin Nausea And Vomiting    Erythromycin Nausea And Vomiting     GI upset       Social History     Socioeconomic History    Marital status:      Spouse name: Chuy Madison Number of children: Not on file    Years of education: Not on file    Highest education level: Not on file   Occupational History    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   Social Needs    Financial resource strain: Not on file    Food insecurity     Worry: Not on file     Inability: Not on file   [employer] needs     Medical: Not on file     Non-medical: Not on file   Tobacco Use    Smoking status: Current Every Day Smoker     Packs/day: 1.00     Years: 20.00     Pack years: 20.00     Types: Cigarettes    Smokeless tobacco: Never Used    Tobacco comment: trying to cut down    Substance and Sexual Activity    Alcohol use:  Yes    Drug use: No    Sexual activity: Yes     Partners: Male     Comment: -Eric: No children    Lifestyle    Physical activity     Days per week: Not on file     Minutes per session: Not on file    Stress: Not on file   Relationships    Social connections     Talks on phone: Not on file     Gets together: Not on file     Attends Protestant service: Not on file     Active member of club or organization: Not on file     Attends meetings of clubs or organizations: Not on file     Relationship status: Not on file    Intimate partner violence     Fear of current or ex partner: Not on file     Emotionally abused: Not on file     Physically abused: Not on file     Forced sexual activity: Not on file   Other Topics Concern    Not on file   Social History Narrative    Not on file       Family History   Problem Relation Age of Onset    Heart Disease Mother     Allergy (Severe) Mother     Arthritis Mother     Coronary Art Dis Mother     High Cholesterol Mother     High Blood Pressure Mother     Mental Illness Mother     Obesity Mother     Heart Disease Father     Alcohol Abuse Father     Coronary Art Dis Father     Learning Disabilities Father     Anemia Sister     Asthma Sister     Cancer Sister     Early Death Sister    Oliverio Grace Hearing Loss Sister     Mental Illness Sister     Obesity Sister     Osteoporosis Sister     Asthma Brother      - No history of bleeding or clotting disorders    Vital Signs  Vitals:    03/23/21 1421   BP: 118/82   Temp: 97.2 °F (36.2 °C)   Weight: 118 lb 9.6 oz (53.8 kg)   Height: 5' 3\" (1.6 m)       Physical Examination  General:  no apparent distress  Psychiatric: affect appropriate  Extremities: warm and well perfused  - bilateral upper extremity motorsensory intact  - bilateral lower extremity motorsensory intact  Vascular exam:  - R femoral: 2  - L femoral: 2  - R DP: 2  - L DP: 2  - R PT: 2  - L PT: 2      Labs  Lab Results   Component Value Date    WBC 9.6 02/17/2020    HGB 14.6 02/17/2020    HCT 45.0 02/17/2020    MCV 93.0 02/17/2020     02/17/2020     Lab Results   Component Value Date     11/11/2020    K 4.7 11/11/2020     11/11/2020    CO2 27 11/11/2020    PHOS 3.1 03/03/2020    BUN 8 01/19/2021    CREATININE 0.9 01/19/2021      No components found for: GLU    Imaging:        Right Impression   Right TYE: 0.94. This is consistent with no significant PAD at rest   Ultrasound images of the right lower extremity reveal no evidence of a   hemodynamically significant stenosis. The waveforms are multiphasic throughout the right lower extremity. Ultrasound images of the right lower extremity reveal mild heterogeneous   plaque formation throughout. Right TYE: 0.94. This is consistent with no significant PAD at rest.   Ultrasound images of the right lower extremity reveal no evidence of a   hemodynamically significant stenosis. The waveforms are multiphasic throughout the right lower extremity. Ultrasound images of the right lower extremity reveal mild heterogeneous   plaque formation throughout.    Left Impression   Left TYE: 1.0. This is consistent with no significant PAD at rest.   Ultrasound images of the left lower extremity reveal no evidence of a   hemodynamically significant stenosis. The waveforms are multiphasic throughout the left lower extremity. Ultrasound images of the left lower extremity reveal mild heterogeneous plaque   formation throughout. Incidental finding: Cystic structure in left medial fossa measuring 2.7 x 0.7   x 2.0 cm. Assessment:   Lower extremity fatigue, not related to underlying arterial insufficiency  Hyperlipidemia  Hypertension  Tobacco abuse      Plan:  45-year-old female with lower extremity fatigue that I do not feel is related to underlying arterial insufficiency. She does have palpable femoral, popliteal and pedal pulses. Her noninvasive studies are reassuring. I review the studies with her today. Her symptoms occur primarily at rest and with change of positions. This also would not support an arterial diagnosis. I reviewed all this with her today and answered all of her questions. Unfortunately, I do not feel I have anything to offer her at this time. I instructed her to contact me with any additional questions      Ramirez Cosby M.D., FACS.  3/23/2021  2:26 PM

## 2021-04-16 DIAGNOSIS — E78.2 MIXED HYPERLIPIDEMIA: ICD-10-CM

## 2021-04-17 RX ORDER — EZETIMIBE 10 MG/1
TABLET ORAL
Qty: 30 TABLET | Refills: 0 | Status: SHIPPED | OUTPATIENT
Start: 2021-04-17 | End: 2021-05-17

## 2021-05-16 DIAGNOSIS — K52.9 ACUTE GASTROENTERITIS: ICD-10-CM

## 2021-05-16 DIAGNOSIS — K21.9 GASTROESOPHAGEAL REFLUX DISEASE WITHOUT ESOPHAGITIS: ICD-10-CM

## 2021-05-16 DIAGNOSIS — E78.2 MIXED HYPERLIPIDEMIA: ICD-10-CM

## 2021-05-16 DIAGNOSIS — R11.0 NAUSEA: ICD-10-CM

## 2021-05-17 RX ORDER — PANTOPRAZOLE SODIUM 20 MG/1
TABLET, DELAYED RELEASE ORAL
Qty: 30 TABLET | Refills: 0 | Status: SHIPPED | OUTPATIENT
Start: 2021-05-17 | End: 2021-06-15

## 2021-05-17 RX ORDER — EZETIMIBE 10 MG/1
TABLET ORAL
Qty: 30 TABLET | Refills: 0 | Status: SHIPPED | OUTPATIENT
Start: 2021-05-17 | End: 2021-05-24

## 2021-05-17 NOTE — TELEPHONE ENCOUNTER
From: Dariusz Ortega  To: Aren Tapia MD  Sent: 11/20/2019 2:49 PM CST  Subject: Non-Urgent Medical Question    Dr. Cathern Hatchet,     I have no appetite and sometimes when I do eat I feel sick to my stomach but have no vomiting.  Sometimes just thinking about eatin Last seen 1/25/21. Dr. No Underwood pt.

## 2021-06-15 DIAGNOSIS — R11.0 NAUSEA: ICD-10-CM

## 2021-06-15 DIAGNOSIS — K21.9 GASTROESOPHAGEAL REFLUX DISEASE WITHOUT ESOPHAGITIS: ICD-10-CM

## 2021-06-15 DIAGNOSIS — K52.9 ACUTE GASTROENTERITIS: ICD-10-CM

## 2021-06-15 RX ORDER — PANTOPRAZOLE SODIUM 20 MG/1
TABLET, DELAYED RELEASE ORAL
Qty: 30 TABLET | Refills: 0 | Status: SHIPPED | OUTPATIENT
Start: 2021-06-15 | End: 2021-07-15

## 2021-06-15 NOTE — TELEPHONE ENCOUNTER
Medication:   Requested Prescriptions     Pending Prescriptions Disp Refills    pantoprazole (PROTONIX) 20 MG tablet [Pharmacy Med Name: Pantoprazole Sodium Oral Tablet Delayed Release 20 MG] 30 tablet 0     Sig: TAKE 1 TABLET BY MOUTH EVERY DAY       Patient Phone Number: [phone] (home)     Last appt: 1/25/2021   Next appt: Visit date not found    Last OARRS: No flowsheet data found.

## 2021-07-15 DIAGNOSIS — K21.9 GASTROESOPHAGEAL REFLUX DISEASE WITHOUT ESOPHAGITIS: ICD-10-CM

## 2021-07-15 DIAGNOSIS — R11.0 NAUSEA: ICD-10-CM

## 2021-07-15 DIAGNOSIS — K52.9 ACUTE GASTROENTERITIS: ICD-10-CM

## 2021-07-15 RX ORDER — PANTOPRAZOLE SODIUM 20 MG/1
TABLET, DELAYED RELEASE ORAL
Qty: 30 TABLET | Refills: 0 | Status: SHIPPED | OUTPATIENT
Start: 2021-07-15 | End: 2021-08-16

## 2021-07-15 NOTE — TELEPHONE ENCOUNTER
Medication:   Requested Prescriptions     Pending Prescriptions Disp Refills    pantoprazole (PROTONIX) 20 MG tablet [Pharmacy Med Name: Pantoprazole Sodium Oral Tablet Delayed Release 20 MG] 30 tablet 0     Sig: TAKE 1 TABLET BY MOUTH EVERY DAY       Patient Phone Number: [phone] (home)     Last appt: 1/25/2021   Next appt: Visit date not found    Last OARRS: No flowsheet data found.

## 2021-08-14 DIAGNOSIS — R11.0 NAUSEA: ICD-10-CM

## 2021-08-14 DIAGNOSIS — K52.9 ACUTE GASTROENTERITIS: ICD-10-CM

## 2021-08-14 DIAGNOSIS — K21.9 GASTROESOPHAGEAL REFLUX DISEASE WITHOUT ESOPHAGITIS: ICD-10-CM

## 2021-08-16 RX ORDER — PANTOPRAZOLE SODIUM 20 MG/1
TABLET, DELAYED RELEASE ORAL
Qty: 30 TABLET | Refills: 0 | Status: SHIPPED | OUTPATIENT
Start: 2021-08-16 | End: 2021-09-16

## 2021-08-16 NOTE — TELEPHONE ENCOUNTER
Medication:   Requested Prescriptions     Pending Prescriptions Disp Refills    pantoprazole (PROTONIX) 20 MG tablet [Pharmacy Med Name: Pantoprazole Sodium Oral Tablet Delayed Release 20 MG] 30 tablet 0     Sig: TAKE 1 TABLET BY MOUTH EVERY DAY            Patient Phone Number: [phone] (home)     Last appt: 1/25/2021

## 2021-09-13 ENCOUNTER — TELEMEDICINE (OUTPATIENT)
Dept: FAMILY MEDICINE CLINIC | Age: 56
End: 2021-09-13
Payer: COMMERCIAL

## 2021-09-13 DIAGNOSIS — Z76.0 MEDICATION REFILL: ICD-10-CM

## 2021-09-13 DIAGNOSIS — J01.00 ACUTE MAXILLARY SINUSITIS, RECURRENCE NOT SPECIFIED: Primary | ICD-10-CM

## 2021-09-13 PROCEDURE — 99421 OL DIG E/M SVC 5-10 MIN: CPT | Performed by: NURSE PRACTITIONER

## 2021-09-13 RX ORDER — LORATADINE 10 MG/1
10 TABLET ORAL DAILY
Qty: 30 TABLET | Refills: 1 | Status: SHIPPED | OUTPATIENT
Start: 2021-09-13 | End: 2021-11-18

## 2021-09-13 RX ORDER — DOXYCYCLINE HYCLATE 100 MG
100 TABLET ORAL 2 TIMES DAILY
Qty: 14 TABLET | Refills: 0 | Status: SHIPPED | OUTPATIENT
Start: 2021-09-13 | End: 2021-09-20

## 2021-09-13 ASSESSMENT — PATIENT HEALTH QUESTIONNAIRE - PHQ9
SUM OF ALL RESPONSES TO PHQ QUESTIONS 1-9: 0
SUM OF ALL RESPONSES TO PHQ QUESTIONS 1-9: 0
2. FEELING DOWN, DEPRESSED OR HOPELESS: 0
1. LITTLE INTEREST OR PLEASURE IN DOING THINGS: 0
SUM OF ALL RESPONSES TO PHQ9 QUESTIONS 1 & 2: 0
SUM OF ALL RESPONSES TO PHQ QUESTIONS 1-9: 0

## 2021-09-13 NOTE — PROGRESS NOTES
2021    TELEHEALTH EVALUATION -- Audio/Visual (During BLGVU-03 public health emergency)    HPI:    Therese Milligan (:  1965) has requested an audio/video evaluation for the following concern(s):   She has had sinus symptoms that have  been going on for a week . She reports pressure Under eyes, bridge of nose, frontal and maxillary sinus. There is also pain. She has tried an OTC sudafed. Her eyes were swollen yesterday. She has been coughng 2 days. She has post nasal drip. Blows nose it is yellow. Will order doxycycline, due to allergies. She can use mucinex, delsym, drink hot tea, sinus rinse, cough drops,       Review of Systems   HENT: Positive for congestion, facial swelling, postnasal drip, rhinorrhea, sinus pressure and sinus pain. All other systems reviewed and are negative. Prior to Visit Medications    Medication Sig Taking?  Authorizing Provider   loratadine (CLARITIN) 10 MG tablet Take 1 tablet by mouth daily Yes CARLOS Shelton   doxycycline hyclate (VIBRA-TABS) 100 MG tablet Take 1 tablet by mouth 2 times daily for 7 days Yes CARLOS Hobson   pantoprazole (PROTONIX) 20 MG tablet TAKE 1 TABLET BY MOUTH EVERY DAY   CARLOS Hobson   ezetimibe (ZETIA) 10 MG tablet TAKE 1 TABLET BY MOUTH Chandra Cedeno MD   D3-50 1.25 MG (03127 UT) CAPS TAKE 1 CAPSULE BY MOUTH ONE TIME A WEEK   Patient taking differently: Krsitine Pretty MD   albuterol sulfate HFA (VENTOLIN HFA) 108 (90 Base) MCG/ACT inhaler Inhale 1-2 puffs into the lungs every 6 hours as needed for Wheezing  Joseph Garrido MD   colesevelam (WELCHOL) 625 MG tablet Take 1 tablet by mouth 2 times daily (with meals) For cholesterol  Joseph Garrido MD   potassium chloride (KLOR-CON M) 20 MEQ extended release tablet Take 10 mEq by mouth daily  Historical Provider, MD   carvedilol (COREG) 12.5 MG tablet Take 12.5 mg by mouth 2 times daily (with meals)  Historical Provider, MD   Handicap Placard MISC by Does not apply route Duration:5years  Bambi Hoffmann MD   folic acid (FOLVITE) 1 MG tablet Take 1 tablet by mouth daily  Bienvenido Stock MD       Social History     Tobacco Use    Smoking status: Current Every Day Smoker     Packs/day: 1.00     Years: 20.00     Pack years: 20.00     Types: Cigarettes    Smokeless tobacco: Never Used    Tobacco comment: trying to cut down    Vaping Use    Vaping Use: Never used   Substance Use Topics    Alcohol use: Yes    Drug use: No            PHYSICAL EXAMINATION:  [ INSTRUCTIONS:  \"[x]\" Indicates a positive item  \"[]\" Indicates a negative item  -- DELETE ALL ITEMS NOT EXAMINED]  Vital Signs: (As obtained by patient/caregiver or practitioner observation)    Blood pressure-  Heart rate-    Respiratory rate-    Temperature-  Pulse oximetry-     Constitutional: [x] Appears well-developed and well-nourished [] No apparent distress      [] Abnormal-   Mental status  [x] Alert and awake  [] Oriented to person/place/time []Able to follow commands      Eyes:  EOM    []  Normal  [] Abnormal-  Sclera  [x]  Normal  [] Abnormal -         Discharge []  None visible  [] Abnormal -    HENT:   [x] Normocephalic, atraumatic.   [] Abnormal   [x] Mouth/Throat: Mucous membranes are moist.     External Ears [] Normal  [] Abnormal-     Neck: [x] No visualized mass     Pulmonary/Chest: [] Respiratory effort normal.  [x] No visualized signs of difficulty breathing or respiratory distress        [] Abnormal-      Musculoskeletal:   [] Normal gait with no signs of ataxia         [x] Normal range of motion of neck        [] Abnormal-       Neurological:        [x] No Facial Asymmetry (Cranial nerve 7 motor function) (limited exam to video visit)          [] No gaze palsy        [] Abnormal-         Skin:        [x] No significant exanthematous lesions or discoloration noted on facial skin         [] Abnormal-            Psychiatric:       [x] Normal Affect [] No Hallucinations [] Abnormal-     Other pertinent observable physical exam findings-     ASSESSMENT/PLAN:  1. Acute maxillary sinusitis, recurrence not specified  Delsym, mucinex, cough drops, sinus rinse, and hot drinks to soothe  - doxycycline hyclate (VIBRA-TABS) 100 MG tablet; Take 1 tablet by mouth 2 times daily for 7 days  Dispense: 14 tablet; Refill: 0    2. Medication refill    - loratadine (CLARITIN) 10 MG tablet; Take 1 tablet by mouth daily  Dispense: 30 tablet; Refill: 1      No follow-ups on file. Ray Paz, was evaluated through a synchronous (real-time) audio-video encounter. The patient (or guardian if applicable) is aware that this is a billable service. Verbal consent to proceed has been obtained within the past 12 months. The visit was conducted pursuant to the emergency declaration under the [address] [place] [address], [address] [place] [address] authority and the [employer] and [employer] Act. Patient identification was verified, and a caregiver was present when appropriate. The patient was located in a state where the provider was credentialed to provide care. Total time spent on this encounter: [address] on 9/14/2021 at 3:01 PM    An electronic signature was used to authenticate this note.

## 2021-09-14 ASSESSMENT — ENCOUNTER SYMPTOMS
SINUS PAIN: 1
FACIAL SWELLING: 1
SINUS PRESSURE: 1
RHINORRHEA: 1

## 2021-09-15 DIAGNOSIS — K52.9 ACUTE GASTROENTERITIS: ICD-10-CM

## 2021-09-15 DIAGNOSIS — K21.9 GASTROESOPHAGEAL REFLUX DISEASE WITHOUT ESOPHAGITIS: ICD-10-CM

## 2021-09-15 DIAGNOSIS — R11.0 NAUSEA: ICD-10-CM

## 2021-09-15 NOTE — TELEPHONE ENCOUNTER
Medication:   Requested Prescriptions     Pending Prescriptions Disp Refills    pantoprazole (PROTONIX) 20 MG tablet [Pharmacy Med Name: Pantoprazole Sodium Oral Tablet Delayed Release 20 MG] 30 tablet 0     Sig: TAKE 1 TABLET BY MOUTH EVERY DAY        Patient Phone Number: [phone] (home)     Last appt: 9/13/2021   Next appt: Visit date not found    Last OARRS: No flowsheet data found.

## 2021-09-16 RX ORDER — PANTOPRAZOLE SODIUM 20 MG/1
TABLET, DELAYED RELEASE ORAL
Qty: 30 TABLET | Refills: 0 | Status: SHIPPED | OUTPATIENT
Start: 2021-09-16 | End: 2021-10-15

## 2021-10-15 DIAGNOSIS — K21.9 GASTROESOPHAGEAL REFLUX DISEASE WITHOUT ESOPHAGITIS: ICD-10-CM

## 2021-10-15 DIAGNOSIS — K52.9 ACUTE GASTROENTERITIS: ICD-10-CM

## 2021-10-15 DIAGNOSIS — R11.0 NAUSEA: ICD-10-CM

## 2021-10-15 DIAGNOSIS — E78.2 MIXED HYPERLIPIDEMIA: ICD-10-CM

## 2021-10-15 RX ORDER — PANTOPRAZOLE SODIUM 20 MG/1
TABLET, DELAYED RELEASE ORAL
Qty: 30 TABLET | Refills: 0 | Status: SHIPPED | OUTPATIENT
Start: 2021-10-15 | End: 2021-11-17

## 2021-10-15 RX ORDER — EZETIMIBE 10 MG/1
TABLET ORAL
Qty: 30 TABLET | Refills: 0 | Status: SHIPPED | OUTPATIENT
Start: 2021-10-15 | End: 2021-11-17

## 2021-11-11 ENCOUNTER — OFFICE VISIT (OUTPATIENT)
Dept: ENDOCRINOLOGY | Age: 56
End: 2021-11-11
Payer: COMMERCIAL

## 2021-11-11 VITALS
SYSTOLIC BLOOD PRESSURE: 132 MMHG | HEIGHT: 63 IN | DIASTOLIC BLOOD PRESSURE: 86 MMHG | HEART RATE: 80 BPM | RESPIRATION RATE: 14 BRPM | BODY MASS INDEX: 20.55 KG/M2 | WEIGHT: 116 LBS | OXYGEN SATURATION: 98 % | TEMPERATURE: 98 F

## 2021-11-11 DIAGNOSIS — E27.8 ADRENAL MASS (HCC): Primary | ICD-10-CM

## 2021-11-11 DIAGNOSIS — E55.9 VITAMIN D DEFICIENCY: ICD-10-CM

## 2021-11-11 LAB — VITAMIN D 25-HYDROXY: 57.6 NG/ML

## 2021-11-11 PROCEDURE — 99214 OFFICE O/P EST MOD 30 MIN: CPT | Performed by: INTERNAL MEDICINE

## 2021-11-11 NOTE — PROGRESS NOTES
Patient ID:   Sourav Hoff is a 64 y.o. female    Chief Complaint:   Sourav Hoff is seen for an evaluation of bilateral adrenal nodules, hyperaldosteronism. Subjective:   Sourav Hoff was admitted after cardiac arrest in Oct 2017. K was 1.9 and QT prolongation. LHC showed EF 25%. Nov 2017, Echo showed EF 60%. In July 2016 she had an admission for hypokalemia and hypertensive urgency. Renin and toby were suppressed. She did not follow after the ER visit. In Oct 2017 hospitalisations following collections were done. Nov 2nd 2017: Renin 0.1, Toby 4.2 with normal K. Nov 8th 2017: Renin 0.1, Toby 4.1, normal K. Nov 11th 2017: Renin 0.1, Toby 3.2, normal K.     CT Abd/Pelvis e contrast July 2016 showed bilateral adenomatous adrenal glands. Images reviewed, HU units in 40's with contrast.   CT Abd/Pelvis e contrast Nov 2017 showed left sided 1.2cms and right sided 1.5cms nodules. Ct abdomen for kidney stones - Feb 2020: Bilateral adrenal adenomata, measuring up to 1.6 cm on the right and 1.5 cm on the left, have not significantly changed. No kids, postmenopause   Family history of high blood pressure: Not aware. Dad had sudden cardiac death at age 80.    OTC meds: Denies   Denies illicit drugs     Interval history:     BP running well on carvedilol   K normal on K replacement   Off lisinopril      Occasionally anxiety and lightheaded, somewhat better    No muscle cramps     Vit D is once a week     The following portions of the patient's history were reviewed and updated as appropriate:      Family History   Problem Relation Age of Onset    Heart Disease Mother     Allergy (Severe) Mother     Arthritis Mother     Coronary Art Dis Mother     High Cholesterol Mother     High Blood Pressure Mother     Mental Illness Mother     Obesity Mother     Heart Disease Father     Alcohol Abuse Father     Coronary Art Dis Father     Learning Disabilities Father     Anemia Sister     Asthma Sister  Cancer Sister     Early Death Sister    Theodoro Milder Hearing Loss Sister     Mental Illness Sister     Obesity Sister     Osteoporosis Sister     Asthma Brother          Social History     Socioeconomic History    Marital status:      Spouse name: Geeta Laws Number of children: Not on file    Years of education: Not on file    Highest education level: Not on file   Occupational History    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   Tobacco Use    Smoking status: Current Every Day Smoker     Packs/day: 1.00     Years: 20.00     Pack years: 20.00     Types: Cigarettes    Smokeless tobacco: Never Used    Tobacco comment: trying to cut down    Vaping Use    Vaping Use: Never used   Substance and Sexual Activity    Alcohol use: Not Currently    Drug use: No    Sexual activity: Yes     Partners: Male     Comment: -Eric: No children    Other Topics Concern    Not on file   Social History Narrative    Not on file     Social Determinants of Health     Financial Resource Strain:     Difficulty of Paying Living Expenses: Not on file   Food Insecurity:     Worried About Running Out of Food in the Last Year: Not on file    Gene of Food in the Last Year: Not on file   Transportation Needs:     Lack of Transportation (Medical): Not on file    Lack of Transportation (Non-Medical):  Not on file   Physical Activity:     Days of Exercise per Week: Not on file    Minutes of Exercise per Session: Not on file   Stress:     Feeling of Stress : Not on file   Social Connections:     Frequency of Communication with Friends and Family: Not on file    Frequency of Social Gatherings with Friends and Family: Not on file    Attends Episcopal Services: Not on file    Active Member of Clubs or Organizations: Not on file    Attends Club or Organization Meetings: Not on file    Marital Status: Not on file   Intimate Partner Violence:     Fear of Current or Ex-Partner: Not on file    Emotionally Abused: Not on file    Physically Abused: Not on file    Sexually Abused: Not on file   Housing Stability:     Unable to Pay for Housing in the Last Year: Not on file    Number of Places Lived in the Last Year: Not on file    Unstable Housing in the Last Year: Not on file         Past Medical History:   Diagnosis Date    Abdominal pain     [facility] 12/24/2019    Cardiac arrest ([facility] 10/31/2017    [facility] hosp:cardiologist:Dr. Micki Daley    Eczema     Epigastric pain     Gastric ulcer     Hyperaldosteronism ([facility] 12/26/2017    under care of endo    Hypertension     per nephro-HTN center:Dr. Botello    Mixed hyperlipidemia 12/15/2017    Prediabetes     Tobacco use disorder          Past Surgical History:   Procedure Laterality Date    CHOLECYSTECTOMY, LAPAROSCOPIC  10/10/2016    LAPAROSCOPIC CHOLECYSTECTOMY WITH CHOLANGIOGRAM    KNEE SURGERY      left torn meniscus repair    MANDIBLE SURGERY      SHOULDER SURGERY      SINUS SURGERY      TONSILLECTOMY      TUBAL LIGATION      UPPER GASTROINTESTINAL ENDOSCOPY  07/21/2016    WISDOM TOOTH EXTRACTION           Allergies   Allergen Reactions    Nuts [Peanut-Containing Drug Products] Anaphylaxis and Other (See Comments)     Stops breathing      Shellfish-Derived Products Anaphylaxis and Other (See Comments)     Stops breathing      Penicillins Hives    Lipitor [Atorvastatin Calcium] Other (See Comments)     Leg muscle pain/myalgias    Azithromycin Nausea And Vomiting    Erythromycin Nausea And Vomiting     GI upset         Current Outpatient Medications:     ezetimibe (ZETIA) 10 MG tablet, TAKE 1 TABLET BY MOUTH EVERY DAY , Disp: 30 tablet, Rfl: 0    pantoprazole (PROTONIX) 20 MG tablet, TAKE 1 TABLET BY MOUTH EVERY DAY , Disp: 30 tablet, Rfl: 0    loratadine (CLARITIN) 10 MG tablet, Take 1 tablet by mouth daily, Disp: 30 tablet, Rfl: 1    D3-50 1.25 MG (96661 UT) CAPS, TAKE 1 CAPSULE BY MOUTH ONE TIME A WEEK , Disp: 13 capsule, Rfl: 3    albuterol sulfate HFA (VENTOLIN HFA) 108 (90 Base) MCG/ACT inhaler, Inhale 1-2 puffs into the lungs every 6 hours as needed for Wheezing, Disp: 1 Inhaler, Rfl: 1    potassium chloride (KLOR-CON M) 20 MEQ extended release tablet, Take 10 mEq by mouth daily, Disp: , Rfl:     carvedilol (COREG) 12.5 MG tablet, Take 12.5 mg by mouth 2 times daily (with meals), Disp: , Rfl:     Handicap Placard MISC, by Does not apply route Duration:5years, Disp: 1 each, Rfl: 0    folic acid (FOLVITE) 1 MG tablet, Take 1 tablet by mouth daily, Disp: 30 tablet, Rfl: 3    Review of Systems:    Constitutional: Negative for fever, chills, and unexpected weight change. HENT: Negative for congestion, ear pain, rhinorrhea,  sore throat and trouble swallowing. Eyes: Negative for photophobia, redness, itching. Respiratory: Negative for cough, shortness of breath and sputum. Cardiovascular: Negative for chest pain, palpitations and leg swelling. Gastrointestinal: Negative for nausea, vomiting, abdominal pain, diarrhea, constipation. Endocrine: Negative for cold intolerance, heat intolerance, polydipsia, polyphagia and polyuria. Genitourinary: Negative for dysuria, urgency, frequency, hematuria and flank pain. Musculoskeletal: Negative for myalgias, back pain, arthralgias and neck pain. Skin/Nail: Negative for rash, itching. Normal nails. Neurological: Negative for seizures, weakness, light-headedness, numbness and headaches. Hematological/ Lymph nodes: Negative for adenopathy. Does not bruise/bleed easily. Psychiatric/Behavioral: Negative for suicidal ideas, depression, anxiety, sleep disturbance and decreased concentration. Objective:   Physical Exam:    /86   Pulse 80   Temp 98 °F (36.7 °C)   Resp 14   Ht 5' 3\" (1.6 m)   Wt 116 lb (52.6 kg)   LMP 08/19/2013 (LMP Unknown)   SpO2 98%   BMI 20.55 kg/m²       Constitutional: Patient is oriented to person, place, and time. Muscle pain resolved     3: Smoking   Counseling done to quit     Vit D today     MRI in late summer 2022 (18 months from last one)     RTC in 9-10 months       Electronically signed by Joss Houston MD on 11/11/2021 at 2:53 PM

## 2021-11-17 DIAGNOSIS — K21.9 GASTROESOPHAGEAL REFLUX DISEASE WITHOUT ESOPHAGITIS: ICD-10-CM

## 2021-11-17 DIAGNOSIS — R11.0 NAUSEA: ICD-10-CM

## 2021-11-17 DIAGNOSIS — K52.9 ACUTE GASTROENTERITIS: ICD-10-CM

## 2021-11-17 DIAGNOSIS — E78.2 MIXED HYPERLIPIDEMIA: ICD-10-CM

## 2021-11-17 DIAGNOSIS — Z76.0 MEDICATION REFILL: ICD-10-CM

## 2021-11-17 DIAGNOSIS — R73.03 PREDIABETES: Primary | ICD-10-CM

## 2021-11-17 PROBLEM — J81.0 ACUTE PULMONARY EDEMA (HCC): Status: RESOLVED | Noted: 2017-11-06 | Resolved: 2021-11-17

## 2021-11-17 PROBLEM — J96.90 RESPIRATORY FAILURE (HCC): Status: RESOLVED | Noted: 2017-10-31 | Resolved: 2021-11-17

## 2021-11-17 PROBLEM — I46.9 CARDIAC ARREST (HCC): Status: RESOLVED | Noted: 2017-10-30 | Resolved: 2021-11-17

## 2021-11-17 RX ORDER — EZETIMIBE 10 MG/1
TABLET ORAL
Qty: 30 TABLET | Refills: 0 | Status: SHIPPED | OUTPATIENT
Start: 2021-11-17 | End: 2022-06-03 | Stop reason: ALTCHOICE

## 2021-11-17 RX ORDER — PANTOPRAZOLE SODIUM 20 MG/1
TABLET, DELAYED RELEASE ORAL
Qty: 30 TABLET | Refills: 0 | Status: SHIPPED | OUTPATIENT
Start: 2021-11-17 | End: 2022-06-03 | Stop reason: SDUPTHER

## 2021-11-17 NOTE — TELEPHONE ENCOUNTER
Medication:   Requested Prescriptions     Pending Prescriptions Disp Refills    pantoprazole (PROTONIX) 20 MG tablet [Pharmacy Med Name: Pantoprazole Sodium Oral Tablet Delayed Release 20 MG] 30 tablet 0     Sig: TAKE 1 TABLET BY MOUTH EVERY DAY    ezetimibe (Seretha Forth) 10 MG tablet [Pharmacy Med Name: Ezetimibe Oral Tablet 10 MG] 30 tablet 0     Sig: TAKE 1 TABLET BY MOUTH EVERY DAY            Patient Phone Number: [phone] (home)     Last appt: 9/13/2021

## 2021-11-18 RX ORDER — LORATADINE 10 MG/1
TABLET ORAL
Qty: 30 TABLET | Refills: 0 | Status: SHIPPED | OUTPATIENT
Start: 2021-11-18 | End: 2022-01-02 | Stop reason: SDUPTHER

## 2021-11-18 NOTE — TELEPHONE ENCOUNTER
Medication:   Requested Prescriptions     Pending Prescriptions Disp Refills    loratadine (CLARITIN) 10 MG tablet [Pharmacy Med Name: Loratadine Oral Tablet 10 MG] 30 tablet 0     Sig: TAKE 1 TABLET BY MOUTH EVERY DAY              Patient Phone Number: [phone] (home)     Last appt: 9/13/2021

## 2021-12-06 ENCOUNTER — TELEPHONE (OUTPATIENT)
Dept: FAMILY MEDICINE CLINIC | Age: 56
End: 2021-12-06

## 2021-12-06 NOTE — TELEPHONE ENCOUNTER
Spoke with pt's . He would like to see if Dr. Jenna Jordan would be willing to take pt on as a new patient. They would like to stay with the [place] office.   Please advise  Last OV:  9/13/2021

## 2021-12-17 DIAGNOSIS — K21.9 GASTROESOPHAGEAL REFLUX DISEASE WITHOUT ESOPHAGITIS: ICD-10-CM

## 2021-12-17 DIAGNOSIS — R11.0 NAUSEA: ICD-10-CM

## 2021-12-17 DIAGNOSIS — K52.9 ACUTE GASTROENTERITIS: ICD-10-CM

## 2021-12-17 DIAGNOSIS — E78.2 MIXED HYPERLIPIDEMIA: ICD-10-CM

## 2021-12-17 RX ORDER — EZETIMIBE 10 MG/1
TABLET ORAL
Qty: 30 TABLET | Refills: 0 | OUTPATIENT
Start: 2021-12-17

## 2021-12-17 RX ORDER — PANTOPRAZOLE SODIUM 20 MG/1
TABLET, DELAYED RELEASE ORAL
Qty: 30 TABLET | Refills: 0 | OUTPATIENT
Start: 2021-12-17

## 2021-12-17 NOTE — TELEPHONE ENCOUNTER
Medication:   Requested Prescriptions     Pending Prescriptions Disp Refills    pantoprazole (PROTONIX) 20 MG tablet [Pharmacy Med Name: Pantoprazole Sodium Oral Tablet Delayed Release 20 MG] 30 tablet 0     Sig: TAKE 1 TABLET BY MOUTH EVERY DAY    ezetimibe (Milagro Baumgarten) 10 MG tablet [Pharmacy Med Name: Ezetimibe Oral Tablet 10 MG] 30 tablet 0     Sig: TAKE 1 TABLET BY MOUTH EVERY DAY            Patient Phone Number: [phone] (home)     Last appt: 9/13/2021   Next appt: 12/17/2021

## 2021-12-20 NOTE — TELEPHONE ENCOUNTER
Pt's  states that pt was approved to see Dr. Siobhan Cho and will have her call back to schedule an appt

## 2022-01-02 DIAGNOSIS — Z76.0 MEDICATION REFILL: ICD-10-CM

## 2022-01-03 RX ORDER — LORATADINE 10 MG/1
10 TABLET ORAL DAILY
Qty: 30 TABLET | Refills: 0 | Status: SHIPPED | OUTPATIENT
Start: 2022-01-03 | End: 2022-06-03 | Stop reason: SDUPTHER

## 2022-01-03 NOTE — TELEPHONE ENCOUNTER
Medication:   Requested Prescriptions     Pending Prescriptions Disp Refills    loratadine (CLARITIN) 10 MG tablet 30 tablet 0        Last Filled:      Patient Phone Number: [phone] (home)     Last appt: 9/13/2021   Next appt: Visit date not found    Last OARRS: No flowsheet data found.

## 2022-05-14 DIAGNOSIS — K52.9 ACUTE GASTROENTERITIS: ICD-10-CM

## 2022-05-14 DIAGNOSIS — Z76.0 MEDICATION REFILL: ICD-10-CM

## 2022-05-14 DIAGNOSIS — K21.9 GASTROESOPHAGEAL REFLUX DISEASE WITHOUT ESOPHAGITIS: ICD-10-CM

## 2022-05-14 DIAGNOSIS — R11.0 NAUSEA: ICD-10-CM

## 2022-05-16 RX ORDER — LORATADINE 10 MG/1
TABLET ORAL
Qty: 30 TABLET | Refills: 0 | OUTPATIENT
Start: 2022-05-16

## 2022-05-16 RX ORDER — PANTOPRAZOLE SODIUM 20 MG/1
TABLET, DELAYED RELEASE ORAL
Qty: 30 TABLET | Refills: 0 | OUTPATIENT
Start: 2022-05-16

## 2022-06-01 DIAGNOSIS — I10 PRIMARY HYPERTENSION: Primary | ICD-10-CM

## 2022-06-01 DIAGNOSIS — E78.2 MIXED HYPERLIPIDEMIA: ICD-10-CM

## 2022-06-01 DIAGNOSIS — R73.03 PREDIABETES: ICD-10-CM

## 2022-06-01 DIAGNOSIS — I10 PRIMARY HYPERTENSION: ICD-10-CM

## 2022-06-01 LAB
A/G RATIO: 1.6 (ref 1.1–2.2)
ALBUMIN SERPL-MCNC: 4.2 G/DL (ref 3.4–5)
ALP BLD-CCNC: 96 U/L (ref 40–129)
ALT SERPL-CCNC: 10 U/L (ref 10–40)
ANION GAP SERPL CALCULATED.3IONS-SCNC: 13 MMOL/L (ref 3–16)
AST SERPL-CCNC: 19 U/L (ref 15–37)
BASOPHILS ABSOLUTE: 0.1 K/UL (ref 0–0.2)
BASOPHILS RELATIVE PERCENT: 1.2 %
BILIRUB SERPL-MCNC: 0.3 MG/DL (ref 0–1)
BUN BLDV-MCNC: 6 MG/DL (ref 7–20)
CALCIUM SERPL-MCNC: 9.9 MG/DL (ref 8.3–10.6)
CHLORIDE BLD-SCNC: 100 MMOL/L (ref 99–110)
CHOLESTEROL, TOTAL: 251 MG/DL (ref 0–199)
CO2: 24 MMOL/L (ref 21–32)
CREAT SERPL-MCNC: 0.9 MG/DL (ref 0.6–1.1)
EOSINOPHILS ABSOLUTE: 0.3 K/UL (ref 0–0.6)
EOSINOPHILS RELATIVE PERCENT: 3.2 %
GFR AFRICAN AMERICAN: >60
GFR NON-AFRICAN AMERICAN: >60
GLUCOSE BLD-MCNC: 90 MG/DL (ref 70–99)
HCT VFR BLD CALC: 48.8 % (ref 36–48)
HDLC SERPL-MCNC: 58 MG/DL (ref 40–60)
HEMOGLOBIN: 16.4 G/DL (ref 12–16)
LDL CHOLESTEROL CALCULATED: 169 MG/DL
LYMPHOCYTES ABSOLUTE: 2.8 K/UL (ref 1–5.1)
LYMPHOCYTES RELATIVE PERCENT: 35.1 %
MCH RBC QN AUTO: 31.7 PG (ref 26–34)
MCHC RBC AUTO-ENTMCNC: 33.6 G/DL (ref 31–36)
MCV RBC AUTO: 94.5 FL (ref 80–100)
MONOCYTES ABSOLUTE: 0.6 K/UL (ref 0–1.3)
MONOCYTES RELATIVE PERCENT: 7.1 %
NEUTROPHILS ABSOLUTE: 4.2 K/UL (ref 1.7–7.7)
NEUTROPHILS RELATIVE PERCENT: 53.4 %
PDW BLD-RTO: 14.4 % (ref 12.4–15.4)
PLATELET # BLD: 277 K/UL (ref 135–450)
PMV BLD AUTO: 8.2 FL (ref 5–10.5)
POTASSIUM SERPL-SCNC: 4.5 MMOL/L (ref 3.5–5.1)
RBC # BLD: 5.16 M/UL (ref 4–5.2)
SODIUM BLD-SCNC: 137 MMOL/L (ref 136–145)
TOTAL PROTEIN: 6.9 G/DL (ref 6.4–8.2)
TRIGL SERPL-MCNC: 119 MG/DL (ref 0–150)
TSH SERPL DL<=0.05 MIU/L-ACNC: 2.23 UIU/ML (ref 0.27–4.2)
VLDLC SERPL CALC-MCNC: 24 MG/DL
WBC # BLD: 8 K/UL (ref 4–11)

## 2022-06-01 PROCEDURE — 36415 COLL VENOUS BLD VENIPUNCTURE: CPT | Performed by: FAMILY MEDICINE

## 2022-06-02 LAB
ESTIMATED AVERAGE GLUCOSE: 108.3 MG/DL
HBA1C MFR BLD: 5.4 %

## 2022-06-02 SDOH — HEALTH STABILITY: PHYSICAL HEALTH
ON AVERAGE, HOW MANY DAYS PER WEEK DO YOU ENGAGE IN MODERATE TO STRENUOUS EXERCISE (LIKE A BRISK WALK)?: PATIENT DECLINED

## 2022-06-03 ENCOUNTER — OFFICE VISIT (OUTPATIENT)
Dept: FAMILY MEDICINE CLINIC | Age: 57
End: 2022-06-03
Payer: COMMERCIAL

## 2022-06-03 VITALS
TEMPERATURE: 97.4 F | SYSTOLIC BLOOD PRESSURE: 122 MMHG | RESPIRATION RATE: 16 BRPM | WEIGHT: 114.8 LBS | OXYGEN SATURATION: 98 % | BODY MASS INDEX: 20.34 KG/M2 | HEART RATE: 96 BPM | DIASTOLIC BLOOD PRESSURE: 64 MMHG | HEIGHT: 63 IN

## 2022-06-03 DIAGNOSIS — Z76.0 MEDICATION REFILL: ICD-10-CM

## 2022-06-03 DIAGNOSIS — M79.605 CHRONIC PAIN OF LOWER EXTREMITY, BILATERAL: ICD-10-CM

## 2022-06-03 DIAGNOSIS — K52.9 ACUTE GASTROENTERITIS: ICD-10-CM

## 2022-06-03 DIAGNOSIS — D75.1 ERYTHROCYTOSIS: ICD-10-CM

## 2022-06-03 DIAGNOSIS — M79.604 CHRONIC PAIN OF LOWER EXTREMITY, BILATERAL: ICD-10-CM

## 2022-06-03 DIAGNOSIS — R11.0 NAUSEA: ICD-10-CM

## 2022-06-03 DIAGNOSIS — F10.10 ETOH ABUSE: ICD-10-CM

## 2022-06-03 DIAGNOSIS — E78.5 HYPERLIPIDEMIA, UNSPECIFIED HYPERLIPIDEMIA TYPE: Primary | ICD-10-CM

## 2022-06-03 DIAGNOSIS — Z72.0 TOBACCO ABUSE: ICD-10-CM

## 2022-06-03 DIAGNOSIS — K21.9 GASTROESOPHAGEAL REFLUX DISEASE WITHOUT ESOPHAGITIS: ICD-10-CM

## 2022-06-03 DIAGNOSIS — D75.1 ERYTHROCYTOSIS: Primary | ICD-10-CM

## 2022-06-03 DIAGNOSIS — G89.29 CHRONIC PAIN OF LOWER EXTREMITY, BILATERAL: ICD-10-CM

## 2022-06-03 LAB
FERRITIN: 162.7 NG/ML (ref 15–150)
TRANSFERRIN: 257 MG/DL (ref 200–360)

## 2022-06-03 PROCEDURE — 99214 OFFICE O/P EST MOD 30 MIN: CPT | Performed by: FAMILY MEDICINE

## 2022-06-03 RX ORDER — ATORVASTATIN CALCIUM 10 MG/1
10 TABLET, FILM COATED ORAL DAILY
Qty: 90 TABLET | Refills: 3 | Status: SHIPPED | OUTPATIENT
Start: 2022-06-03

## 2022-06-03 RX ORDER — LORATADINE 10 MG/1
10 TABLET ORAL DAILY
Qty: 90 TABLET | Refills: 1 | Status: SHIPPED | OUTPATIENT
Start: 2022-06-03

## 2022-06-03 RX ORDER — LISINOPRIL 5 MG/1
5 TABLET ORAL NIGHTLY
COMMUNITY
Start: 2022-03-25 | End: 2022-09-21

## 2022-06-03 RX ORDER — PANTOPRAZOLE SODIUM 20 MG/1
TABLET, DELAYED RELEASE ORAL
Qty: 30 TABLET | Refills: 0 | Status: SHIPPED | OUTPATIENT
Start: 2022-06-03 | End: 2022-06-21

## 2022-06-03 ASSESSMENT — PATIENT HEALTH QUESTIONNAIRE - PHQ9
SUM OF ALL RESPONSES TO PHQ QUESTIONS 1-9: 0
SUM OF ALL RESPONSES TO PHQ9 QUESTIONS 1 & 2: 0
2. FEELING DOWN, DEPRESSED OR HOPELESS: 0
SUM OF ALL RESPONSES TO PHQ QUESTIONS 1-9: 0
1. LITTLE INTEREST OR PLEASURE IN DOING THINGS: 0
SUM OF ALL RESPONSES TO PHQ QUESTIONS 1-9: 0
SUM OF ALL RESPONSES TO PHQ QUESTIONS 1-9: 0

## 2022-06-03 NOTE — PROGRESS NOTES
Subjective:      Patient ID: Soto Kumar is a 64 y.o. female. Here to become established with me, pt with complex PMH. Hyperlipidemia  This is a chronic problem. The current episode started more than 1 year ago. Recent lipid tests were reviewed and are variable. Pertinent negatives include no chest pain, focal sensory loss, focal weakness, leg pain, myalgias or shortness of breath. Current antihyperlipidemic treatment includes statins. The current treatment provides moderate improvement of lipids. Compliance problems include adherence to exercise. Diarrhea   This is a new problem. The current episode started in the past 7 days. The problem occurs 2 to 4 times per day. The problem has been gradually worsening. The stool consistency is described as watery. Associated symptoms include abdominal pain (mild). Pertinent negatives include no arthralgias, bloating, chills, coughing, fever, headaches, increased  flatus, myalgias, sweats, URI, vomiting or weight loss. Associated symptoms comments: Nausea  . She has tried nothing for the symptoms. Nausea & Vomiting  This is a new problem. The current episode started in the past 7 days. The problem occurs intermittently. The problem has been waxing and waning. Associated symptoms include abdominal pain (mild), fatigue and nausea. Pertinent negatives include no arthralgias, chest pain, chills, coughing, fever, headaches, myalgias or vomiting. Nothing aggravates the symptoms. She has tried nothing for the symptoms. Gastroesophageal Reflux  She complains of abdominal pain (mild) and nausea. She reports no chest pain or no coughing. This is a chronic problem. The current episode started more than 1 year ago. The problem occurs occasionally. The problem has been unchanged. Associated symptoms include fatigue. Pertinent negatives include no weight loss. She has tried a PPI for the symptoms. The treatment provided significant relief.    Other  This is a chronic (abn cbc, high hh ) problem. The current episode started more than 1 year ago. The problem occurs constantly. The problem has been waxing and waning. Associated symptoms include abdominal pain (mild), fatigue and nausea. Pertinent negatives include no arthralgias, chest pain, chills, coughing, fever, headaches, myalgias or vomiting. Nothing aggravates the symptoms. She has tried nothing for the symptoms. Smoker: cutting down. ETOH use  Not ready to quit but start cutting down and stop soon         Review of Systems   Constitutional: Positive for fatigue. Negative for chills, fever and weight loss. Respiratory: Negative for cough and shortness of breath. Cardiovascular: Negative for chest pain. Gastrointestinal: Positive for abdominal pain (mild), diarrhea and nausea. Negative for bloating, flatus and vomiting. Musculoskeletal: Negative for arthralgias and myalgias. Neurological: Negative for focal weakness and headaches. Objective:  Blood pressure 122/64, pulse 96, temperature 97.4 °F (36.3 °C), temperature source Tympanic, resp. rate 16, height 5' 3\" (1.6 m), weight 114 lb 12.8 oz (52.1 kg), last menstrual period 08/19/2013, SpO2 98 %, not currently breastfeeding. Physical Exam  Vitals and nursing note reviewed. Constitutional:       General: She is not in acute distress. Appearance: Normal appearance. She is well-developed and normal weight. She is not ill-appearing or diaphoretic. HENT:      Head: Normocephalic. Right Ear: Tympanic membrane, ear canal and external ear normal. There is no impacted cerumen. Left Ear: Tympanic membrane, ear canal and external ear normal. There is no impacted cerumen. Eyes:      General: No scleral icterus. Extraocular Movements: Extraocular movements intact. Conjunctiva/sclera: Conjunctivae normal.      Pupils: Pupils are equal, round, and reactive to light. Neck:      Thyroid: No thyromegaly.       Vascular: No carotid bruit or JVD.      Comments: No carotid bruits  Cardiovascular:      Rate and Rhythm: Normal rate and regular rhythm. Pulses:           Carotid pulses are 2+ on the right side and 2+ on the left side. Heart sounds: Normal heart sounds. No murmur heard. No friction rub. No gallop. Comments: No edema    Pulmonary:      Effort: Pulmonary effort is normal. No respiratory distress. Breath sounds: No wheezing or rales. Chest:      Chest wall: No tenderness. Abdominal:      General: Abdomen is flat. Bowel sounds are normal. There is no distension. Palpations: Abdomen is soft. There is no mass. Tenderness: There is no abdominal tenderness. Musculoskeletal:      Cervical back: Normal range of motion and neck supple. Right lower leg: No edema. Left lower leg: No edema. Lymphadenopathy:      Cervical: No cervical adenopathy. Skin:     General: Skin is warm. Capillary Refill: Capillary refill takes less than 2 seconds. Coloration: Skin is not pale. Neurological:      General: No focal deficit present. Mental Status: She is alert and oriented to person, place, and time. Mental status is at baseline. Cranial Nerves: No cranial nerve deficit. Motor: No weakness or abnormal muscle tone. Gait: Gait normal.      Deep Tendon Reflexes: Reflexes are normal and symmetric. Psychiatric:         Mood and Affect: Mood normal.         Thought Content: Thought content normal.         History, recent test results, physicians notes reviewed for today's visit     Assessment:       Diagnosis Orders   1. Hyperlipidemia, unspecified hyperlipidemia type  atorvastatin (LIPITOR) 10 MG tablet   2. Medication refill  loratadine (CLARITIN) 10 MG tablet   3. Acute gastroenteritis  pantoprazole (PROTONIX) 20 MG tablet   4. Nausea  pantoprazole (PROTONIX) 20 MG tablet   5. Gastroesophageal reflux disease without esophagitis  pantoprazole (PROTONIX) 20 MG tablet   6.  Erythrocytosis KENAN - Shay Marks MD, Oncology, [facility]    Ferritin    Transferrin   7. ETOH abuse             Plan:      New patient   Lipids high  Start statin as recommended  Fu lab in 3 mo   Encourage compliance with medication, life style changes    BRAT diet,   Increase fluids  Patient to call if symptoms persist or worsen.      Referred to hematology   Check ferritin , tranferrin     encourage her to quit alcohol     Fu 3 mo            Hayley Fraga MD

## 2022-06-06 ASSESSMENT — ENCOUNTER SYMPTOMS
SHORTNESS OF BREATH: 0
BLOATING: 0
DIARRHEA: 1
NAUSEA: 1
FLATUS: 0
ABDOMINAL PAIN: 1
COUGH: 0
VOMITING: 0

## 2022-06-09 LAB
AMBIGUOUS ABBREVIATION: NORMAL
BUN / CREAT RATIO: 11 (ref 9–23)
BUN BLDV-MCNC: 9 MG/DL (ref 6–24)
CALCIUM SERPL-MCNC: 10 MG/DL (ref 8.7–10.2)
CHLORIDE BLD-SCNC: 98 MMOL/L (ref 96–106)
CO2: 21 MMOL/L (ref 20–29)
CREAT SERPL-MCNC: 0.79 MG/DL (ref 0.57–1)
ESTIMATED GLOMERULAR FILTRATION RATE CREATININE EQUATION: 88 ML/MIN/1.73
GLUCOSE BLD-MCNC: 92 MG/DL (ref 65–99)
POTASSIUM SERPL-SCNC: 4.2 MMOL/L (ref 3.5–5.2)
SODIUM BLD-SCNC: 141 MMOL/L (ref 134–144)

## 2022-06-10 ENCOUNTER — HOSPITAL ENCOUNTER (OUTPATIENT)
Dept: MRI IMAGING | Age: 57
Discharge: HOME | End: 2022-06-10
Payer: COMMERCIAL

## 2022-06-10 DIAGNOSIS — E27.8 ADRENAL MASS (HCC): ICD-10-CM

## 2022-06-10 PROCEDURE — 74183 MRI ABD W/O CNTR FLWD CNTR: CPT

## 2022-06-10 PROCEDURE — 6360000004 HC RX CONTRAST MEDICATION: Performed by: INTERNAL MEDICINE

## 2022-06-10 PROCEDURE — A9577 INJ MULTIHANCE: HCPCS | Performed by: INTERNAL MEDICINE

## 2022-06-10 PROCEDURE — 2580000003 HC RX 258: Performed by: INTERNAL MEDICINE

## 2022-06-10 RX ORDER — SODIUM CHLORIDE 9 MG/ML
INJECTION, SOLUTION INTRAVENOUS ONCE
Status: COMPLETED | OUTPATIENT
Start: 2022-06-10 | End: 2022-06-10

## 2022-06-10 RX ADMIN — SODIUM CHLORIDE: 9 INJECTION, SOLUTION INTRAVENOUS at 07:47

## 2022-06-10 RX ADMIN — GADOBENATE DIMEGLUMINE 10 ML: 529 INJECTION, SOLUTION INTRAVENOUS at 07:46

## 2022-06-18 DIAGNOSIS — K21.9 GASTROESOPHAGEAL REFLUX DISEASE WITHOUT ESOPHAGITIS: ICD-10-CM

## 2022-06-18 DIAGNOSIS — K52.9 ACUTE GASTROENTERITIS: ICD-10-CM

## 2022-06-18 DIAGNOSIS — R11.0 NAUSEA: ICD-10-CM

## 2022-06-21 RX ORDER — PANTOPRAZOLE SODIUM 20 MG/1
TABLET, DELAYED RELEASE ORAL
Qty: 30 TABLET | Refills: 3 | Status: SHIPPED | OUTPATIENT
Start: 2022-06-21

## 2022-07-05 ENCOUNTER — HOSPITAL ENCOUNTER (OUTPATIENT)
Age: 57
Setting detail: SPECIMEN
Discharge: HOME | End: 2022-07-05

## 2022-07-11 LAB
Lab: NORMAL
REPORT: NORMAL
THIS TEST SENT TO: NORMAL

## 2022-08-01 ENCOUNTER — TELEPHONE (OUTPATIENT)
Dept: FAMILY MEDICINE CLINIC | Age: 57
End: 2022-08-01

## 2022-08-01 DIAGNOSIS — Z00.00 WELL ADULT EXAM: Primary | ICD-10-CM

## 2022-08-01 NOTE — TELEPHONE ENCOUNTER
Pt called asking if she needs labs done before her appt here on 9-2-22.     Please advise    Last OV:  6-3-22    CB:  [phone]

## 2022-08-03 DIAGNOSIS — Z00.00 WELL ADULT EXAM: Primary | ICD-10-CM

## 2022-08-11 ENCOUNTER — OFFICE VISIT (OUTPATIENT)
Dept: ENDOCRINOLOGY | Age: 57
End: 2022-08-11
Payer: COMMERCIAL

## 2022-08-11 VITALS
DIASTOLIC BLOOD PRESSURE: 78 MMHG | HEART RATE: 92 BPM | OXYGEN SATURATION: 97 % | SYSTOLIC BLOOD PRESSURE: 153 MMHG | HEIGHT: 63 IN | BODY MASS INDEX: 20.48 KG/M2 | WEIGHT: 115.6 LBS

## 2022-08-11 DIAGNOSIS — E55.9 VITAMIN D DEFICIENCY: Primary | ICD-10-CM

## 2022-08-11 DIAGNOSIS — E55.9 VITAMIN D DEFICIENCY: ICD-10-CM

## 2022-08-11 DIAGNOSIS — E27.8 ADRENAL MASS (HCC): ICD-10-CM

## 2022-08-11 DIAGNOSIS — F17.200 SMOKING: ICD-10-CM

## 2022-08-11 LAB — VITAMIN D 25-HYDROXY: 58.2 NG/ML

## 2022-08-11 PROCEDURE — 99214 OFFICE O/P EST MOD 30 MIN: CPT | Performed by: INTERNAL MEDICINE

## 2022-08-11 NOTE — PROGRESS NOTES
Patient ID:   eKnia Pierce is a 64 y.o. female    Chief Complaint:   Kenia Pierce is seen for an evaluation of bilateral adrenal nodules, hyperaldosteronism. Subjective:   Kenia Pierce was admitted after cardiac arrest in Oct 2017. K was 1.9 and QT prolongation. LHC showed EF 25%. Nov 2017, Echo showed EF 60%. In July 2016 she had an admission for hypokalemia and hypertensive urgency. Renin and toby were suppressed. She did not follow after the ER visit. In Oct 2017 hospitalisations following collections were done. Nov 2nd 2017: Renin 0.1, Toby 4.2 with normal K. Nov 8th 2017: Renin 0.1, Toby 4.1, normal K. Nov 11th 2017: Renin 0.1, Toby 3.2, normal K.     CT Abd/Pelvis e contrast July 2016 showed bilateral adenomatous adrenal glands. Images reviewed, HU units in 40's with contrast.   CT Abd/Pelvis e contrast Nov 2017 showed left sided 1.2cms and right sided 1.5cms nodules. Ct abdomen for kidney stones - Feb 2020: Bilateral adrenal adenomata, measuring up to 1.6 cm on the right and 1.5 cm on the left, have not significantly changed. No kids, postmenopause   Family history of high blood pressure: Not aware. Dad had sudden cardiac death at age 80.    OTC meds: Denies   Denies illicit drugs     Interval history:      K normal on K replacement as per nephology   On lisinopril 5 mg daily     No episodes of anxiety, lightheadedness, palpitations, sweating     No muscle cramps     Vit D is once a week     Smoking 1 PPD     The following portions of the patient's history were reviewed and updated as appropriate:      Family History   Problem Relation Age of Onset    Heart Disease Mother     Allergy (Severe) Mother     Arthritis Mother     Coronary Art Dis Mother     High Cholesterol Mother     High Blood Pressure Mother     Mental Illness Mother     Obesity Mother     Heart Disease Father     Alcohol Abuse Father     Coronary Art Dis Father     Learning Disabilities Father     Anemia Sister Asthma Sister     Cancer Sister     Early Death Sister     Hearing Loss Sister     Mental Illness Sister     Obesity Sister     Osteoporosis Sister     Asthma Brother          Social History     Socioeconomic History    Marital status:      Spouse name: Eric    Number of children: Not on file    Years of education: Not on file    Highest education level: Not on file   Occupational History    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   Tobacco Use    Smoking status: Every Day     Packs/day: 1.00     Years: 20.00     Pack years: 20.00     Types: Cigarettes    Smokeless tobacco: Never    Tobacco comments:     trying to cut down    Vaping Use    Vaping Use: Never used   Substance and Sexual Activity    Alcohol use: Not Currently    Drug use: No    Sexual activity: Yes     Partners: Male     Comment: -Eric: No children    Other Topics Concern    Not on file   Social History Narrative    Not on file     Social Determinants of Health     Financial Resource Strain: Not on file   Food Insecurity: Not on file   Transportation Needs: Not on file   Physical Activity: Unknown    Days of Exercise per Week: Patient refused    Minutes of Exercise per Session: Not on file   Stress: Not on file   Social Connections: Not on file   Intimate Partner Violence: Not At Risk    Fear of Current or Ex-Partner: No    Emotionally Abused: No    Physically Abused: No    Sexually Abused: No   Housing Stability: Not on file         Past Medical History:   Diagnosis Date    Abdominal pain     Acute pulmonary edema ([address] 11/6/2017    ILAN (acute kidney injury) ([address] 12/24/2019    Cardiac arrest [facility]) 10/31/2017    Hospitalized [facility]:cardiologist:Dr. Chapin    Cardiac arrest [facility]) 10/30/2017    Cardiogenic shock ([address]     Coma with Dewey coma scale score not fully reported ([address]     Eczema     Epigastric pain     Gastric ulcer     Hyperaldosteronism ([address] 12/26/2017    under care of endo    Hypertension     per nephro-HTN center:Dr. Botello    Mixed hyperlipidemia 12/15/2017    Prediabetes     Respiratory failure [address] 10/31/2017    Tobacco use disorder          Past Surgical History:   Procedure Laterality Date    CHOLECYSTECTOMY, LAPAROSCOPIC  10/10/2016    LAPAROSCOPIC CHOLECYSTECTOMY WITH CHOLANGIOGRAM    KNEE SURGERY      left torn meniscus repair    MANDIBLE SURGERY      SHOULDER SURGERY      SINUS SURGERY      TONSILLECTOMY      TUBAL LIGATION      UPPER GASTROINTESTINAL ENDOSCOPY  07/21/2016    WISDOM TOOTH EXTRACTION           Allergies   Allergen Reactions    Nuts [Peanut-Containing Drug Products] Anaphylaxis and Other (See Comments)     Stops breathing      Shellfish-Derived Products Anaphylaxis and Other (See Comments)     Stops breathing      Penicillins Hives    Azithromycin Nausea And Vomiting    Erythromycin Nausea And Vomiting     GI upset         Current Outpatient Medications:     pantoprazole (PROTONIX) 20 MG tablet, TAKE 1 TABLET BY MOUTH EVERY DAY, Disp: 30 tablet, Rfl: 3    lisinopril (PRINIVIL;ZESTRIL) 5 MG tablet, Take 5 mg by mouth nightly, Disp: , Rfl:     loratadine (CLARITIN) 10 MG tablet, Take 1 tablet by mouth daily, Disp: 90 tablet, Rfl: 1    atorvastatin (LIPITOR) 10 MG tablet, Take 1 tablet by mouth daily, Disp: 90 tablet, Rfl: 3    D3-50 1.25 MG (31707 UT) CAPS, TAKE 1 CAPSULE BY MOUTH ONE TIME A WEEK, Disp: 13 capsule, Rfl: 3    albuterol sulfate HFA (VENTOLIN HFA) 108 (90 Base) MCG/ACT inhaler, Inhale 1-2 puffs into the lungs every 6 hours as needed for Wheezing, Disp: 1 Inhaler, Rfl: 1    potassium chloride (KLOR-CON M) 20 MEQ extended release tablet, Take 10 mEq by mouth daily, Disp: , Rfl:     Handicap Placard MISC, by Does not apply route Duration:5years, Disp: 1 each, Rfl: 0    folic acid (FOLVITE) 1 MG tablet, Take 1 tablet by mouth daily, Disp: 30 tablet, Rfl: 3    Review of Systems:    Constitutional: Negative for fever, chills, and unexpected weight change.    HENT: Negative for congestion, ear pain, rhinorrhea,  sore throat and trouble swallowing. Eyes: Negative for photophobia, redness, itching. Respiratory: Negative for cough, shortness of breath and sputum. Cardiovascular: Negative for chest pain, palpitations and leg swelling. Gastrointestinal: Negative for nausea, vomiting, abdominal pain, diarrhea, constipation. Endocrine: Negative for cold intolerance, heat intolerance, polydipsia, polyphagia and polyuria. Genitourinary: Negative for dysuria, urgency, frequency, hematuria and flank pain. Musculoskeletal: Negative for myalgias, back pain, arthralgias and neck pain. Skin/Nail: Negative for rash, itching. Normal nails. Neurological: Negative for seizures, weakness, light-headedness, numbness and headaches. Hematological/ Lymph nodes: Negative for adenopathy. Does not bruise/bleed easily. Psychiatric/Behavioral: Negative for suicidal ideas, depression, anxiety, sleep disturbance and decreased concentration. Objective:   Physical Exam:    BP (!) 149/76 (Site: Right Upper Arm, Position: Sitting, Cuff Size: Medium Adult)   Pulse 92   Ht 5' 3\" (1.6 m)   Wt 115 lb 9.6 oz (52.4 kg)   LMP 08/19/2013 (LMP Unknown)   SpO2 97%   BMI 20.48 kg/m²       Constitutional: Patient is oriented to person, place, and time. Patient appears well-developed and well-nourished. HENT:    Head: Normocephalic and atraumatic. Eyes: Conjunctivae and EOM are normal.     Neck: Normal range of motion. Cardiovascular: Normal rate, regular rhythm and normal heart sounds. Pulmonary/Chest: Effort normal and breath sounds normal.   Abdominal: Soft. Bowel sounds are normal.   Musculoskeletal: Normal range of motion. Neurological: Patient is alert and oriented to person, place, and time. Patient has normal reflexes. Skin: Skin is warm and dry. Psychiatric: Patient has a normal mood and affect.  Patient behavior is normal.     Lab Review:      Hospital Outpatient Visit on 07/05/2022   Component Date Value Ref Range Status    This Test Sent To 07/11/2022 Other (Specify)   Final    Report 07/11/2022 SEE IMAGE   Final    test code 07/11/2022 ref report   Final   Orders Only on 06/08/2022   Component Date Value Ref Range Status    Ambiguous Abbreviation 06/08/2022 Comment   Final   Orders Only on 06/08/2022   Component Date Value Ref Range Status    Glucose 06/08/2022 92  65 - 99 mg/dL Final    BUN 06/08/2022 9  6 - 24 mg/dL Final    Creatinine 06/08/2022 0.79  0.57 - 1.00 mg/dL Final    Estimated Glomerular Filtration Ra* 06/08/2022 88  >59 mL/min/1.73 Final    BUN/Creatinine Ratio 06/08/2022 11  9 - 23 Final    Sodium 06/08/2022 141  134 - 144 mmol/L Final    Potassium 06/08/2022 4.2  3.5 - 5.2 mmol/L Final    Chloride 06/08/2022 98  96 - 106 mmol/L Final    CO2 06/08/2022 21  20 - 29 mmol/L Final    Calcium 06/08/2022 10.0  8.7 - 10.2 mg/dL Final   Orders Only on 06/03/2022   Component Date Value Ref Range Status    Ferritin 06/01/2022 162.7 (A) 15.0 - 150.0 ng/mL Final    Transferrin 06/01/2022 257.0  200.0 - 360.0 mg/dL Final   Orders Only on 06/01/2022   Component Date Value Ref Range Status    WBC 06/01/2022 8.0  4.0 - 11.0 K/uL Final    RBC 06/01/2022 5.16  4.00 - 5.20 M/uL Final    Hemoglobin 06/01/2022 16.4 (A) 12.0 - 16.0 g/dL Final    Hematocrit 06/01/2022 48.8 (A) 36.0 - 48.0 % Final    MCV 06/01/2022 94.5  80.0 - 100.0 fL Final    MCH 06/01/2022 31.7  26.0 - 34.0 pg Final    MCHC 06/01/2022 33.6  31.0 - 36.0 g/dL Final    RDW 06/01/2022 14.4  12.4 - 15.4 % Final    Platelets 19/85/3579 277  135 - 450 K/uL Final    MPV 06/01/2022 8.2  5.0 - 10.5 fL Final    Neutrophils % 06/01/2022 53.4  % Final    Lymphocytes % 06/01/2022 35.1  % Final    Monocytes % 06/01/2022 7.1  % Final    Eosinophils % 06/01/2022 3.2  % Final    Basophils % 06/01/2022 1.2  % Final    Neutrophils Absolute 06/01/2022 4.2  1.7 - 7.7 K/uL Final    Lymphocytes Absolute 06/01/2022 2.8  1.0 - 5.1 K/uL Final Monocytes Absolute 06/01/2022 0.6  0.0 - 1.3 K/uL Final    Eosinophils Absolute 06/01/2022 0.3  0.0 - 0.6 K/uL Final    Basophils Absolute 06/01/2022 0.1  0.0 - 0.2 K/uL Final    Hemoglobin A1C 06/01/2022 5.4  See comment % Final    eAG 06/01/2022 108.3  mg/dL Final    TSH 06/01/2022 2.23  0.27 - 4.20 uIU/mL Final    Cholesterol, Total 06/01/2022 251 (A) 0 - 199 mg/dL Final    Triglycerides 06/01/2022 119  0 - 150 mg/dL Final    HDL 06/01/2022 58  40 - 60 mg/dL Final    LDL Calculated 06/01/2022 169 (A) <100 mg/dL Final    VLDL Cholesterol Calculated 06/01/2022 24  Not Established mg/dL Final    Sodium 06/01/2022 137  136 - 145 mmol/L Final    Potassium 06/01/2022 4.5  3.5 - 5.1 mmol/L Final    Chloride 06/01/2022 100  99 - 110 mmol/L Final    CO2 06/01/2022 24  21 - 32 mmol/L Final    Anion Gap 06/01/2022 13  3 - 16 Final    Glucose 06/01/2022 90  70 - 99 mg/dL Final    BUN 06/01/2022 6 (A) 7 - 20 mg/dL Final    Creatinine 06/01/2022 0.9  0.6 - 1.1 mg/dL Final    GFR Non- 06/01/2022 >60  >60 Final    GFR  06/01/2022 >60  >60 Final    Calcium 06/01/2022 9.9  8.3 - 10.6 mg/dL Final    Total Protein 06/01/2022 6.9  6.4 - 8.2 g/dL Final    Albumin 06/01/2022 4.2  3.4 - 5.0 g/dL Final    Albumin/Globulin Ratio 06/01/2022 1.6  1.1 - 2.2 Final    Total Bilirubin 06/01/2022 0.3  0.0 - 1.0 mg/dL Final    Alkaline Phosphatase 06/01/2022 96  40 - 129 U/L Final    ALT 06/01/2022 10  10 - 40 U/L Final    AST 06/01/2022 19  15 - 37 U/L Final   Orders Only on 11/11/2021   Component Date Value Ref Range Status    Vit D, 25-Hydroxy 11/11/2021 57.6  >=30 ng/mL Final           Assessment and Plan       David Jones was seen today for follow-up. Diagnoses and all orders for this visit:    Vitamin D deficiency  -     Vitamin D 25 Hydroxy;  Future    Adrenal mass (HCC)    Smoking          1: Bilateral adrenal nodules with HTN and hypotkalemia   Likely benign and non functional     Work up normal for Renin, toby, metanephrines, Dex suppression test, thyroid testing Dec 2017     Nov 2018: MRI abdomen showed stable bilateral nodules. Work up normal for Renin, toby, metanephrines, Dex suppression test in 2018. Oct 2019: Normal DEX suppression test      Oct 2019 MRI abdomen: Stable bilateral small adrenal nodules both of which have imaging characteristics compatible with benign adenomas. No change when compared to previous CT exam from 2016.      Jan 2021: MRI abdomen showed stable adrenal nodules      MRI abdomen June 2022: Bilateral adrenal adenomas have been stable andcharacterized on multiple prior studies dating back to 2019    Nodules are stable so will space out imaging   Some recent studies say no follow up needed if nodules are stable     I will consider repeat imaging with MRI in 2-3 years     Send a note to Dr. Giselle Hubbard     2: Vit D Def  62 - Nov 2021     D3 50,000 units every week     Muscle pain resolved     3: Smoking   Counseling done to quit        RTC in 12 months       Electronically signed by Vi Rodriguez MD on 8/11/2022 at 2:26 PM

## 2022-08-29 DIAGNOSIS — Z00.00 WELL ADULT EXAM: ICD-10-CM

## 2022-08-29 LAB
A/G RATIO: 1.5 (ref 1.1–2.2)
ALBUMIN SERPL-MCNC: 4.1 G/DL (ref 3.4–5)
ALP BLD-CCNC: 113 U/L (ref 40–129)
ALT SERPL-CCNC: 6 U/L (ref 10–40)
ANION GAP SERPL CALCULATED.3IONS-SCNC: 13 MMOL/L (ref 3–16)
AST SERPL-CCNC: 15 U/L (ref 15–37)
BASOPHILS ABSOLUTE: 0.1 K/UL (ref 0–0.2)
BASOPHILS RELATIVE PERCENT: 1 %
BILIRUB SERPL-MCNC: 0.5 MG/DL (ref 0–1)
BUN BLDV-MCNC: 10 MG/DL (ref 7–20)
CALCIUM SERPL-MCNC: 9.6 MG/DL (ref 8.3–10.6)
CHLORIDE BLD-SCNC: 101 MMOL/L (ref 99–110)
CHOLESTEROL, FASTING: 222 MG/DL (ref 0–199)
CO2: 26 MMOL/L (ref 21–32)
CREAT SERPL-MCNC: 0.9 MG/DL (ref 0.6–1.1)
EOSINOPHILS ABSOLUTE: 0.1 K/UL (ref 0–0.6)
EOSINOPHILS RELATIVE PERCENT: 1.6 %
GFR AFRICAN AMERICAN: >60
GFR NON-AFRICAN AMERICAN: >60
GLUCOSE BLD-MCNC: 89 MG/DL (ref 70–99)
HCT VFR BLD CALC: 41 % (ref 36–48)
HDLC SERPL-MCNC: 82 MG/DL (ref 40–60)
HEMOGLOBIN: 14 G/DL (ref 12–16)
HEPATITIS C ANTIBODY INTERPRETATION: NORMAL
LDL CHOLESTEROL CALCULATED: 116 MG/DL
LYMPHOCYTES ABSOLUTE: 2.1 K/UL (ref 1–5.1)
LYMPHOCYTES RELATIVE PERCENT: 28.2 %
MCH RBC QN AUTO: 30.9 PG (ref 26–34)
MCHC RBC AUTO-ENTMCNC: 34.2 G/DL (ref 31–36)
MCV RBC AUTO: 90.3 FL (ref 80–100)
MONOCYTES ABSOLUTE: 0.5 K/UL (ref 0–1.3)
MONOCYTES RELATIVE PERCENT: 7.2 %
NEUTROPHILS ABSOLUTE: 4.6 K/UL (ref 1.7–7.7)
NEUTROPHILS RELATIVE PERCENT: 62 %
PDW BLD-RTO: 14.1 % (ref 12.4–15.4)
PLATELET # BLD: 188 K/UL (ref 135–450)
PMV BLD AUTO: 8.1 FL (ref 5–10.5)
POTASSIUM SERPL-SCNC: 3.9 MMOL/L (ref 3.5–5.1)
RBC # BLD: 4.55 M/UL (ref 4–5.2)
SODIUM BLD-SCNC: 140 MMOL/L (ref 136–145)
TOTAL PROTEIN: 6.9 G/DL (ref 6.4–8.2)
TRIGLYCERIDE, FASTING: 118 MG/DL (ref 0–150)
TSH SERPL DL<=0.05 MIU/L-ACNC: 1.75 UIU/ML (ref 0.27–4.2)
VLDLC SERPL CALC-MCNC: 24 MG/DL
WBC # BLD: 7.4 K/UL (ref 4–11)

## 2022-08-29 PROCEDURE — 36415 COLL VENOUS BLD VENIPUNCTURE: CPT | Performed by: FAMILY MEDICINE

## 2022-08-30 LAB
ESTIMATED AVERAGE GLUCOSE: 119.8 MG/DL
HBA1C MFR BLD: 5.8 %
HIV AG/AB: NORMAL
HIV ANTIGEN: NORMAL
HIV-1 ANTIBODY: NORMAL
HIV-2 AB: NORMAL

## 2022-09-02 ENCOUNTER — OFFICE VISIT (OUTPATIENT)
Dept: FAMILY MEDICINE CLINIC | Age: 57
End: 2022-09-02
Payer: COMMERCIAL

## 2022-09-02 VITALS
HEART RATE: 88 BPM | HEIGHT: 63 IN | OXYGEN SATURATION: 97 % | TEMPERATURE: 97.6 F | BODY MASS INDEX: 20.68 KG/M2 | WEIGHT: 116.7 LBS | DIASTOLIC BLOOD PRESSURE: 70 MMHG | SYSTOLIC BLOOD PRESSURE: 110 MMHG | RESPIRATION RATE: 16 BRPM

## 2022-09-02 DIAGNOSIS — Z72.0 TOBACCO ABUSE: ICD-10-CM

## 2022-09-02 DIAGNOSIS — Z23 NEED FOR VACCINATION: ICD-10-CM

## 2022-09-02 DIAGNOSIS — E78.5 HYPERLIPIDEMIA, UNSPECIFIED HYPERLIPIDEMIA TYPE: Primary | ICD-10-CM

## 2022-09-02 DIAGNOSIS — I10 PRIMARY HYPERTENSION: ICD-10-CM

## 2022-09-02 PROCEDURE — 90677 PCV20 VACCINE IM: CPT | Performed by: FAMILY MEDICINE

## 2022-09-02 PROCEDURE — 90756 CCIIV4 VACC ABX FREE IM: CPT | Performed by: FAMILY MEDICINE

## 2022-09-02 PROCEDURE — 90471 IMMUNIZATION ADMIN: CPT | Performed by: FAMILY MEDICINE

## 2022-09-02 PROCEDURE — 90472 IMMUNIZATION ADMIN EACH ADD: CPT | Performed by: FAMILY MEDICINE

## 2022-09-02 PROCEDURE — 99214 OFFICE O/P EST MOD 30 MIN: CPT | Performed by: FAMILY MEDICINE

## 2022-09-02 ASSESSMENT — PATIENT HEALTH QUESTIONNAIRE - PHQ9
1. LITTLE INTEREST OR PLEASURE IN DOING THINGS: 0
SUM OF ALL RESPONSES TO PHQ QUESTIONS 1-9: 0
SUM OF ALL RESPONSES TO PHQ9 QUESTIONS 1 & 2: 0
2. FEELING DOWN, DEPRESSED OR HOPELESS: 0
SUM OF ALL RESPONSES TO PHQ QUESTIONS 1-9: 0

## 2022-09-02 ASSESSMENT — ENCOUNTER SYMPTOMS: SHORTNESS OF BREATH: 0

## 2022-09-02 NOTE — PROGRESS NOTES
Subjective:      Patient ID: Sam Salguero is a 64 y.o. female. Hypertension     denies  ha, visual changes, syncope, presyncope, cp, sob, palpitations, edema, torres, orthopnea, pnd,  Compliant with medication, no secondary effects         Hyperlipidemia  This is a chronic problem. The current episode started more than 1 month ago. Condition status: improving. She has no history of liver disease or obesity. Factors aggravating her hyperlipidemia include smoking. Pertinent negatives include no chest pain, focal sensory loss, focal weakness, leg pain, myalgias or shortness of breath. Current antihyperlipidemic treatment includes statins. The current treatment provides significant improvement of lipids. Compliance problems include adherence to exercise and adherence to diet. Risk factors for coronary artery disease include dyslipidemia, post-menopausal and a sedentary lifestyle. Smoker: 1ppd  Not ready to quit    Review of Systems   Respiratory:  Negative for shortness of breath. Cardiovascular:  Negative for chest pain. Musculoskeletal:  Negative for myalgias. Neurological:  Negative for focal weakness. Objective:  Blood pressure 110/70, pulse 88, temperature 97.6 °F (36.4 °C), temperature source Tympanic, resp. rate 16, height 5' 3\" (1.6 m), weight 116 lb 11.2 oz (52.9 kg), last menstrual period 08/19/2013, SpO2 97 %, not currently breastfeeding. Physical Exam  Vitals and nursing note reviewed. Constitutional:       General: She is not in acute distress. Appearance: Normal appearance. She is well-developed. She is not ill-appearing, toxic-appearing or diaphoretic. HENT:      Head: Normocephalic. Eyes:      General: No scleral icterus. Extraocular Movements: Extraocular movements intact. Conjunctiva/sclera: Conjunctivae normal.      Pupils: Pupils are equal, round, and reactive to light. Neck:      Thyroid: No thyromegaly. Vascular: No JVD.       Comments: No carotid bruits  Cardiovascular:      Rate and Rhythm: Normal rate and regular rhythm. Pulses:           Carotid pulses are 2+ on the right side and 2+ on the left side. Heart sounds: Normal heart sounds. No murmur heard. No friction rub. No gallop. Comments: No edema    Pulmonary:      Effort: Pulmonary effort is normal. No respiratory distress. Breath sounds: No wheezing or rales. Comments: Coarse bs    Chest:      Chest wall: No tenderness. Musculoskeletal:      Cervical back: Normal range of motion and neck supple. Lymphadenopathy:      Cervical: No cervical adenopathy. Skin:     General: Skin is warm. Neurological:      Mental Status: She is alert and oriented to person, place, and time. Cranial Nerves: No cranial nerve deficit. Motor: No abnormal muscle tone. Deep Tendon Reflexes: Reflexes are normal and symmetric. Recent blood work reviewed and interpreted by me for today's visit     Assessment:       Diagnosis Orders   1. Hyperlipidemia, unspecified hyperlipidemia type        2. Primary hypertension        3. Need for vaccination  Pneumococcal, PCV20, PREVNAR 21, (age 25 yrs+), IM, PF    Influenza, FLUCELVAX, (age 10 mo+), IM, Multi-Dose Vial, 0.5 mL      4. Tobacco abuse                Plan:      Improving  Continue lipitor  Re check labs in 6 mo   Diet discussed. 2. At goal   Continue same medications no changes needed at this time   Fu 6 mo     4. Smoking cessation was encouraged. Cessation techniques reviewed today.   Not ready to quit   Counseling >3 min     Vaccines pcv 20 and flu vaccine             Allyn Nielsen MD

## 2022-11-16 DIAGNOSIS — K52.9 ACUTE GASTROENTERITIS: ICD-10-CM

## 2022-11-16 DIAGNOSIS — R11.0 NAUSEA: ICD-10-CM

## 2022-11-16 DIAGNOSIS — K21.9 GASTROESOPHAGEAL REFLUX DISEASE WITHOUT ESOPHAGITIS: ICD-10-CM

## 2022-11-16 NOTE — TELEPHONE ENCOUNTER
Medication:   Requested Prescriptions     Pending Prescriptions Disp Refills    pantoprazole (PROTONIX) 20 MG tablet [Pharmacy Med Name: Pantoprazole Sodium Oral Tablet Delayed Release 20 MG] 30 tablet 0     Sig: TAKE 1 TABLET BY MOUTH EVERY DAY        Last Filled:      Patient Phone Number: [phone] (home)     Last appt: 9/2/2022   Next appt: 3/2/2023    Last OARRS: No flowsheet data found.

## 2022-11-18 RX ORDER — PANTOPRAZOLE SODIUM 20 MG/1
TABLET, DELAYED RELEASE ORAL
Qty: 30 TABLET | Refills: 0 | Status: SHIPPED | OUTPATIENT
Start: 2022-11-18

## 2022-12-07 ENCOUNTER — TELEPHONE (OUTPATIENT)
Dept: FAMILY MEDICINE CLINIC | Age: 57
End: 2022-12-07

## 2022-12-07 NOTE — TELEPHONE ENCOUNTER
Vv tomorrow at 230     [facility] severe sx [facility]   Check a home covid test before apt please    She can try otc med for cold sx

## 2022-12-07 NOTE — TELEPHONE ENCOUNTER
Pt called c/o chest congestion/tightness, cough X 3 days. She has Not taken a Covid test, thinks she just has a bad cold.   She would like to be seen or Rx called in    Please advise    Last OV:  9-2-22

## 2022-12-20 DIAGNOSIS — K21.9 GASTROESOPHAGEAL REFLUX DISEASE WITHOUT ESOPHAGITIS: ICD-10-CM

## 2022-12-20 DIAGNOSIS — K52.9 ACUTE GASTROENTERITIS: ICD-10-CM

## 2022-12-20 DIAGNOSIS — R11.0 NAUSEA: ICD-10-CM

## 2022-12-20 RX ORDER — PANTOPRAZOLE SODIUM 20 MG/1
TABLET, DELAYED RELEASE ORAL
Qty: 30 TABLET | Refills: 3 | Status: SHIPPED | OUTPATIENT
Start: 2022-12-20

## 2022-12-20 NOTE — TELEPHONE ENCOUNTER
Medication:   Requested Prescriptions     Pending Prescriptions Disp Refills    pantoprazole (PROTONIX) 20 MG tablet [Pharmacy Med Name: Pantoprazole Sodium Oral Tablet Delayed Release 20 MG] 30 tablet 0     Sig: TAKE 1 TABLET BY MOUTH EVERY DAY        Last Filled:      Patient Phone Number: [phone] (home)     Last appt: 9/2/2022   Next appt: 3/2/2023    Last OARRS: No flowsheet data found.

## 2023-01-20 DIAGNOSIS — E55.9 VITAMIN D DEFICIENCY: ICD-10-CM

## 2023-01-20 DIAGNOSIS — Z76.0 MEDICATION REFILL: ICD-10-CM

## 2023-01-20 RX ORDER — METHOCARBAMOL 750 MG/1
TABLET ORAL
Qty: 13 CAPSULE | Refills: 1 | Status: SHIPPED | OUTPATIENT
Start: 2023-01-20

## 2023-01-20 NOTE — TELEPHONE ENCOUNTER
Medication:   Requested Prescriptions     Pending Prescriptions Disp Refills    loratadine (CLARITIN) 10 MG tablet [Pharmacy Med Name: Loratadine Oral Tablet 10 MG] 90 tablet 0     Sig: TAKE 1 TABLET BY MOUTH DAILY        Last Filled:      Patient Phone Number: [phone] (home)     Last appt: 9/2/2022   Next appt: 3/2/2023    Last OARRS: No flowsheet data found.

## 2023-01-20 NOTE — TELEPHONE ENCOUNTER
Medication:   Requested Prescriptions     Pending Prescriptions Disp Refills    D3-50 1.25 MG (33856 UT) CAPS [Pharmacy Med Name: D3-50 Oral Capsule 1.25 MG (78832 UT)] 13 capsule 0     Sig: TAKE 1 CAPSULE BY MOUTH ONE TIME A WEEK       Last Filled:  1/17/22    Patient Phone Number: [phone] (home)     Last appt: 8/11/2022   Next appt: 8/10/23    Last Labs DM:   Lab Results   Component Value Date/Time    VITD25 58.2 08/11/2022 03:04 PM    VITD25 57.6 11/11/2021 03:12 PM

## 2023-01-23 RX ORDER — LORATADINE 10 MG/1
10 TABLET ORAL DAILY
Qty: 90 TABLET | Refills: 0 | Status: SHIPPED | OUTPATIENT
Start: 2023-01-23

## 2023-02-01 ENCOUNTER — TELEPHONE (OUTPATIENT)
Dept: FAMILY MEDICINE CLINIC | Age: 58
End: 2023-02-01

## 2023-02-01 NOTE — TELEPHONE ENCOUNTER
----- Message from Jaspal Andres sent at 2/1/2023  8:56 AM EST -----  Subject: Message to Provider    QUESTIONS  Information for Provider? Pt is interested in quitting smoking and would   like a nicotine patch called into her pharmacy. [facility] [phone]  ---------------------------------------------------------------------------  --------------  Mariam DOZIER  [phone]; OK to leave message on voicemail  ---------------------------------------------------------------------------  --------------  SCRIPT ANSWERS  Relationship to Patient?  Self

## 2023-02-02 RX ORDER — NICOTINE 21 MG/24HR
1 PATCH, TRANSDERMAL 24 HOURS TRANSDERMAL EVERY 24 HOURS
Qty: 30 PATCH | Refills: 1 | Status: SHIPPED | OUTPATIENT
Start: 2023-02-02 | End: 2024-02-02

## 2023-02-02 NOTE — TELEPHONE ENCOUNTER
rx transmitted electronically to the pharmacy via [employer]   Let patient know and verify pharmacy    Seh can check website:   Alejandro Frederick

## 2023-03-02 ENCOUNTER — OFFICE VISIT (OUTPATIENT)
Dept: FAMILY MEDICINE CLINIC | Age: 58
End: 2023-03-02
Payer: COMMERCIAL

## 2023-03-02 VITALS
SYSTOLIC BLOOD PRESSURE: 132 MMHG | BODY MASS INDEX: 20.89 KG/M2 | OXYGEN SATURATION: 98 % | DIASTOLIC BLOOD PRESSURE: 80 MMHG | RESPIRATION RATE: 16 BRPM | TEMPERATURE: 97.3 F | HEIGHT: 63 IN | WEIGHT: 117.9 LBS | HEART RATE: 91 BPM

## 2023-03-02 DIAGNOSIS — Z91.018 FOOD ALLERGY: Primary | ICD-10-CM

## 2023-03-02 DIAGNOSIS — Z91.018 FOOD ALLERGY: ICD-10-CM

## 2023-03-02 DIAGNOSIS — Z12.31 ENCOUNTER FOR SCREENING MAMMOGRAM FOR MALIGNANT NEOPLASM OF BREAST: ICD-10-CM

## 2023-03-02 DIAGNOSIS — D75.1 ERYTHROCYTOSIS: ICD-10-CM

## 2023-03-02 DIAGNOSIS — R20.2 PARESTHESIA: ICD-10-CM

## 2023-03-02 DIAGNOSIS — M19.09 PRIMARY OSTEOARTHRITIS OF OTHER SITE: ICD-10-CM

## 2023-03-02 DIAGNOSIS — I10 PRIMARY HYPERTENSION: Primary | ICD-10-CM

## 2023-03-02 DIAGNOSIS — M79.604 ACHING LEG SYNDROME OF RIGHT LOWER EXTREMITY: ICD-10-CM

## 2023-03-02 DIAGNOSIS — Z12.11 SCREEN FOR COLON CANCER: ICD-10-CM

## 2023-03-02 DIAGNOSIS — Z23 NEED FOR VACCINATION: ICD-10-CM

## 2023-03-02 LAB — VITAMIN B-12: 273 PG/ML (ref 211–911)

## 2023-03-02 PROCEDURE — 99214 OFFICE O/P EST MOD 30 MIN: CPT | Performed by: FAMILY MEDICINE

## 2023-03-02 PROCEDURE — 90715 TDAP VACCINE 7 YRS/> IM: CPT | Performed by: FAMILY MEDICINE

## 2023-03-02 PROCEDURE — 3079F DIAST BP 80-89 MM HG: CPT | Performed by: FAMILY MEDICINE

## 2023-03-02 PROCEDURE — 3075F SYST BP GE 130 - 139MM HG: CPT | Performed by: FAMILY MEDICINE

## 2023-03-02 PROCEDURE — 36415 COLL VENOUS BLD VENIPUNCTURE: CPT | Performed by: FAMILY MEDICINE

## 2023-03-02 PROCEDURE — 90471 IMMUNIZATION ADMIN: CPT | Performed by: FAMILY MEDICINE

## 2023-03-02 RX ORDER — GABAPENTIN 100 MG/1
100 CAPSULE ORAL 2 TIMES DAILY
Qty: 180 CAPSULE | Refills: 1 | Status: SHIPPED | OUTPATIENT
Start: 2023-03-02 | End: 2023-08-29

## 2023-03-02 RX ORDER — LISINOPRIL 5 MG/1
TABLET ORAL
COMMUNITY
Start: 2023-02-22

## 2023-03-02 SDOH — ECONOMIC STABILITY: INCOME INSECURITY: HOW HARD IS IT FOR YOU TO PAY FOR THE VERY BASICS LIKE FOOD, HOUSING, MEDICAL CARE, AND HEATING?: NOT HARD AT ALL

## 2023-03-02 SDOH — ECONOMIC STABILITY: FOOD INSECURITY: WITHIN THE PAST 12 MONTHS, YOU WORRIED THAT YOUR FOOD WOULD RUN OUT BEFORE YOU GOT MONEY TO BUY MORE.: NEVER TRUE

## 2023-03-02 SDOH — ECONOMIC STABILITY: FOOD INSECURITY: WITHIN THE PAST 12 MONTHS, THE FOOD YOU BOUGHT JUST DIDN'T LAST AND YOU DIDN'T HAVE MONEY TO GET MORE.: NEVER TRUE

## 2023-03-02 SDOH — ECONOMIC STABILITY: HOUSING INSECURITY
IN THE LAST 12 MONTHS, WAS THERE A TIME WHEN YOU DID NOT HAVE A STEADY PLACE TO SLEEP OR SLEPT IN A SHELTER (INCLUDING NOW)?: NO

## 2023-03-02 ASSESSMENT — PATIENT HEALTH QUESTIONNAIRE - PHQ9
SUM OF ALL RESPONSES TO PHQ QUESTIONS 1-9: 0
SUM OF ALL RESPONSES TO PHQ9 QUESTIONS 1 & 2: 0
SUM OF ALL RESPONSES TO PHQ QUESTIONS 1-9: 0
2. FEELING DOWN, DEPRESSED OR HOPELESS: 0
SUM OF ALL RESPONSES TO PHQ QUESTIONS 1-9: 0
1. LITTLE INTEREST OR PLEASURE IN DOING THINGS: 0
SUM OF ALL RESPONSES TO PHQ QUESTIONS 1-9: 0

## 2023-03-02 NOTE — PROGRESS NOTES
Subjective:      Patient ID: Kasandra Gauthier is a 62 y.o. female. Hypertension     denies  ha, visual changes, syncope, presyncope, cp, sob, palpitations, edema, torres, orthopnea, pnd,  Compliant with medication, no secondary effects     Other  This is a chronic (leg pain, paresthesia) problem. The current episode started more than 1 year ago. The problem has been waxing and waning. Pertinent negatives include no chest pain. Nothing aggravates the symptoms. She has tried nothing for the symptoms. Hand Pain   There was no injury mechanism. Pain location: both hand but more on R side. The quality of the pain is described as shooting. The pain does not radiate. The pain is severe. The pain has been Constant since the incident. Pertinent negatives include no chest pain or muscle weakness. The symptoms are aggravated by movement and lifting. She has tried nothing for the symptoms. L christina paresthesia    Review of Systems   Cardiovascular:  Negative for chest pain. Objective:  Blood pressure 132/80, pulse 91, temperature 97.3 °F (36.3 °C), temperature source Tympanic, resp. rate 16, height 5' 3\" (1.6 m), weight 117 lb 14.4 oz (53.5 kg), last menstrual period 08/19/2013, SpO2 98 %, not currently breastfeeding. Physical Exam  Vitals and nursing note reviewed. Constitutional:       General: She is not in acute distress. Appearance: Normal appearance. She is well-developed. She is not ill-appearing, toxic-appearing or diaphoretic. HENT:      Head: Normocephalic. Eyes:      General: No scleral icterus. Extraocular Movements: Extraocular movements intact. Conjunctiva/sclera: Conjunctivae normal.      Pupils: Pupils are equal, round, and reactive to light. Neck:      Thyroid: No thyromegaly. Vascular: No carotid bruit or JVD. Comments: No carotid bruits  Cardiovascular:      Rate and Rhythm: Normal rate and regular rhythm.       Pulses:           Carotid pulses are 2+ on the right side and 2+ on the left side. Heart sounds: Normal heart sounds. No murmur heard. No friction rub. No gallop. Comments: No edema    Pulmonary:      Effort: Pulmonary effort is normal. No respiratory distress. Breath sounds: Normal breath sounds. No stridor. No wheezing, rhonchi or rales. Chest:      Chest wall: No tenderness. Abdominal:      General: Bowel sounds are normal.      Palpations: Abdomen is soft. There is no mass. Tenderness: There is no abdominal tenderness. Musculoskeletal:      Cervical back: Normal range of motion and neck supple. Right lower leg: No edema. Left lower leg: No edema. Lymphadenopathy:      Cervical: No cervical adenopathy. Skin:     General: Skin is warm. Findings: No lesion or rash. Neurological:      General: No focal deficit present. Mental Status: She is alert and oriented to person, place, and time. Mental status is at baseline. Cranial Nerves: No cranial nerve deficit. Motor: No weakness or abnormal muscle tone. Gait: Gait normal.      Deep Tendon Reflexes: Reflexes are normal and symmetric. Psychiatric:         Mood and Affect: Mood normal.         Thought Content: Thought content normal.       Assessment:       Diagnosis Orders   1. Primary hypertension        2. Bilateral leg aching/tiredness  Handicap Placard MISC      3. Paresthesia  gabapentin (NEURONTIN) 100 MG capsule      4. Primary osteoarthritis of other site  diclofenac sodium (VOLTAREN) 1 % GEL      5. Screen for colon cancer  Fecal DNA Colorectal cancer screening (Cologuard)      6. Encounter for screening mammogram for malignant neoplasm of breast  HUI DIGITAL SCREEN W OR WO CAD BILATERAL              Plan:      Improved  Continue same medications no changes needed at this time   Encourage compliance with medication, life style changes    2. NOS   Rx for handicap placard renewal     3.  On both legs, trail with Gabapentin   Controlled Substances Monitoring: Attestation: The Prescription Monitoring Report for this patient was reviewed today.  (Loreto Sales MD)  Documentation: No signs of potential drug abuse or diversion identified. (Loreto Sales MD)  Fu 3 mo     4. On both hands with possible cubital TS on L   Wear wrist brace at [facility] as recommended  Trial with topical nsaid    Fu in 3 mo for AWV  Declines pap smear  Tdap today   Colon ca screening discussed today, will proceed with Cologuard.   Referral for mammogram             Loreto Sales MD

## 2023-03-07 ENCOUNTER — NURSE ONLY (OUTPATIENT)
Dept: FAMILY MEDICINE CLINIC | Age: 58
End: 2023-03-07
Payer: COMMERCIAL

## 2023-03-07 DIAGNOSIS — D51.9 ANEMIA DUE TO VITAMIN B12 DEFICIENCY, UNSPECIFIED B12 DEFICIENCY TYPE: Primary | ICD-10-CM

## 2023-03-07 LAB
ALLERGEN BARLEY IGE: <0.1 KU/L (ref 0–0.34)
ALLERGEN BEEF: <0.1 KU/L (ref 0–0.34)
ALLERGEN CABBAGE IGE: <0.1 KU/L (ref 0–0.34)
ALLERGEN CARROT IGE: <0.1 KU/L (ref 0–0.34)
ALLERGEN CHICKEN IGE: <0.1 KU/L (ref 0–0.34)
ALLERGEN CODFISH IGE: <0.1 KU/L (ref 0–0.34)
ALLERGEN CORN IGE: <0.1 KU/L (ref 0–0.34)
ALLERGEN COW MILK IGE: <0.1 KU/L (ref 0–0.34)
ALLERGEN CRAB IGE: 0.15 KU/L (ref 0–0.34)
ALLERGEN EGG WHITE IGE: <0.1 KU/L (ref 0–0.34)
ALLERGEN GRAPE IGE: <0.1 KU/L (ref 0–0.34)
ALLERGEN LETTUCE IGE: <0.1 KU/L (ref 0–0.34)
ALLERGEN NAVY BEAN: <0.1 KU/L (ref 0–0.34)
ALLERGEN OAT: <0.1 KU/L (ref 0–0.34)
ALLERGEN ORANGE IGE: <0.1 KU/L (ref 0–0.34)
ALLERGEN PEANUT (F13) IGE: <0.1 KU/L (ref 0–0.34)
ALLERGEN PEPPER C. ANNUUM IGE: <0.1 KU/L (ref 0–0.34)
ALLERGEN PORK: <0.1 KU/L (ref 0–0.34)
ALLERGEN RICE IGE: <0.1 KU/L (ref 0–0.34)
ALLERGEN RYE IGE: <0.1 KU/L (ref 0–0.34)
ALLERGEN SOYBEAN IGE: <0.1 KU/L (ref 0–0.34)
ALLERGEN TOMATO IGE: <0.1 KU/L (ref 0–0.34)
ALLERGEN TUNA IGE: <0.1 KU/L (ref 0–0.34)
ALLERGEN WHEAT IGE: 0.22 KU/L (ref 0–0.34)
IGE: 583 IU/ML
POTATO, IGE: <0.1 KU/L (ref 0–0.34)
SHRIMP: 0.34 KU/L (ref 0–0.34)

## 2023-03-07 PROCEDURE — 96372 THER/PROPH/DIAG INJ SC/IM: CPT | Performed by: FAMILY MEDICINE

## 2023-03-07 RX ORDER — CYANOCOBALAMIN 1000 UG/ML
1000 INJECTION, SOLUTION INTRAMUSCULAR; SUBCUTANEOUS ONCE
Status: COMPLETED | OUTPATIENT
Start: 2023-03-07 | End: 2023-03-07

## 2023-03-07 RX ADMIN — CYANOCOBALAMIN 1000 MCG: 1000 INJECTION, SOLUTION INTRAMUSCULAR; SUBCUTANEOUS at 09:13

## 2023-03-15 DIAGNOSIS — J30.9 ALLERGIC RHINITIS, UNSPECIFIED SEASONALITY, UNSPECIFIED TRIGGER: Primary | ICD-10-CM

## 2023-04-07 ENCOUNTER — NURSE ONLY (OUTPATIENT)
Dept: FAMILY MEDICINE CLINIC | Age: 58
End: 2023-04-07
Payer: COMMERCIAL

## 2023-04-07 DIAGNOSIS — D51.9 ANEMIA DUE TO VITAMIN B12 DEFICIENCY, UNSPECIFIED B12 DEFICIENCY TYPE: Primary | ICD-10-CM

## 2023-04-07 PROCEDURE — 96372 THER/PROPH/DIAG INJ SC/IM: CPT | Performed by: FAMILY MEDICINE

## 2023-04-07 RX ORDER — CYANOCOBALAMIN 1000 UG/ML
1000 INJECTION, SOLUTION INTRAMUSCULAR; SUBCUTANEOUS ONCE
Status: COMPLETED | OUTPATIENT
Start: 2023-04-07 | End: 2023-04-07

## 2023-04-07 RX ADMIN — CYANOCOBALAMIN 1000 MCG: 1000 INJECTION, SOLUTION INTRAMUSCULAR; SUBCUTANEOUS at 15:16

## 2023-04-23 DIAGNOSIS — Z76.0 MEDICATION REFILL: ICD-10-CM

## 2023-04-24 RX ORDER — LORATADINE 10 MG/1
TABLET ORAL
Qty: 90 TABLET | Refills: 0 | Status: SHIPPED | OUTPATIENT
Start: 2023-04-24

## 2023-04-24 NOTE — TELEPHONE ENCOUNTER
Medication:   Requested Prescriptions     Pending Prescriptions Disp Refills    loratadine (CLARITIN) 10 MG tablet [Pharmacy Med Name: Loratadine Oral Tablet 10 MG] 90 tablet 0     Sig: TAKE 1 TABLET BY MOUTH EVERY DAY        Last Filled:      Patient Phone Number: [phone] (home)     Last appt: 3/2/2023   Next appt: Visit date not found    Last OARRS: No flowsheet data found.

## 2023-05-05 ENCOUNTER — NURSE ONLY (OUTPATIENT)
Dept: FAMILY MEDICINE CLINIC | Age: 58
End: 2023-05-05
Payer: COMMERCIAL

## 2023-05-05 DIAGNOSIS — D51.9 ANEMIA DUE TO VITAMIN B12 DEFICIENCY, UNSPECIFIED B12 DEFICIENCY TYPE: Primary | ICD-10-CM

## 2023-05-05 PROCEDURE — 96372 THER/PROPH/DIAG INJ SC/IM: CPT | Performed by: FAMILY MEDICINE

## 2023-05-05 RX ORDER — CYANOCOBALAMIN 1000 UG/ML
1000 INJECTION, SOLUTION INTRAMUSCULAR; SUBCUTANEOUS ONCE
Status: COMPLETED | OUTPATIENT
Start: 2023-05-05 | End: 2023-05-05

## 2023-05-05 RX ADMIN — CYANOCOBALAMIN 1000 MCG: 1000 INJECTION, SOLUTION INTRAMUSCULAR; SUBCUTANEOUS at 10:38

## 2023-06-02 DIAGNOSIS — K21.9 GASTROESOPHAGEAL REFLUX DISEASE WITHOUT ESOPHAGITIS: ICD-10-CM

## 2023-06-02 DIAGNOSIS — K52.9 ACUTE GASTROENTERITIS: ICD-10-CM

## 2023-06-02 DIAGNOSIS — R11.0 NAUSEA: ICD-10-CM

## 2023-06-05 ENCOUNTER — NURSE ONLY (OUTPATIENT)
Dept: FAMILY MEDICINE CLINIC | Age: 58
End: 2023-06-05
Payer: COMMERCIAL

## 2023-06-05 DIAGNOSIS — E53.8 B12 DEFICIENCY: Primary | ICD-10-CM

## 2023-06-05 PROCEDURE — 96372 THER/PROPH/DIAG INJ SC/IM: CPT | Performed by: FAMILY MEDICINE

## 2023-06-05 RX ORDER — PANTOPRAZOLE SODIUM 20 MG/1
TABLET, DELAYED RELEASE ORAL
Qty: 30 TABLET | Refills: 0 | Status: SHIPPED | OUTPATIENT
Start: 2023-06-05

## 2023-06-05 RX ORDER — CYANOCOBALAMIN 1000 UG/ML
1000 INJECTION, SOLUTION INTRAMUSCULAR; SUBCUTANEOUS ONCE
Status: COMPLETED | OUTPATIENT
Start: 2023-06-05 | End: 2023-06-05

## 2023-06-05 RX ADMIN — CYANOCOBALAMIN 1000 MCG: 1000 INJECTION, SOLUTION INTRAMUSCULAR; SUBCUTANEOUS at 10:58

## 2023-06-27 DIAGNOSIS — R11.0 NAUSEA: ICD-10-CM

## 2023-06-27 DIAGNOSIS — K21.9 GASTROESOPHAGEAL REFLUX DISEASE WITHOUT ESOPHAGITIS: ICD-10-CM

## 2023-06-27 DIAGNOSIS — K52.9 ACUTE GASTROENTERITIS: ICD-10-CM

## 2023-06-27 RX ORDER — PANTOPRAZOLE SODIUM 20 MG/1
TABLET, DELAYED RELEASE ORAL
Qty: 30 TABLET | Refills: 0 | Status: SHIPPED | OUTPATIENT
Start: 2023-06-27

## 2023-07-06 ENCOUNTER — NURSE ONLY (OUTPATIENT)
Dept: FAMILY MEDICINE CLINIC | Age: 58
End: 2023-07-06
Payer: COMMERCIAL

## 2023-07-06 DIAGNOSIS — E53.8 B12 DEFICIENCY: Primary | ICD-10-CM

## 2023-07-06 PROCEDURE — 96372 THER/PROPH/DIAG INJ SC/IM: CPT | Performed by: FAMILY MEDICINE

## 2023-07-06 RX ORDER — CYANOCOBALAMIN 1000 UG/ML
1000 INJECTION, SOLUTION INTRAMUSCULAR; SUBCUTANEOUS ONCE
Status: COMPLETED | OUTPATIENT
Start: 2023-07-06 | End: 2023-07-06

## 2023-07-06 RX ADMIN — CYANOCOBALAMIN 1000 MCG: 1000 INJECTION, SOLUTION INTRAMUSCULAR; SUBCUTANEOUS at 10:54

## 2023-07-22 DIAGNOSIS — E78.5 HYPERLIPIDEMIA, UNSPECIFIED HYPERLIPIDEMIA TYPE: ICD-10-CM

## 2023-07-24 RX ORDER — ATORVASTATIN CALCIUM 10 MG/1
TABLET, FILM COATED ORAL
Qty: 90 TABLET | Refills: 3 | Status: SHIPPED | OUTPATIENT
Start: 2023-07-24

## 2023-07-24 NOTE — TELEPHONE ENCOUNTER
Medication:   Requested Prescriptions     Pending Prescriptions Disp Refills    atorvastatin (LIPITOR) 10 MG tablet [Pharmacy Med Name: Atorvastatin Calcium Oral Tablet 10 MG] 90 tablet 0     Sig: TAKE 1 TABLET BY MOUTH EVERY DAY        Last Filled:      Patient Phone Number: [phone] (home)     Last appt: 3/2/2023   Next appt: 8/7/2023    Last OARRS: No flowsheet data found.

## 2023-08-07 ENCOUNTER — NURSE ONLY (OUTPATIENT)
Dept: FAMILY MEDICINE CLINIC | Age: 58
End: 2023-08-07
Payer: COMMERCIAL

## 2023-08-07 DIAGNOSIS — E53.8 VITAMIN B 12 DEFICIENCY: Primary | ICD-10-CM

## 2023-08-07 DIAGNOSIS — E53.8 B12 DEFICIENCY: Primary | ICD-10-CM

## 2023-08-07 PROCEDURE — 96372 THER/PROPH/DIAG INJ SC/IM: CPT | Performed by: FAMILY MEDICINE

## 2023-08-07 RX ORDER — CYANOCOBALAMIN 1000 UG/ML
1000 INJECTION, SOLUTION INTRAMUSCULAR; SUBCUTANEOUS ONCE
Status: COMPLETED | OUTPATIENT
Start: 2023-08-07 | End: 2023-08-07

## 2023-08-07 RX ADMIN — CYANOCOBALAMIN 1000 MCG: 1000 INJECTION, SOLUTION INTRAMUSCULAR; SUBCUTANEOUS at 10:45

## 2023-08-08 LAB
FOLATE: 13.1 NG/ML
VITAMIN B-12: >2000 PG/ML (ref 232–1245)

## 2023-08-10 ENCOUNTER — OFFICE VISIT (OUTPATIENT)
Dept: ENDOCRINOLOGY | Age: 58
End: 2023-08-10
Payer: COMMERCIAL

## 2023-08-10 VITALS
HEART RATE: 83 BPM | BODY MASS INDEX: 21.26 KG/M2 | HEIGHT: 63 IN | WEIGHT: 120 LBS | DIASTOLIC BLOOD PRESSURE: 73 MMHG | OXYGEN SATURATION: 98 % | SYSTOLIC BLOOD PRESSURE: 119 MMHG

## 2023-08-10 DIAGNOSIS — M89.59: ICD-10-CM

## 2023-08-10 DIAGNOSIS — E27.8 ADRENAL MASS (HCC): ICD-10-CM

## 2023-08-10 DIAGNOSIS — Q71.899: ICD-10-CM

## 2023-08-10 DIAGNOSIS — M65.9: ICD-10-CM

## 2023-08-10 DIAGNOSIS — F17.200 SMOKING: ICD-10-CM

## 2023-08-10 DIAGNOSIS — E55.9 VITAMIN D DEFICIENCY: Primary | ICD-10-CM

## 2023-08-10 PROCEDURE — 3074F SYST BP LT 130 MM HG: CPT | Performed by: INTERNAL MEDICINE

## 2023-08-10 PROCEDURE — 99214 OFFICE O/P EST MOD 30 MIN: CPT | Performed by: INTERNAL MEDICINE

## 2023-08-10 PROCEDURE — 3078F DIAST BP <80 MM HG: CPT | Performed by: INTERNAL MEDICINE

## 2023-08-10 RX ORDER — AZELASTINE 1 MG/ML
SPRAY, METERED NASAL
COMMUNITY
Start: 2023-07-24

## 2023-08-10 NOTE — PROGRESS NOTES
Patient ID:   Addison Kinsey is a 62 y.o. female    Chief Complaint:   Addison Kinsey is seen for an evaluation of bilateral adrenal nodules, hyperaldosteronism. Subjective:   Addison Kinsey was admitted after cardiac arrest in Oct 2017. K was 1.9 and QT prolongation. LHC showed EF 25%. Nov 2017, Echo showed EF 60%. In July 2016 she had an admission for hypokalemia and hypertensive urgency. Renin and toby were suppressed. She did not follow after the ER visit. In Oct 2017 hospitalisations following collections were done. Nov 2nd 2017: Renin 0.1, Toby 4.2 with normal K. Nov 8th 2017: Renin 0.1, Toby 4.1, normal K. Nov 11th 2017: Renin 0.1, Toby 3.2, normal K.     CT Abd/Pelvis e contrast July 2016 showed bilateral adenomatous adrenal glands. Images reviewed, HU units in 40's with contrast.   CT Abd/Pelvis e contrast Nov 2017 showed left sided 1.2cms and right sided 1.5cms nodules. Ct abdomen for kidney stones - Feb 2020: Bilateral adrenal adenomata, measuring up to 1.6 cm on the right and 1.5 cm on the left, have not significantly changed. No kids, postmenopause   Family history of high blood pressure: Not aware. Dad had sudden cardiac death at age 80. OTC meds: Denies   Denies illicit drugs     Interval history:      K normal on K replacement as per nephology   On lisinopril 5 mg daily     Has occasional anxiety   Energy levels are low . 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  Grew watermelon this year   No lightheadedness, palpitations, excessive sweating     Stable muscle cramps     Taking Vit D is once a week     Smoking 1 PPD     The following portions of the patient's history were reviewed and updated as appropriate:      Family History   Problem Relation Age of Onset    Heart Disease Mother     Allergy (Severe) Mother     Arthritis Mother     Coronary Art Dis Mother     High Cholesterol Mother     High Blood Pressure Mother     Mental Illness Mother     Obesity Mother     Heart Yes

## 2023-08-24 DIAGNOSIS — K21.9 GASTROESOPHAGEAL REFLUX DISEASE WITHOUT ESOPHAGITIS: ICD-10-CM

## 2023-08-24 DIAGNOSIS — K52.9 ACUTE GASTROENTERITIS: ICD-10-CM

## 2023-08-24 DIAGNOSIS — R11.0 NAUSEA: ICD-10-CM

## 2023-08-24 RX ORDER — PANTOPRAZOLE SODIUM 20 MG/1
TABLET, DELAYED RELEASE ORAL
Qty: 30 TABLET | Refills: 3 | Status: SHIPPED | OUTPATIENT
Start: 2023-08-24

## 2023-08-24 NOTE — TELEPHONE ENCOUNTER
Medication:   Requested Prescriptions     Pending Prescriptions Disp Refills    pantoprazole (PROTONIX) 20 MG tablet [Pharmacy Med Name: Pantoprazole Sodium Oral Tablet Delayed Release 20 MG] 30 tablet 0     Sig: TAKE 1 TABLET BY MOUTH EVERY DAY        Last Filled:      Patient Phone Number: [phone] (home)     Last appt: 3/2/2023   Next appt: Visit date not found    Last OARRS: No flowsheet data found.

## 2023-08-30 LAB
CELLS ANALYZED: 20
CELLS COUNTED: 20
CELLS KARYOTYPED.TOTAL BLD/T: 2
DIAGNOSTIC IMPRESSION: NORMAL
HB: SOURCE: NORMAL
Lab: 500
Lab: NORMAL
Lab: NORMAL
METANEPHRINE FREE PLASMA: <10 PG/ML (ref 0–88)
NORMETANEPHRINE PLASMA: 56.5 PG/ML (ref 0–244)
VITAMIN D 25-HYDROXY: 50.7 NG/ML (ref 30–100)

## 2023-08-31 ENCOUNTER — CLINICAL DOCUMENTATION (OUTPATIENT)
Dept: ENDOCRINOLOGY | Age: 58
End: 2023-08-31

## 2023-09-11 NOTE — TELEPHONE ENCOUNTER
Medication:   Requested Prescriptions     Pending Prescriptions Disp Refills    nicotine (NICODERM CQ) 21 MG/24HR [Pharmacy Med Name: Nicotine Transdermal Patch 24 Hour 21 MG/24HR]  0     Sig: APPLY 1 PATCH EXTERNALLY EVERY 24 HOURS        Last Filled:      Patient Phone Number: [phone] (home)     Last appt: 3/2/2023   Next appt: Visit date not found    Last OARRS:        No data to display

## 2023-09-12 RX ORDER — NICOTINE 21 MG/24HR
PATCH, TRANSDERMAL 24 HOURS TRANSDERMAL
Qty: 30 PATCH | Refills: 0 | Status: SHIPPED | OUTPATIENT
Start: 2023-09-12

## 2023-09-22 DIAGNOSIS — E55.9 VITAMIN D DEFICIENCY: ICD-10-CM

## 2023-09-22 RX ORDER — METHOCARBAMOL 750 MG/1
TABLET ORAL
Qty: 13 CAPSULE | Refills: 0 | Status: SHIPPED | OUTPATIENT
Start: 2023-09-22

## 2023-09-22 NOTE — TELEPHONE ENCOUNTER
Requested Refill:   Requested Prescriptions     Pending Prescriptions Disp Refills    D3-50 1.25 MG (06955 UT) CAPS [Pharmacy Med Name: D3-50 Oral Capsule 1.25 MG (29326 UT)] 13 capsule 0     Sig: TAKE 1 CAPSULE BY MOUTH ONE TIME A WEEK       Last refilled : 1/20/2023 # 13 capsules with 1 refill     Last Appt: 8/10/2023  Next Appt: 8/8/2024

## 2023-12-29 DIAGNOSIS — E55.9 VITAMIN D DEFICIENCY: ICD-10-CM

## 2024-01-02 RX ORDER — METHOCARBAMOL 750 MG/1
1 TABLET ORAL WEEKLY
Qty: 13 CAPSULE | Refills: 0 | Status: SHIPPED | OUTPATIENT
Start: 2024-01-02

## 2024-01-06 DIAGNOSIS — R11.0 NAUSEA: ICD-10-CM

## 2024-01-06 DIAGNOSIS — K21.9 GASTROESOPHAGEAL REFLUX DISEASE WITHOUT ESOPHAGITIS: ICD-10-CM

## 2024-01-06 DIAGNOSIS — K52.9 ACUTE GASTROENTERITIS: ICD-10-CM

## 2024-01-08 DIAGNOSIS — K52.9 ACUTE GASTROENTERITIS: ICD-10-CM

## 2024-01-08 DIAGNOSIS — K21.9 GASTROESOPHAGEAL REFLUX DISEASE WITHOUT ESOPHAGITIS: ICD-10-CM

## 2024-01-08 DIAGNOSIS — R11.0 NAUSEA: ICD-10-CM

## 2024-01-08 RX ORDER — PANTOPRAZOLE SODIUM 20 MG/1
TABLET, DELAYED RELEASE ORAL
Qty: 30 TABLET | Refills: 0 | Status: SHIPPED | OUTPATIENT
Start: 2024-01-08 | End: 2024-01-08

## 2024-01-08 RX ORDER — PANTOPRAZOLE SODIUM 20 MG/1
TABLET, DELAYED RELEASE ORAL
Qty: 30 TABLET | Refills: 0 | Status: SHIPPED | OUTPATIENT
Start: 2024-01-08

## 2024-01-08 NOTE — TELEPHONE ENCOUNTER
FAYE Medication:   Requested Prescriptions     Pending Prescriptions Disp Refills    pantoprazole (PROTONIX) 20 MG tablet [Pharmacy Med Name: Pantoprazole Sodium Oral Tablet Delayed Release 20 MG] 30 tablet 0     Sig: TAKE 1 TABLET BY MOUTH EVERY DAY     Last Filled:  #30 with 3 refills on 8/24/23    Last appt: 3/2/2023   Next appt: Visit date not found    Last OARRS:        No data to display

## 2024-01-09 ENCOUNTER — COMMUNITY OUTREACH (OUTPATIENT)
Dept: FAMILY MEDICINE CLINIC | Age: 59
End: 2024-01-09

## 2024-01-09 NOTE — PROGRESS NOTES
Patient's HM shows they are overdue for Mammogram, Colorectal Screening.  Care Everywhere and  files searched.  No results to attach to order nor HM updated.        4 = No assist / stand by assistance

## 2024-04-06 DIAGNOSIS — E55.9 VITAMIN D DEFICIENCY: ICD-10-CM

## 2024-04-08 RX ORDER — METHOCARBAMOL 750 MG/1
TABLET ORAL
Qty: 12 CAPSULE | Refills: 2 | Status: SHIPPED | OUTPATIENT
Start: 2024-04-08

## 2024-04-08 NOTE — TELEPHONE ENCOUNTER
Medication:   Requested Prescriptions     Pending Prescriptions Disp Refills    D3-50 1.25 MG (97083 UT) CAPS [Pharmacy Med Name: D3-50 Oral Capsule 1.25 MG (56731 UT)] 12 capsule 2     Sig: TAKE 1 CAPSULE BY MOUTH ONCE EVERY WEEK.       Last Filled:      Patient Phone Number: [phone] (home)     Last appt: 8/10/2023   Next appt: 8/8/2024    Last Labs DM:   Lab Results   Component Value Date/Time    VITD25 50.7 08/16/2023 08:51 AM    VITD25 58.2 08/11/2022 03:04 PM

## 2024-06-22 DIAGNOSIS — K21.9 GASTROESOPHAGEAL REFLUX DISEASE WITHOUT ESOPHAGITIS: ICD-10-CM

## 2024-06-22 DIAGNOSIS — R11.0 NAUSEA: ICD-10-CM

## 2024-06-22 DIAGNOSIS — K52.9 ACUTE GASTROENTERITIS: ICD-10-CM

## 2024-06-24 RX ORDER — PANTOPRAZOLE SODIUM 20 MG/1
TABLET, DELAYED RELEASE ORAL
Qty: 30 TABLET | Refills: 0 | Status: SHIPPED | OUTPATIENT
Start: 2024-06-24

## 2024-06-24 NOTE — TELEPHONE ENCOUNTER
Medication:   Requested Prescriptions     Pending Prescriptions Disp Refills    pantoprazole (PROTONIX) 20 MG tablet [Pharmacy Med Name: Pantoprazole Sodium Oral Tablet Delayed Release 20 MG] 30 tablet 0     Sig: TAKE 1 TABLET BY MOUTH EVERY DAY        Last Filled:        01/08/2024 01/08/2024        Patient Phone Number: [phone] (home)     Last appt: 3/2/2023   Next appt: Visit date not found    Last OARRS:        No data to display

## 2024-07-19 DIAGNOSIS — K21.9 GASTROESOPHAGEAL REFLUX DISEASE WITHOUT ESOPHAGITIS: ICD-10-CM

## 2024-07-19 DIAGNOSIS — K52.9 ACUTE GASTROENTERITIS: ICD-10-CM

## 2024-07-19 DIAGNOSIS — R11.0 NAUSEA: ICD-10-CM

## 2024-07-19 RX ORDER — PANTOPRAZOLE SODIUM 20 MG/1
TABLET, DELAYED RELEASE ORAL
Qty: 30 TABLET | Refills: 3 | Status: SHIPPED | OUTPATIENT
Start: 2024-07-19

## 2024-07-19 NOTE — TELEPHONE ENCOUNTER
Medication:   Requested Prescriptions     Pending Prescriptions Disp Refills    pantoprazole (PROTONIX) 20 MG tablet [Pharmacy Med Name: Pantoprazole Sodium Oral Tablet Delayed Release 20 MG] 30 tablet 0     Sig: TAKE 1 TABLET BY MOUTH EVERY DAY        Last Filled:  06/24/2024     Patient Phone Number: [phone] (home)     Last appt: 3/2/2023   Next appt: Visit date not found    Last OARRS:        No data to display

## 2024-08-08 ENCOUNTER — OFFICE VISIT (OUTPATIENT)
Dept: ENDOCRINOLOGY | Age: 59
End: 2024-08-08
Payer: COMMERCIAL

## 2024-08-08 VITALS
DIASTOLIC BLOOD PRESSURE: 90 MMHG | OXYGEN SATURATION: 97 % | SYSTOLIC BLOOD PRESSURE: 163 MMHG | BODY MASS INDEX: 20.77 KG/M2 | HEART RATE: 102 BPM | WEIGHT: 117.2 LBS | HEIGHT: 63 IN

## 2024-08-08 DIAGNOSIS — E27.8 ADRENAL MASS (HCC): ICD-10-CM

## 2024-08-08 DIAGNOSIS — I10 ESSENTIAL HYPERTENSION: ICD-10-CM

## 2024-08-08 DIAGNOSIS — R25.2 MUSCLE CRAMPS: ICD-10-CM

## 2024-08-08 DIAGNOSIS — E55.9 VITAMIN D DEFICIENCY: Primary | ICD-10-CM

## 2024-08-08 DIAGNOSIS — E55.9 VITAMIN D DEFICIENCY: ICD-10-CM

## 2024-08-08 LAB
25(OH)D3 SERPL-MCNC: 103.8 NG/ML
ALBUMIN SERPL-MCNC: 3.9 G/DL (ref 3.4–5)
ALBUMIN/GLOB SERPL: 1.5 {RATIO} (ref 1.1–2.2)
ALP SERPL-CCNC: 135 U/L (ref 40–129)
ALT SERPL-CCNC: 8 U/L (ref 10–40)
ANION GAP SERPL CALCULATED.3IONS-SCNC: 10 MMOL/L (ref 3–16)
AST SERPL-CCNC: 21 U/L (ref 15–37)
BILIRUB SERPL-MCNC: <0.2 MG/DL (ref 0–1)
BUN SERPL-MCNC: 8 MG/DL (ref 7–20)
CALCIUM SERPL-MCNC: 9.3 MG/DL (ref 8.3–10.6)
CHLORIDE SERPL-SCNC: 101 MMOL/L (ref 99–110)
CO2 SERPL-SCNC: 32 MMOL/L (ref 21–32)
CREAT SERPL-MCNC: 0.9 MG/DL (ref 0.6–1.1)
GFR SERPLBLD CREATININE-BSD FMLA CKD-EPI: 74 ML/MIN/{1.73_M2}
GLUCOSE SERPL-MCNC: 94 MG/DL (ref 70–99)
MAGNESIUM SERPL-MCNC: 1.5 MG/DL (ref 1.8–2.4)
POTASSIUM SERPL-SCNC: 3.3 MMOL/L (ref 3.5–5.1)
PROT SERPL-MCNC: 6.5 G/DL (ref 6.4–8.2)
SODIUM SERPL-SCNC: 143 MMOL/L (ref 136–145)
TSH SERPL DL<=0.005 MIU/L-ACNC: 1.74 UIU/ML (ref 0.27–4.2)

## 2024-08-08 PROCEDURE — 99214 OFFICE O/P EST MOD 30 MIN: CPT | Performed by: INTERNAL MEDICINE

## 2024-08-08 PROCEDURE — 3079F DIAST BP 80-89 MM HG: CPT | Performed by: INTERNAL MEDICINE

## 2024-08-08 PROCEDURE — 3077F SYST BP >= 140 MM HG: CPT | Performed by: INTERNAL MEDICINE

## 2024-08-08 RX ORDER — ALLOPURINOL 100 MG/1
100 TABLET ORAL DAILY
COMMUNITY

## 2024-08-08 RX ORDER — LISINOPRIL 10 MG/1
10 TABLET ORAL NIGHTLY
Qty: 90 TABLET | Refills: 1 | Status: SHIPPED | OUTPATIENT
Start: 2024-08-08

## 2024-08-08 NOTE — PROGRESS NOTES
Patient ID:   Cira Tavares is a 58 y.o. female    Chief Complaint:   Cira Tavares is seen for an evaluation of bilateral adrenal nodules, hyperaldosteronism.     Subjective:   Cira Tavares was admitted after cardiac arrest in Oct 2017. K was 1.9 and QT prolongation. LHC showed EF 25%. Nov 2017, Echo showed EF 60%.   In July 2016 she had an admission for hypokalemia and hypertensive urgency. Renin and toby were suppressed. She did not follow after the ER visit.   In Oct 2017 hospitalisations following collections were done.   Nov 2nd 2017: Renin 0.1, Toby 4.2 with normal K.   Nov 8th 2017: Renin 0.1, Toby 4.1, normal K.   Nov 11th 2017: Renin 0.1, Toby 3.2, normal K.     CT Abd/Pelvis e contrast July 2016 showed bilateral adenomatous adrenal glands. Images reviewed, HU units in 40's with contrast.   CT Abd/Pelvis e contrast Nov 2017 showed left sided 1.2cms and right sided 1.5cms nodules.   Ct abdomen for kidney stones - Feb 2020: Bilateral adrenal adenomata, measuring up to 1.6 cm on the right and 1.5 cm on the left, have not significantly changed.     No kids, postmenopause   Family history of high blood pressure: Not aware. Dad had sudden cardiac death at age 83.   OTC meds: Denies   Denies illicit drugs     Interval history:      K normal on K replacement as per nephology   On lisinopril 5 mg daily     Stressed out at work today   No anxiety   Energy levels are low . 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 Grew watermelon this year   No lightheadedness, palpitations, excessive sweating     Stable muscle cramps   Usually cold. Recently warmer recently     Taking Vit D is once a week     Smoking 1 PPD     The following portions of the patient's history were reviewed and updated as appropriate:      Family History   Problem Relation Age of Onset    Heart Disease Mother     Allergy (Severe) Mother     Arthritis Mother     Coronary Art Dis Mother     High Cholesterol Mother     High Blood Pressure Mother

## 2024-08-09 DIAGNOSIS — E83.42 HYPOMAGNESEMIA: Primary | ICD-10-CM

## 2024-08-18 DIAGNOSIS — E78.5 HYPERLIPIDEMIA, UNSPECIFIED HYPERLIPIDEMIA TYPE: ICD-10-CM

## 2024-08-19 NOTE — TELEPHONE ENCOUNTER
Medication:   Requested Prescriptions     Pending Prescriptions Disp Refills    atorvastatin (LIPITOR) 10 MG tablet [Pharmacy Med Name: Atorvastatin Calcium Oral Tablet 10 MG] 90 tablet 0     Sig: TAKE 1 TABLET BY MOUTH EVERY DAY        Last Filled:  7/24/2023    Patient Phone Number: [phone] (home)     Last appt: 3/2/2023   Next appt: Visit date not found    Last OARRS:        No data to display

## 2024-08-20 RX ORDER — ATORVASTATIN CALCIUM 10 MG/1
TABLET, FILM COATED ORAL
Qty: 90 TABLET | Refills: 0 | OUTPATIENT
Start: 2024-08-20

## 2024-09-02 DIAGNOSIS — E78.5 HYPERLIPIDEMIA, UNSPECIFIED HYPERLIPIDEMIA TYPE: ICD-10-CM

## 2024-09-03 NOTE — TELEPHONE ENCOUNTER
Medication:   Requested Prescriptions     Pending Prescriptions Disp Refills    atorvastatin (LIPITOR) 10 MG tablet [Pharmacy Med Name: Atorvastatin Calcium Oral Tablet 10 MG] 90 tablet 0     Sig: TAKE 1 TABLET BY MOUTH EVERY DAY        Last Filled:  7/24/2023    Patient Phone Number: [phone] (home)     Last appt: 3/2/2023   Next appt: Visit date not found    Last OARRS:        No data to display

## 2024-09-04 RX ORDER — ATORVASTATIN CALCIUM 10 MG/1
TABLET, FILM COATED ORAL
Qty: 90 TABLET | Refills: 0 | Status: SHIPPED | OUTPATIENT
Start: 2024-09-04

## 2024-12-11 DIAGNOSIS — R11.0 NAUSEA: ICD-10-CM

## 2024-12-11 DIAGNOSIS — K52.9 ACUTE GASTROENTERITIS: ICD-10-CM

## 2024-12-11 DIAGNOSIS — E78.5 HYPERLIPIDEMIA, UNSPECIFIED HYPERLIPIDEMIA TYPE: ICD-10-CM

## 2024-12-11 DIAGNOSIS — E55.9 VITAMIN D DEFICIENCY: ICD-10-CM

## 2024-12-11 DIAGNOSIS — K21.9 GASTROESOPHAGEAL REFLUX DISEASE WITHOUT ESOPHAGITIS: ICD-10-CM

## 2024-12-11 RX ORDER — CHOLECALCIFEROL (VITAMIN D3) 1250 MCG
1 CAPSULE ORAL WEEKLY
Qty: 12 CAPSULE | Refills: 1 | Status: SHIPPED | OUTPATIENT
Start: 2024-12-11

## 2024-12-11 NOTE — TELEPHONE ENCOUNTER
Medication:   Requested Prescriptions     Pending Prescriptions Disp Refills    atorvastatin (LIPITOR) 10 MG tablet [Pharmacy Med Name: Atorvastatin Calcium Oral Tablet 10 MG] 90 tablet 0     Sig: TAKE 1 TABLET BY MOUTH EVERY DAY    pantoprazole (PROTONIX) 20 MG tablet [Pharmacy Med Name: Pantoprazole Sodium Oral Tablet Delayed Release 20 MG] 30 tablet 0     Sig: TAKE 1 TABLET BY MOUTH EVERY DAY        Last Filled:  09/04/2024  & 07/19/2024     Patient Phone Number: [phone] (home)     Last appt: 3/2/2023   Next appt: Visit date not found    Last OARRS:        No data to display

## 2024-12-11 NOTE — TELEPHONE ENCOUNTER
Requested Prescriptions     Pending Prescriptions Disp Refills    Cholecalciferol (VITAMIN D3) 1.25 MG (04072 UT) CAPS [Pharmacy Med Name: Vitamin D3 Oral Capsule 1.25 MG (40838 UT)] 12 capsule 0     Sig: TAKE 1 CAPSULE BY MOUTH 1 TIME A WEEK     LOV: 8/8/24  NOV: 8/7/25  Last Refilled: 4.8.24

## 2024-12-12 RX ORDER — PANTOPRAZOLE SODIUM 20 MG/1
TABLET, DELAYED RELEASE ORAL
Qty: 30 TABLET | Refills: 0 | OUTPATIENT
Start: 2024-12-12

## 2024-12-12 RX ORDER — ATORVASTATIN CALCIUM 10 MG/1
TABLET, FILM COATED ORAL
Qty: 90 TABLET | Refills: 0 | OUTPATIENT
Start: 2024-12-12

## 2024-12-15 DIAGNOSIS — E78.5 HYPERLIPIDEMIA, UNSPECIFIED HYPERLIPIDEMIA TYPE: ICD-10-CM

## 2024-12-15 DIAGNOSIS — K21.9 GASTROESOPHAGEAL REFLUX DISEASE WITHOUT ESOPHAGITIS: ICD-10-CM

## 2024-12-15 DIAGNOSIS — K52.9 ACUTE GASTROENTERITIS: ICD-10-CM

## 2024-12-15 DIAGNOSIS — R11.0 NAUSEA: ICD-10-CM

## 2024-12-16 RX ORDER — ATORVASTATIN CALCIUM 10 MG/1
TABLET, FILM COATED ORAL
Qty: 90 TABLET | Refills: 0 | OUTPATIENT
Start: 2024-12-16

## 2024-12-16 RX ORDER — PANTOPRAZOLE SODIUM 20 MG/1
TABLET, DELAYED RELEASE ORAL
Qty: 30 TABLET | Refills: 0 | OUTPATIENT
Start: 2024-12-16

## 2024-12-21 DIAGNOSIS — K52.9 ACUTE GASTROENTERITIS: ICD-10-CM

## 2024-12-21 DIAGNOSIS — R11.0 NAUSEA: ICD-10-CM

## 2024-12-21 DIAGNOSIS — K21.9 GASTROESOPHAGEAL REFLUX DISEASE WITHOUT ESOPHAGITIS: ICD-10-CM

## 2024-12-23 RX ORDER — PANTOPRAZOLE SODIUM 20 MG/1
TABLET, DELAYED RELEASE ORAL
Qty: 30 TABLET | Refills: 0 | OUTPATIENT
Start: 2024-12-23

## 2025-03-11 ENCOUNTER — TELEPHONE (OUTPATIENT)
Dept: ENDOCRINOLOGY | Age: 60
End: 2025-03-11

## 2025-03-11 NOTE — TELEPHONE ENCOUNTER
Please inform MRI showed stable adrenal nodules       MRI Abdomen March 2025: Bilateral adrenal noted. Right adrenal nodule of 1.8 x 1.1 cms (previously 1.9 x 1.2 cms) and left adrenal nodule 1.8 x 1.1 cms (previously 1.5 x 1.1 cms)
